# Patient Record
Sex: MALE | Race: WHITE | Employment: UNEMPLOYED | URBAN - METROPOLITAN AREA
[De-identification: names, ages, dates, MRNs, and addresses within clinical notes are randomized per-mention and may not be internally consistent; named-entity substitution may affect disease eponyms.]

---

## 2017-03-19 ENCOUNTER — HOSPITAL ENCOUNTER (EMERGENCY)
Age: 37
Discharge: HOME | End: 2017-03-19
Attending: EMERGENCY MEDICINE
Payer: MEDICAID

## 2017-03-19 VITALS
SYSTOLIC BLOOD PRESSURE: 141 MMHG | OXYGEN SATURATION: 98 % | HEART RATE: 92 BPM | RESPIRATION RATE: 16 BRPM | TEMPERATURE: 98.2 F | DIASTOLIC BLOOD PRESSURE: 92 MMHG

## 2017-03-19 DIAGNOSIS — I10 ESSENTIAL HYPERTENSION: ICD-10-CM

## 2017-03-19 DIAGNOSIS — M25.562 ACUTE PAIN OF LEFT KNEE: Primary | ICD-10-CM

## 2017-03-19 PROCEDURE — 99283 EMERGENCY DEPT VISIT LOW MDM: CPT

## 2017-03-19 ASSESSMENT — PAIN DESCRIPTION - ORIENTATION: ORIENTATION: RIGHT

## 2017-03-19 ASSESSMENT — PAIN DESCRIPTION - DESCRIPTORS: DESCRIPTORS: SHARP

## 2017-03-19 ASSESSMENT — PAIN SCALES - GENERAL: PAINLEVEL_OUTOF10: 6

## 2017-03-19 ASSESSMENT — PAIN DESCRIPTION - LOCATION: LOCATION: KNEE;LEG

## 2017-03-20 ASSESSMENT — ENCOUNTER SYMPTOMS
SORE THROAT: 0
SINUS PRESSURE: 0
RHINORRHEA: 0
ABDOMINAL PAIN: 0
SHORTNESS OF BREATH: 0
BLOOD IN STOOL: 0
VOMITING: 0
DIARRHEA: 0
NAUSEA: 0
EYE PAIN: 0
PHOTOPHOBIA: 0
COUGH: 0

## 2017-06-12 DIAGNOSIS — M25.561 RIGHT KNEE PAIN, UNSPECIFIED CHRONICITY: Primary | ICD-10-CM

## 2017-06-14 ENCOUNTER — OFFICE VISIT (OUTPATIENT)
Dept: ORTHOPEDIC SURGERY | Age: 37
End: 2017-06-14
Payer: MEDICAID

## 2017-06-14 VITALS — WEIGHT: 155 LBS | HEIGHT: 69 IN | BODY MASS INDEX: 22.96 KG/M2

## 2017-06-14 DIAGNOSIS — M25.562 CHRONIC PAIN OF LEFT KNEE: Primary | ICD-10-CM

## 2017-06-14 DIAGNOSIS — G89.29 CHRONIC PAIN OF LEFT KNEE: Primary | ICD-10-CM

## 2017-06-14 PROCEDURE — 99203 OFFICE O/P NEW LOW 30 MIN: CPT | Performed by: ORTHOPAEDIC SURGERY

## 2017-06-26 ENCOUNTER — HOSPITAL ENCOUNTER (OUTPATIENT)
Dept: MRI IMAGING | Age: 37
Discharge: HOME | End: 2017-06-26
Payer: MEDICAID

## 2017-06-26 DIAGNOSIS — M25.562 CHRONIC PAIN OF LEFT KNEE: ICD-10-CM

## 2017-06-26 DIAGNOSIS — G89.29 CHRONIC PAIN OF LEFT KNEE: ICD-10-CM

## 2017-06-26 PROCEDURE — 73721 MRI JNT OF LWR EXTRE W/O DYE: CPT

## 2017-07-12 ENCOUNTER — OFFICE VISIT (OUTPATIENT)
Dept: ORTHOPEDIC SURGERY | Age: 37
End: 2017-07-12
Payer: MEDICAID

## 2017-07-12 VITALS — WEIGHT: 155 LBS | BODY MASS INDEX: 22.96 KG/M2 | HEIGHT: 69 IN

## 2017-07-12 DIAGNOSIS — S83.512A SPRAIN OF ANTERIOR CRUCIATE LIGAMENT OF LEFT KNEE, INITIAL ENCOUNTER: ICD-10-CM

## 2017-07-12 DIAGNOSIS — S83.282A ACUTE TEAR LATERAL MENISCUS, LEFT, INITIAL ENCOUNTER: Primary | ICD-10-CM

## 2017-07-12 PROBLEM — S83.289A ACUTE TEAR LATERAL MENISCUS: Status: ACTIVE | Noted: 2017-07-12

## 2017-07-12 PROCEDURE — 99213 OFFICE O/P EST LOW 20 MIN: CPT | Performed by: ORTHOPAEDIC SURGERY

## 2017-07-12 ASSESSMENT — ENCOUNTER SYMPTOMS
COLOR CHANGE: 0
VOMITING: 0
ABDOMINAL DISTENTION: 0
EYE PAIN: 0
SHORTNESS OF BREATH: 0
COUGH: 0
ABDOMINAL PAIN: 0
NAUSEA: 0
WHEEZING: 0

## 2017-07-17 ENCOUNTER — TELEPHONE (OUTPATIENT)
Dept: ORTHOPEDIC SURGERY | Age: 37
End: 2017-07-17

## 2017-07-17 ENCOUNTER — HOSPITAL ENCOUNTER (OUTPATIENT)
Dept: PHYSICAL THERAPY | Facility: CLINIC | Age: 37
Setting detail: THERAPIES SERIES
Discharge: HOME | End: 2017-07-17
Payer: MEDICAID

## 2017-07-17 PROCEDURE — 97162 PT EVAL MOD COMPLEX 30 MIN: CPT

## 2017-07-17 PROCEDURE — 97016 VASOPNEUMATIC DEVICE THERAPY: CPT

## 2017-07-18 ENCOUNTER — APPOINTMENT (OUTPATIENT)
Dept: PHYSICAL THERAPY | Facility: CLINIC | Age: 37
End: 2017-07-18
Payer: MEDICAID

## 2017-07-21 ENCOUNTER — HOSPITAL ENCOUNTER (OUTPATIENT)
Dept: PHYSICAL THERAPY | Facility: CLINIC | Age: 37
Setting detail: THERAPIES SERIES
Discharge: HOME | End: 2017-07-21
Payer: MEDICAID

## 2017-07-21 PROCEDURE — 97016 VASOPNEUMATIC DEVICE THERAPY: CPT

## 2017-07-21 PROCEDURE — 97110 THERAPEUTIC EXERCISES: CPT

## 2017-07-25 ENCOUNTER — HOSPITAL ENCOUNTER (OUTPATIENT)
Dept: PHYSICAL THERAPY | Facility: CLINIC | Age: 37
Setting detail: THERAPIES SERIES
Discharge: HOME | End: 2017-07-25
Payer: MEDICAID

## 2017-07-25 NOTE — FLOWSHEET NOTE
[] Claretha Dubin        Outpatient Physical                Therapy       [address]       Phone: [phone]       Fax: [phone] [x] [facility] at [address] [facility]       Phone: [phone]       Fax: [phone] [] [place]. [address] [place] [address]     [facility]      Phone: [phone]      Fax:  [phone]     Physical Therapy Cancel/No Show note    Date: 2017  Patient: Cole Lugo  : 1980  MRN: [id number]    Cancels/No Shows to date:     For today's appointment patient:  [x]  Cancelled  []  Rescheduled appointment  []  No-show     Reason given by patient:  []  Patient ill  []  Conflicting appointment  []  No transportation    [x]  Conflict with work  []  No reason given  []  Weather related  []  Other:     Comments:  Pt called to cancel d/t having to work.  Pt has next appointment 17 at 11:45am.     Electronically signed by: Rafael Newton PTA

## 2017-07-28 ENCOUNTER — HOSPITAL ENCOUNTER (OUTPATIENT)
Dept: PHYSICAL THERAPY | Facility: CLINIC | Age: 37
Setting detail: THERAPIES SERIES
Discharge: HOME | End: 2017-07-28
Payer: MEDICAID

## 2017-08-01 ENCOUNTER — HOSPITAL ENCOUNTER (OUTPATIENT)
Dept: PHYSICAL THERAPY | Facility: CLINIC | Age: 37
Setting detail: THERAPIES SERIES
Discharge: HOME | End: 2017-08-01
Payer: MEDICAID

## 2017-08-04 ENCOUNTER — HOSPITAL ENCOUNTER (OUTPATIENT)
Dept: PHYSICAL THERAPY | Facility: CLINIC | Age: 37
Setting detail: THERAPIES SERIES
Discharge: HOME | End: 2017-08-04
Payer: MEDICAID

## 2017-08-18 ENCOUNTER — HOSPITAL ENCOUNTER (OUTPATIENT)
Dept: PHYSICAL THERAPY | Facility: CLINIC | Age: 37
Setting detail: THERAPIES SERIES
Discharge: HOME | End: 2017-08-18
Payer: MEDICAID

## 2017-08-29 ENCOUNTER — HOSPITAL ENCOUNTER (OUTPATIENT)
Dept: PHYSICAL THERAPY | Facility: CLINIC | Age: 37
Setting detail: THERAPIES SERIES
Discharge: HOME | End: 2017-08-29
Payer: MEDICAID

## 2017-12-10 ENCOUNTER — HOSPITAL ENCOUNTER (EMERGENCY)
Age: 37
Discharge: HOME | End: 2017-12-10
Attending: EMERGENCY MEDICINE

## 2017-12-10 VITALS
SYSTOLIC BLOOD PRESSURE: 145 MMHG | RESPIRATION RATE: 18 BRPM | OXYGEN SATURATION: 98 % | HEART RATE: 88 BPM | DIASTOLIC BLOOD PRESSURE: 77 MMHG | TEMPERATURE: 97.2 F

## 2017-12-10 DIAGNOSIS — S76.319A PARTIAL HAMSTRING TEAR, INITIAL ENCOUNTER: Primary | ICD-10-CM

## 2017-12-10 DIAGNOSIS — M25.562 CHRONIC PAIN OF LEFT KNEE: ICD-10-CM

## 2017-12-10 DIAGNOSIS — G89.29 CHRONIC PAIN OF LEFT KNEE: ICD-10-CM

## 2017-12-10 PROCEDURE — 6370000000 HC RX 637 (ALT 250 FOR IP): Performed by: STUDENT IN AN ORGANIZED HEALTH CARE EDUCATION/TRAINING PROGRAM

## 2017-12-10 PROCEDURE — G0382 LEV 3 HOSP TYPE B ED VISIT: HCPCS

## 2017-12-10 RX ORDER — IBUPROFEN 800 MG/1
800 TABLET ORAL ONCE
Status: COMPLETED | OUTPATIENT
Start: 2017-12-10 | End: 2017-12-10

## 2017-12-10 RX ADMIN — IBUPROFEN 800 MG: 800 TABLET, FILM COATED ORAL at 21:29

## 2017-12-10 ASSESSMENT — PAIN SCALES - GENERAL
PAINLEVEL_OUTOF10: 7
PAINLEVEL_OUTOF10: 7

## 2017-12-10 ASSESSMENT — PAIN DESCRIPTION - PAIN TYPE: TYPE: ACUTE PAIN

## 2017-12-10 ASSESSMENT — ENCOUNTER SYMPTOMS
NAUSEA: 0
SHORTNESS OF BREATH: 0
VOMITING: 0
ABDOMINAL PAIN: 0

## 2017-12-10 ASSESSMENT — PAIN DESCRIPTION - LOCATION: LOCATION: KNEE
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
## 2017-12-11 NOTE — ED NOTES
Pt arrives to the ED by self  Pt was performing a stropping routine whne he landed in the splits  Pt having left knee pain  Pt rates pain a 7/10 at this time     Lexi Delgado RN  12/10/17 2567

## 2018-07-05 ENCOUNTER — HOSPITAL ENCOUNTER (EMERGENCY)
Age: 38
Discharge: HOME | End: 2018-07-05
Attending: EMERGENCY MEDICINE
Payer: OTHER MISCELLANEOUS

## 2018-07-05 ENCOUNTER — APPOINTMENT (OUTPATIENT)
Dept: GENERAL RADIOLOGY | Age: 38
End: 2018-07-05
Payer: OTHER MISCELLANEOUS

## 2018-07-05 VITALS
OXYGEN SATURATION: 97 % | BODY MASS INDEX: 21.48 KG/M2 | HEIGHT: 69 IN | DIASTOLIC BLOOD PRESSURE: 89 MMHG | RESPIRATION RATE: 16 BRPM | WEIGHT: 145 LBS | HEART RATE: 92 BPM | TEMPERATURE: 97.3 F | SYSTOLIC BLOOD PRESSURE: 136 MMHG

## 2018-07-05 DIAGNOSIS — S16.1XXA STRAIN OF NECK MUSCLE, INITIAL ENCOUNTER: Primary | ICD-10-CM

## 2018-07-05 PROCEDURE — 99283 EMERGENCY DEPT VISIT LOW MDM: CPT

## 2018-07-05 PROCEDURE — 6360000002 HC RX W HCPCS: Performed by: EMERGENCY MEDICINE

## 2018-07-05 PROCEDURE — 72040 X-RAY EXAM NECK SPINE 2-3 VW: CPT

## 2018-07-05 PROCEDURE — 96372 THER/PROPH/DIAG INJ SC/IM: CPT

## 2018-07-05 RX ORDER — IBUPROFEN 400 MG/1
400 TABLET ORAL EVERY 6 HOURS PRN
Qty: 60 TABLET | Refills: 0 | Status: SHIPPED | OUTPATIENT
Start: 2018-07-05 | End: 2020-10-21

## 2018-07-05 RX ORDER — CYCLOBENZAPRINE HCL 10 MG
10 TABLET ORAL 3 TIMES DAILY PRN
Qty: 30 TABLET | Refills: 0 | Status: SHIPPED | OUTPATIENT
Start: 2018-07-05 | End: 2018-07-15

## 2018-07-05 RX ORDER — ORPHENADRINE CITRATE 30 MG/ML
60 INJECTION INTRAMUSCULAR; INTRAVENOUS ONCE
Status: COMPLETED | OUTPATIENT
Start: 2018-07-05 | End: 2018-07-05

## 2018-07-05 RX ADMIN — ORPHENADRINE CITRATE 60 MG: 30 INJECTION INTRAMUSCULAR; INTRAVENOUS at 07:32

## 2018-07-05 ASSESSMENT — ENCOUNTER SYMPTOMS
NAUSEA: 0
SHORTNESS OF BREATH: 0
ABDOMINAL PAIN: 0
VOMITING: 0

## 2018-07-05 ASSESSMENT — PAIN SCALES - GENERAL: PAINLEVEL_OUTOF10: 7

## 2018-07-05 NOTE — ED PROVIDER NOTES
cyclobenzaprine (FLEXERIL) 10 MG tablet Take 1 tablet by mouth 3 times daily as needed for Muscle spasms, Disp-30 tablet, R-0Print      ibuprofen (IBU) 400 MG tablet Take 1 tablet by mouth every 6 hours as needed for Pain, Disp-60 tablet, R-0Print             Prabha Trent MD  Emergency Medicine Resident    (Please note that portions of this note were completed with a voice recognition program.  Efforts were made to edit the dictations but occasionally words are mis-transcribed.)       Prabha Trent MD  Resident  07/05/18 7328

## 2018-07-05 NOTE — ED NOTES
Received into room 2 via triage with c/o neck pain and other places.   PAin at and 3 at rest and 7 with movement     Ivan Coates RN  07/05/18 5274
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
## 2018-10-18 ENCOUNTER — HOSPITAL ENCOUNTER (EMERGENCY)
Age: 38
Discharge: HOME | End: 2018-10-18
Attending: EMERGENCY MEDICINE

## 2018-10-18 VITALS
HEART RATE: 88 BPM | WEIGHT: 148 LBS | OXYGEN SATURATION: 98 % | SYSTOLIC BLOOD PRESSURE: 140 MMHG | HEIGHT: 69 IN | TEMPERATURE: 97.5 F | RESPIRATION RATE: 16 BRPM | BODY MASS INDEX: 21.92 KG/M2 | DIASTOLIC BLOOD PRESSURE: 94 MMHG

## 2018-10-18 DIAGNOSIS — K08.89 PAIN, DENTAL: Primary | ICD-10-CM

## 2018-10-18 PROCEDURE — 99282 EMERGENCY DEPT VISIT SF MDM: CPT

## 2018-10-18 PROCEDURE — 6360000002 HC RX W HCPCS: Performed by: STUDENT IN AN ORGANIZED HEALTH CARE EDUCATION/TRAINING PROGRAM

## 2018-10-18 PROCEDURE — 96374 THER/PROPH/DIAG INJ IV PUSH: CPT

## 2018-10-18 PROCEDURE — 6370000000 HC RX 637 (ALT 250 FOR IP): Performed by: STUDENT IN AN ORGANIZED HEALTH CARE EDUCATION/TRAINING PROGRAM

## 2018-10-18 RX ORDER — CLINDAMYCIN HYDROCHLORIDE 300 MG/1
300 CAPSULE ORAL 4 TIMES DAILY
Status: ON HOLD | COMMUNITY
End: 2019-09-05 | Stop reason: ALTCHOICE

## 2018-10-18 RX ORDER — HYDROCODONE BITARTRATE AND ACETAMINOPHEN 5; 325 MG/1; MG/1
2 TABLET ORAL ONCE
Status: COMPLETED | OUTPATIENT
Start: 2018-10-18 | End: 2018-10-18

## 2018-10-18 RX ORDER — HYDROCODONE BITARTRATE AND ACETAMINOPHEN 5; 325 MG/1; MG/1
1 TABLET ORAL EVERY 8 HOURS PRN
Qty: 15 TABLET | Refills: 0 | Status: SHIPPED | OUTPATIENT
Start: 2018-10-18 | End: 2018-10-23

## 2018-10-18 RX ORDER — KETOROLAC TROMETHAMINE 30 MG/ML
30 INJECTION, SOLUTION INTRAMUSCULAR; INTRAVENOUS ONCE
Status: COMPLETED | OUTPATIENT
Start: 2018-10-18 | End: 2018-10-18

## 2018-10-18 RX ADMIN — KETOROLAC TROMETHAMINE 30 MG: 30 INJECTION, SOLUTION INTRAMUSCULAR at 18:15

## 2018-10-18 RX ADMIN — HYDROCODONE BITARTRATE AND ACETAMINOPHEN 2 TABLET: 5; 325 TABLET ORAL at 18:14

## 2018-10-18 ASSESSMENT — PAIN DESCRIPTION - PAIN TYPE: TYPE: ACUTE PAIN

## 2018-10-18 ASSESSMENT — PAIN DESCRIPTION - ORIENTATION: ORIENTATION: RIGHT;UPPER

## 2018-10-18 ASSESSMENT — ENCOUNTER SYMPTOMS
COUGH: 0
TROUBLE SWALLOWING: 0
SHORTNESS OF BREATH: 0
EYE PAIN: 0
SORE THROAT: 0
VOMITING: 0
EYE DISCHARGE: 0
DIARRHEA: 1
ABDOMINAL PAIN: 1
EYE ITCHING: 0
NAUSEA: 0
RHINORRHEA: 0
BACK PAIN: 0

## 2018-10-18 ASSESSMENT — PAIN SCALES - GENERAL
PAINLEVEL_OUTOF10: 4
PAINLEVEL_OUTOF10: 5

## 2018-10-18 ASSESSMENT — PAIN DESCRIPTION - FREQUENCY: FREQUENCY: CONTINUOUS

## 2018-10-18 ASSESSMENT — PAIN DESCRIPTION - LOCATION: LOCATION: TEETH

## 2018-10-18 ASSESSMENT — PAIN DESCRIPTION - DESCRIPTORS: DESCRIPTORS: ACHING;CONSTANT

## 2018-10-18 ASSESSMENT — PAIN DESCRIPTION - ONSET: ONSET: ON-GOING

## 2019-09-05 ENCOUNTER — HOSPITAL ENCOUNTER (INPATIENT)
Age: 39
LOS: 5 days | Discharge: HOME | DRG: 139 | End: 2019-09-10
Attending: EMERGENCY MEDICINE | Admitting: INTERNAL MEDICINE
Payer: MEDICAID

## 2019-09-05 ENCOUNTER — APPOINTMENT (OUTPATIENT)
Dept: GENERAL RADIOLOGY | Age: 39
DRG: 139 | End: 2019-09-05
Payer: MEDICAID

## 2019-09-05 ENCOUNTER — APPOINTMENT (OUTPATIENT)
Dept: CT IMAGING | Age: 39
DRG: 139 | End: 2019-09-05
Payer: MEDICAID

## 2019-09-05 DIAGNOSIS — N17.9 ACUTE KIDNEY INJURY (HCC): Primary | ICD-10-CM

## 2019-09-05 DIAGNOSIS — H91.93 HEARING DIFFICULTY OF BOTH EARS: ICD-10-CM

## 2019-09-05 DIAGNOSIS — E87.20 LACTIC ACIDOSIS: ICD-10-CM

## 2019-09-05 DIAGNOSIS — J18.9 PNEUMONIA DUE TO ORGANISM: ICD-10-CM

## 2019-09-05 LAB
-: ABNORMAL
ABSOLUTE EOS #: 0 K/UL (ref 0–0.4)
ABSOLUTE IMMATURE GRANULOCYTE: 0.13 K/UL (ref 0–0.3)
ABSOLUTE LYMPH #: 1.79 K/UL (ref 1–4.8)
ABSOLUTE MONO #: 0.64 K/UL (ref 0.1–0.8)
ACETAMINOPHEN LEVEL: <5 UG/ML (ref 10–30)
ALBUMIN SERPL-MCNC: 3.9 G/DL (ref 3.5–5.2)
ALBUMIN/GLOBULIN RATIO: 1.1 (ref 1–2.5)
ALP BLD-CCNC: 49 U/L (ref 40–129)
ALT SERPL-CCNC: 34 U/L (ref 5–41)
AMORPHOUS: ABNORMAL
AMPHETAMINE SCREEN URINE: POSITIVE
ANION GAP SERPL CALCULATED.3IONS-SCNC: 21 MMOL/L (ref 9–17)
AST SERPL-CCNC: 54 U/L
BACTERIA: ABNORMAL
BARBITURATE SCREEN URINE: NEGATIVE
BASOPHILS # BLD: 0 % (ref 0–2)
BASOPHILS ABSOLUTE: 0 K/UL (ref 0–0.2)
BENZODIAZEPINE SCREEN, URINE: NEGATIVE
BILIRUB SERPL-MCNC: 0.22 MG/DL (ref 0.3–1.2)
BILIRUBIN DIRECT: <0.08 MG/DL
BILIRUBIN URINE: NEGATIVE
BILIRUBIN, INDIRECT: ABNORMAL MG/DL (ref 0–1)
BNP INTERPRETATION: ABNORMAL
BUN BLDV-MCNC: 32 MG/DL (ref 6–20)
BUN/CREAT BLD: ABNORMAL (ref 9–20)
BUPRENORPHINE URINE: ABNORMAL
C-REACTIVE PROTEIN: 166.1 MG/L (ref 0–5)
CALCIUM SERPL-MCNC: 8.1 MG/DL (ref 8.6–10.4)
CANNABINOID SCREEN URINE: NEGATIVE
CASTS UA: ABNORMAL /LPF (ref 0–2)
CHLORIDE BLD-SCNC: 91 MMOL/L (ref 98–107)
CO2: 17 MMOL/L (ref 20–31)
COCAINE METABOLITE, URINE: NEGATIVE
COLOR: YELLOW
CORTISOL COLLECTION INFO: ABNORMAL
CORTISOL: 34.5 UG/DL (ref 2.7–18.4)
CREAT SERPL-MCNC: 4.07 MG/DL (ref 0.7–1.2)
CRYSTALS, UA: ABNORMAL /HPF
CRYSTALS, UA: ABNORMAL /HPF
DIFFERENTIAL TYPE: ABNORMAL
DIRECT EXAM: NORMAL
EOSINOPHILS RELATIVE PERCENT: 0 % (ref 1–4)
EPITHELIAL CELLS UA: ABNORMAL /HPF (ref 0–5)
ETHANOL PERCENT: <0.01 %
ETHANOL: <10 MG/DL
GFR AFRICAN AMERICAN: 20 ML/MIN
GFR NON-AFRICAN AMERICAN: 17 ML/MIN
GFR SERPL CREATININE-BSD FRML MDRD: ABNORMAL ML/MIN/{1.73_M2}
GFR SERPL CREATININE-BSD FRML MDRD: ABNORMAL ML/MIN/{1.73_M2}
GLOBULIN: ABNORMAL G/DL (ref 1.5–3.8)
GLUCOSE BLD-MCNC: 95 MG/DL (ref 70–99)
GLUCOSE URINE: NEGATIVE
HAV IGM SER IA-ACNC: NONREACTIVE
HCT VFR BLD CALC: 43.1 % (ref 40.7–50.3)
HEMOGLOBIN: 13.4 G/DL (ref 13–17)
HEPATITIS B CORE IGM ANTIBODY: NONREACTIVE
HEPATITIS B SURFACE ANTIGEN: NONREACTIVE
HEPATITIS C ANTIBODY: NONREACTIVE
HIV AG/AB: NONREACTIVE
IMMATURE GRANULOCYTES: 1 %
INR BLD: 0.9
KETONES, URINE: NEGATIVE
LACTIC ACID, WHOLE BLOOD: 3.8 MMOL/L (ref 0.7–2.1)
LACTIC ACID, WHOLE BLOOD: 6.7 MMOL/L (ref 0.7–2.1)
LEUKOCYTE ESTERASE, URINE: NEGATIVE
LYMPHOCYTES # BLD: 14 % (ref 24–44)
Lab: NORMAL
MCH RBC QN AUTO: 30.4 PG (ref 25.2–33.5)
MCHC RBC AUTO-ENTMCNC: 31.1 G/DL (ref 28.4–34.8)
MCV RBC AUTO: 97.7 FL (ref 82.6–102.9)
MDMA URINE: ABNORMAL
METHADONE SCREEN, URINE: NEGATIVE
METHAMPHETAMINE, URINE: ABNORMAL
MONOCYTES # BLD: 5 % (ref 1–7)
MORPHOLOGY: ABNORMAL
MUCUS: ABNORMAL
NITRITE, URINE: NEGATIVE
NRBC AUTOMATED: 0 PER 100 WBC
OPIATES, URINE: NEGATIVE
OTHER OBSERVATIONS UA: ABNORMAL
OXYCODONE SCREEN URINE: NEGATIVE
PARTIAL THROMBOPLASTIN TIME: 25.6 SEC (ref 20.5–30.5)
PDW BLD-RTO: 14.4 % (ref 11.8–14.4)
PH UA: 5 (ref 5–8)
PHENCYCLIDINE, URINE: NEGATIVE
PLATELET # BLD: 280 K/UL (ref 138–453)
PLATELET ESTIMATE: ABNORMAL
PMV BLD AUTO: 9.8 FL (ref 8.1–13.5)
POTASSIUM SERPL-SCNC: 5 MMOL/L (ref 3.7–5.3)
PRO-BNP: 3554 PG/ML
PROCALCITONIN: 89.86 NG/ML
PROPOXYPHENE, URINE: ABNORMAL
PROTEIN UA: ABNORMAL
PROTHROMBIN TIME: 9.8 SEC (ref 9–12)
RBC # BLD: 4.41 M/UL (ref 4.21–5.77)
RBC # BLD: ABNORMAL 10*6/UL
RBC UA: ABNORMAL /HPF (ref 0–2)
RENAL EPITHELIAL, UA: ABNORMAL /HPF
SALICYLATE LEVEL: <1 MG/DL (ref 3–10)
SEDIMENTATION RATE, ERYTHROCYTE: 5 MM (ref 0–10)
SEG NEUTROPHILS: 80 % (ref 36–66)
SEGMENTED NEUTROPHILS ABSOLUTE COUNT: 10.24 K/UL (ref 1.8–7.7)
SODIUM BLD-SCNC: 129 MMOL/L (ref 135–144)
SPECIFIC GRAVITY UA: 1.02 (ref 1–1.03)
SPECIMEN DESCRIPTION: NORMAL
TEST INFORMATION: ABNORMAL
TOTAL CK: 1023 U/L (ref 39–308)
TOTAL PROTEIN: 7.3 G/DL (ref 6.4–8.3)
TOXIC TRICYCLIC SC,BLOOD: NEGATIVE
TRICHOMONAS: ABNORMAL
TRICYCLIC ANTIDEPRESSANTS, UR: ABNORMAL
TROPONIN INTERP: ABNORMAL
TROPONIN INTERP: ABNORMAL
TROPONIN T: ABNORMAL NG/ML
TROPONIN T: ABNORMAL NG/ML
TROPONIN, HIGH SENSITIVITY: 26 NG/L (ref 0–22)
TROPONIN, HIGH SENSITIVITY: 52 NG/L (ref 0–22)
TURBIDITY: ABNORMAL
URINE HGB: ABNORMAL
UROBILINOGEN, URINE: NORMAL
WBC # BLD: 12.8 K/UL (ref 3.5–11.3)
WBC # BLD: ABNORMAL 10*3/UL
WBC UA: ABNORMAL /HPF (ref 0–5)
YEAST: ABNORMAL

## 2019-09-05 PROCEDURE — 2000000000 HC ICU R&B

## 2019-09-05 PROCEDURE — 80307 DRUG TEST PRSMV CHEM ANLYZR: CPT

## 2019-09-05 PROCEDURE — 2580000003 HC RX 258: Performed by: STUDENT IN AN ORGANIZED HEALTH CARE EDUCATION/TRAINING PROGRAM

## 2019-09-05 PROCEDURE — 85025 COMPLETE CBC W/AUTO DIFF WBC: CPT

## 2019-09-05 PROCEDURE — 85610 PROTHROMBIN TIME: CPT

## 2019-09-05 PROCEDURE — 85730 THROMBOPLASTIN TIME PARTIAL: CPT

## 2019-09-05 PROCEDURE — 80048 BASIC METABOLIC PNL TOTAL CA: CPT

## 2019-09-05 PROCEDURE — 87449 NOS EACH ORGANISM AG IA: CPT

## 2019-09-05 PROCEDURE — 87086 URINE CULTURE/COLONY COUNT: CPT

## 2019-09-05 PROCEDURE — 82533 TOTAL CORTISOL: CPT

## 2019-09-05 PROCEDURE — 93005 ELECTROCARDIOGRAM TRACING: CPT | Performed by: STUDENT IN AN ORGANIZED HEALTH CARE EDUCATION/TRAINING PROGRAM

## 2019-09-05 PROCEDURE — 96367 TX/PROPH/DG ADDL SEQ IV INF: CPT

## 2019-09-05 PROCEDURE — 71046 X-RAY EXAM CHEST 2 VIEWS: CPT

## 2019-09-05 PROCEDURE — 71250 CT THORAX DX C-: CPT

## 2019-09-05 PROCEDURE — 96375 TX/PRO/DX INJ NEW DRUG ADDON: CPT

## 2019-09-05 PROCEDURE — 87389 HIV-1 AG W/HIV-1&-2 AB AG IA: CPT

## 2019-09-05 PROCEDURE — 84484 ASSAY OF TROPONIN QUANT: CPT

## 2019-09-05 PROCEDURE — 87641 MR-STAPH DNA AMP PROBE: CPT

## 2019-09-05 PROCEDURE — 81001 URINALYSIS AUTO W/SCOPE: CPT

## 2019-09-05 PROCEDURE — 83605 ASSAY OF LACTIC ACID: CPT

## 2019-09-05 PROCEDURE — 99285 EMERGENCY DEPT VISIT HI MDM: CPT

## 2019-09-05 PROCEDURE — 2500000003 HC RX 250 WO HCPCS: Performed by: STUDENT IN AN ORGANIZED HEALTH CARE EDUCATION/TRAINING PROGRAM

## 2019-09-05 PROCEDURE — 86738 MYCOPLASMA ANTIBODY: CPT

## 2019-09-05 PROCEDURE — G0480 DRUG TEST DEF 1-7 CLASSES: HCPCS

## 2019-09-05 PROCEDURE — 96365 THER/PROPH/DIAG IV INF INIT: CPT

## 2019-09-05 PROCEDURE — 87040 BLOOD CULTURE FOR BACTERIA: CPT

## 2019-09-05 PROCEDURE — 82550 ASSAY OF CK (CPK): CPT

## 2019-09-05 PROCEDURE — 83880 ASSAY OF NATRIURETIC PEPTIDE: CPT

## 2019-09-05 PROCEDURE — 80074 ACUTE HEPATITIS PANEL: CPT

## 2019-09-05 PROCEDURE — 80076 HEPATIC FUNCTION PANEL: CPT

## 2019-09-05 PROCEDURE — 6360000002 HC RX W HCPCS: Performed by: STUDENT IN AN ORGANIZED HEALTH CARE EDUCATION/TRAINING PROGRAM

## 2019-09-05 PROCEDURE — 6370000000 HC RX 637 (ALT 250 FOR IP): Performed by: STUDENT IN AN ORGANIZED HEALTH CARE EDUCATION/TRAINING PROGRAM

## 2019-09-05 PROCEDURE — 86140 C-REACTIVE PROTEIN: CPT

## 2019-09-05 PROCEDURE — 85651 RBC SED RATE NONAUTOMATED: CPT

## 2019-09-05 PROCEDURE — 70450 CT HEAD/BRAIN W/O DYE: CPT

## 2019-09-05 PROCEDURE — 84145 PROCALCITONIN (PCT): CPT

## 2019-09-05 RX ORDER — ACETAMINOPHEN 325 MG/1
650 TABLET ORAL EVERY 4 HOURS PRN
Status: DISCONTINUED | OUTPATIENT
Start: 2019-09-05 | End: 2019-09-06 | Stop reason: SDUPTHER

## 2019-09-05 RX ORDER — SODIUM CHLORIDE 0.9 % (FLUSH) 0.9 %
10 SYRINGE (ML) INJECTION EVERY 12 HOURS SCHEDULED
Status: DISCONTINUED | OUTPATIENT
Start: 2019-09-05 | End: 2019-09-10 | Stop reason: HOSPADM

## 2019-09-05 RX ORDER — OXYCODONE HYDROCHLORIDE 5 MG/1
5 TABLET ORAL ONCE
Status: COMPLETED | OUTPATIENT
Start: 2019-09-05 | End: 2019-09-05

## 2019-09-05 RX ORDER — 0.9 % SODIUM CHLORIDE 0.9 %
1000 INTRAVENOUS SOLUTION INTRAVENOUS ONCE
Status: COMPLETED | OUTPATIENT
Start: 2019-09-05 | End: 2019-09-05

## 2019-09-05 RX ORDER — SODIUM CHLORIDE 0.9 % (FLUSH) 0.9 %
10 SYRINGE (ML) INJECTION PRN
Status: DISCONTINUED | OUTPATIENT
Start: 2019-09-05 | End: 2019-09-10 | Stop reason: HOSPADM

## 2019-09-05 RX ORDER — 0.9 % SODIUM CHLORIDE 0.9 %
109 INTRAVENOUS SOLUTION INTRAVENOUS ONCE
Status: COMPLETED | OUTPATIENT
Start: 2019-09-05 | End: 2019-09-05

## 2019-09-05 RX ORDER — SODIUM CHLORIDE 9 MG/ML
INJECTION, SOLUTION INTRAVENOUS CONTINUOUS
Status: DISCONTINUED | OUTPATIENT
Start: 2019-09-05 | End: 2019-09-08

## 2019-09-05 RX ORDER — FENTANYL CITRATE 50 UG/ML
50 INJECTION, SOLUTION INTRAMUSCULAR; INTRAVENOUS ONCE
Status: COMPLETED | OUTPATIENT
Start: 2019-09-05 | End: 2019-09-05

## 2019-09-05 RX ORDER — LORAZEPAM 2 MG/ML
1 INJECTION INTRAMUSCULAR ONCE
Status: COMPLETED | OUTPATIENT
Start: 2019-09-05 | End: 2019-09-05

## 2019-09-05 RX ADMIN — Medication: at 17:25

## 2019-09-05 RX ADMIN — Medication 10 ML: at 21:11

## 2019-09-05 RX ADMIN — SODIUM CHLORIDE 109 ML: 9 INJECTION, SOLUTION INTRAVENOUS at 17:34

## 2019-09-05 RX ADMIN — SODIUM CHLORIDE 1000 ML: 9 INJECTION, SOLUTION INTRAVENOUS at 15:22

## 2019-09-05 RX ADMIN — VANCOMYCIN HYDROCHLORIDE 1250 MG: 10 INJECTION, POWDER, LYOPHILIZED, FOR SOLUTION INTRAVENOUS at 22:25

## 2019-09-05 RX ADMIN — SODIUM CHLORIDE: 9 INJECTION, SOLUTION INTRAVENOUS at 21:21

## 2019-09-05 RX ADMIN — OXYCODONE HYDROCHLORIDE 5 MG: 5 TABLET ORAL at 21:19

## 2019-09-05 RX ADMIN — SODIUM CHLORIDE 1000 ML: 9 INJECTION, SOLUTION INTRAVENOUS at 13:02

## 2019-09-05 RX ADMIN — CEFTRIAXONE SODIUM 1 G: 1 INJECTION, POWDER, FOR SOLUTION INTRAMUSCULAR; INTRAVENOUS at 15:22

## 2019-09-05 RX ADMIN — LORAZEPAM 1 MG: 2 INJECTION INTRAMUSCULAR; INTRAVENOUS at 13:34

## 2019-09-05 RX ADMIN — PIPERACILLIN AND TAZOBACTAM 3.38 G: 3; .375 INJECTION, POWDER, FOR SOLUTION INTRAVENOUS at 22:26

## 2019-09-05 RX ADMIN — FENTANYL CITRATE 50 MCG: 50 INJECTION INTRAMUSCULAR; INTRAVENOUS at 21:19

## 2019-09-05 RX ADMIN — AZITHROMYCIN MONOHYDRATE 500 MG: 500 INJECTION, POWDER, LYOPHILIZED, FOR SOLUTION INTRAVENOUS at 15:54

## 2019-09-05 ASSESSMENT — PAIN DESCRIPTION - LOCATION
LOCATION: CHEST;HEAD
LOCATION: CHEST

## 2019-09-05 ASSESSMENT — ENCOUNTER SYMPTOMS
SHORTNESS OF BREATH: 0
SORE THROAT: 1
ABDOMINAL PAIN: 0
BACK PAIN: 0
NAUSEA: 0
VOMITING: 0
COUGH: 1
CONSTIPATION: 0
DIARRHEA: 0

## 2019-09-05 ASSESSMENT — PAIN SCALES - GENERAL
PAINLEVEL_OUTOF10: 7
PAINLEVEL_OUTOF10: 6

## 2019-09-05 ASSESSMENT — PAIN DESCRIPTION - ORIENTATION: ORIENTATION: RIGHT

## 2019-09-05 ASSESSMENT — PAIN DESCRIPTION - FREQUENCY
FREQUENCY: CONTINUOUS
FREQUENCY: CONTINUOUS

## 2019-09-05 ASSESSMENT — PAIN DESCRIPTION - PROGRESSION
CLINICAL_PROGRESSION: NOT CHANGED
CLINICAL_PROGRESSION: NOT CHANGED

## 2019-09-05 ASSESSMENT — PAIN DESCRIPTION - PAIN TYPE: TYPE: ACUTE PAIN

## 2019-09-05 ASSESSMENT — PAIN DESCRIPTION - ONSET
ONSET: ON-GOING
ONSET: ON-GOING

## 2019-09-06 ENCOUNTER — APPOINTMENT (OUTPATIENT)
Dept: INTERVENTIONAL RADIOLOGY/VASCULAR | Age: 39
DRG: 139 | End: 2019-09-06
Payer: MEDICAID

## 2019-09-06 ENCOUNTER — APPOINTMENT (OUTPATIENT)
Dept: GENERAL RADIOLOGY | Age: 39
DRG: 139 | End: 2019-09-06
Payer: MEDICAID

## 2019-09-06 ENCOUNTER — APPOINTMENT (OUTPATIENT)
Dept: ULTRASOUND IMAGING | Age: 39
DRG: 139 | End: 2019-09-06
Payer: MEDICAID

## 2019-09-06 PROBLEM — G93.41 METABOLIC ENCEPHALOPATHY: Status: ACTIVE | Noted: 2019-09-06

## 2019-09-06 LAB
ABSOLUTE EOS #: 0 K/UL (ref 0–0.4)
ABSOLUTE IMMATURE GRANULOCYTE: 1.05 K/UL (ref 0–0.3)
ABSOLUTE LYMPH #: 1.31 K/UL (ref 1–4.8)
ABSOLUTE MONO #: 0.26 K/UL (ref 0.1–0.8)
ALBUMIN SERPL-MCNC: 3.4 G/DL (ref 3.5–5.2)
ALBUMIN/GLOBULIN RATIO: 1.1 (ref 1–2.5)
ALP BLD-CCNC: 46 U/L (ref 40–129)
ALT SERPL-CCNC: 31 U/L (ref 5–41)
ANION GAP SERPL CALCULATED.3IONS-SCNC: 14 MMOL/L (ref 9–17)
APPEARANCE CSF: CLEAR
AST SERPL-CCNC: 42 U/L
BASOPHILS # BLD: 0 % (ref 0–2)
BASOPHILS ABSOLUTE: 0 K/UL (ref 0–0.2)
BILIRUB SERPL-MCNC: 0.29 MG/DL (ref 0.3–1.2)
BUN BLDV-MCNC: 25 MG/DL (ref 6–20)
BUN/CREAT BLD: ABNORMAL (ref 9–20)
CALCIUM IONIZED: 0.99 MMOL/L (ref 1.13–1.33)
CALCIUM SERPL-MCNC: 8 MG/DL (ref 8.6–10.4)
CHLORIDE BLD-SCNC: 94 MMOL/L (ref 98–107)
CO2: 26 MMOL/L (ref 20–31)
COMPLEMENT C3: 103 MG/DL (ref 90–180)
COMPLEMENT C4: 23 MG/DL (ref 10–40)
CREAT SERPL-MCNC: 1.46 MG/DL (ref 0.7–1.2)
CREATININE URINE: 53 MG/DL (ref 39–259)
CRYPTOCOCCUS NEOFORMANS/GATTI CSF FILM ARR.: NOT DETECTED
CULTURE: NORMAL
CYTOMEGALOVIRUS (CMV) CSF FILM ARRAY: NOT DETECTED
DIFFERENTIAL TYPE: ABNORMAL
DIRECT EXAM: NORMAL
EKG ATRIAL RATE: 83 BPM
EKG P AXIS: 62 DEGREES
EKG P-R INTERVAL: 138 MS
EKG Q-T INTERVAL: 382 MS
EKG QRS DURATION: 88 MS
EKG QTC CALCULATION (BAZETT): 448 MS
EKG R AXIS: 68 DEGREES
EKG T AXIS: 62 DEGREES
EKG VENTRICULAR RATE: 83 BPM
ENTEROVIRUS CSF FILM ARRAY: NOT DETECTED
EOSINOPHILS RELATIVE PERCENT: 0 % (ref 1–4)
ESCHERICHIA COLI K1 CSF FILM ARRAY: NOT DETECTED
GFR AFRICAN AMERICAN: >60 ML/MIN
GFR NON-AFRICAN AMERICAN: 54 ML/MIN
GFR SERPL CREATININE-BSD FRML MDRD: ABNORMAL ML/MIN/{1.73_M2}
GFR SERPL CREATININE-BSD FRML MDRD: ABNORMAL ML/MIN/{1.73_M2}
GLUCOSE BLD-MCNC: 104 MG/DL (ref 70–99)
GLUCOSE, CSF: 77 MG/DL (ref 40–70)
HAEMOPHILUS INFLUENZA CSF FILM ARRAY: NOT DETECTED
HCT VFR BLD CALC: 37.8 % (ref 40.7–50.3)
HEMOGLOBIN: 12.6 G/DL (ref 13–17)
HHV-6 (HERPESVIRUS 6) CSF FILM ARRAY: NOT DETECTED
HSV-1 CSF FILM ARRAY: NOT DETECTED
HSV-2 CSF FILM ARRAY: NOT DETECTED
IMMATURE GRANULOCYTES: 4 %
INR BLD: 1.1
LACTIC ACID, SEPSIS WHOLE BLOOD: 3.1 MMOL/L (ref 0.5–1.9)
LACTIC ACID, SEPSIS: ABNORMAL MMOL/L (ref 0.5–1.9)
LISTERIA MONOCYTOGENES CSF FILM ARRAY: NOT DETECTED
LV EF: 55 %
LVEF MODALITY: NORMAL
LYMPHOCYTES # BLD: 5 % (ref 24–44)
Lab: NORMAL
Lab: NORMAL
MAGNESIUM: 1.7 MG/DL (ref 1.6–2.6)
MCH RBC QN AUTO: 30.8 PG (ref 25.2–33.5)
MCHC RBC AUTO-ENTMCNC: 33.3 G/DL (ref 28.4–34.8)
MCV RBC AUTO: 92.4 FL (ref 82.6–102.9)
MONOCYTES # BLD: 1 % (ref 1–7)
MORPHOLOGY: ABNORMAL
MRSA, DNA, NASAL: NORMAL
MYCOPLASMA PNEUMONIAE IGM: 1.24
NEISSERIA MENIGITIDIS CSF FILM ARRAY: NOT DETECTED
NRBC AUTOMATED: 0 PER 100 WBC
PARECHOVIRUS CSF FILM ARRAY: NOT DETECTED
PARTIAL THROMBOPLASTIN TIME: 28.8 SEC (ref 20.5–30.5)
PDW BLD-RTO: 14 % (ref 11.8–14.4)
PHOSPHORUS: 2.8 MG/DL (ref 2.5–4.5)
PLATELET # BLD: 248 K/UL (ref 138–453)
PLATELET ESTIMATE: ABNORMAL
PMV BLD AUTO: 9.7 FL (ref 8.1–13.5)
POTASSIUM SERPL-SCNC: 4.8 MMOL/L (ref 3.7–5.3)
PROCALCITONIN: 68.6 NG/ML
PROTEIN CSF: 23.8 MG/DL (ref 15–45)
PROTHROMBIN TIME: 11.9 SEC (ref 9–12)
RBC # BLD: 4.09 M/UL (ref 4.21–5.77)
RBC # BLD: ABNORMAL 10*6/UL
RBC CSF: 0 /MM3
SEG NEUTROPHILS: 90 % (ref 36–66)
SEGMENTED NEUTROPHILS ABSOLUTE COUNT: 23.58 K/UL (ref 1.8–7.7)
SODIUM BLD-SCNC: 134 MMOL/L (ref 135–144)
SPECIMEN DESCRIPTION: NORMAL
STREPTOCOCCUS AGALACTIAE CSF FILM ARRAY: NOT DETECTED
STREPTOCOCCUS PNEUMONIAE CSF FILM ARRAY: NOT DETECTED
SUPERNAT COLOR CSF: ABNORMAL
TOTAL PROTEIN, URINE: 20 MG/DL
TOTAL PROTEIN: 6.4 G/DL (ref 6.4–8.3)
TROPONIN INTERP: ABNORMAL
TROPONIN INTERP: NORMAL
TROPONIN T: ABNORMAL NG/ML
TROPONIN T: NORMAL NG/ML
TROPONIN, HIGH SENSITIVITY: 18 NG/L (ref 0–22)
TROPONIN, HIGH SENSITIVITY: 20 NG/L (ref 0–22)
TROPONIN, HIGH SENSITIVITY: 20 NG/L (ref 0–22)
TROPONIN, HIGH SENSITIVITY: 25 NG/L (ref 0–22)
TUBE NUMBER CSF: 3
VANCOMYCIN RANDOM DATE LAST DOSE: NORMAL
VANCOMYCIN RANDOM DOSE AMOUNT: NORMAL
VANCOMYCIN RANDOM TIME LAST DOSE: NORMAL
VANCOMYCIN RANDOM: 8.2 UG/ML
VARICELLA-ZOSTER CSF FILM ARRAY: NOT DETECTED
VDRL CSF SCREEN: NONREACTIVE
VOLUME CSF: 8
WBC # BLD: 26.2 K/UL (ref 3.5–11.3)
WBC # BLD: ABNORMAL 10*3/UL
WBC CSF: 9 /MM3
XANTHOCHROMIA: ABNORMAL

## 2019-09-06 PROCEDURE — 84484 ASSAY OF TROPONIN QUANT: CPT

## 2019-09-06 PROCEDURE — 2580000003 HC RX 258: Performed by: STUDENT IN AN ORGANIZED HEALTH CARE EDUCATION/TRAINING PROGRAM

## 2019-09-06 PROCEDURE — 76937 US GUIDE VASCULAR ACCESS: CPT

## 2019-09-06 PROCEDURE — 82945 GLUCOSE OTHER FLUID: CPT

## 2019-09-06 PROCEDURE — 99291 CRITICAL CARE FIRST HOUR: CPT | Performed by: INTERNAL MEDICINE

## 2019-09-06 PROCEDURE — 6360000002 HC RX W HCPCS: Performed by: STUDENT IN AN ORGANIZED HEALTH CARE EDUCATION/TRAINING PROGRAM

## 2019-09-06 PROCEDURE — 86160 COMPLEMENT ANTIGEN: CPT

## 2019-09-06 PROCEDURE — 6360000002 HC RX W HCPCS: Performed by: NURSE PRACTITIONER

## 2019-09-06 PROCEDURE — 82330 ASSAY OF CALCIUM: CPT

## 2019-09-06 PROCEDURE — 86592 SYPHILIS TEST NON-TREP QUAL: CPT

## 2019-09-06 PROCEDURE — 93010 ELECTROCARDIOGRAM REPORT: CPT | Performed by: INTERNAL MEDICINE

## 2019-09-06 PROCEDURE — 83735 ASSAY OF MAGNESIUM: CPT

## 2019-09-06 PROCEDURE — 84100 ASSAY OF PHOSPHORUS: CPT

## 2019-09-06 PROCEDURE — 86738 MYCOPLASMA ANTIBODY: CPT

## 2019-09-06 PROCEDURE — 85025 COMPLETE CBC W/AUTO DIFF WBC: CPT

## 2019-09-06 PROCEDURE — 2580000003 HC RX 258: Performed by: INTERNAL MEDICINE

## 2019-09-06 PROCEDURE — 85610 PROTHROMBIN TIME: CPT

## 2019-09-06 PROCEDURE — 6370000000 HC RX 637 (ALT 250 FOR IP): Performed by: RADIOLOGY

## 2019-09-06 PROCEDURE — 84157 ASSAY OF PROTEIN OTHER: CPT

## 2019-09-06 PROCEDURE — 87070 CULTURE OTHR SPECIMN AEROBIC: CPT

## 2019-09-06 PROCEDURE — 80202 ASSAY OF VANCOMYCIN: CPT

## 2019-09-06 PROCEDURE — 36415 COLL VENOUS BLD VENIPUNCTURE: CPT

## 2019-09-06 PROCEDURE — 95819 EEG AWAKE AND ASLEEP: CPT | Performed by: PSYCHIATRY & NEUROLOGY

## 2019-09-06 PROCEDURE — 87102 FUNGUS ISOLATION CULTURE: CPT

## 2019-09-06 PROCEDURE — 95816 EEG AWAKE AND DROWSY: CPT

## 2019-09-06 PROCEDURE — 87205 SMEAR GRAM STAIN: CPT

## 2019-09-06 PROCEDURE — 99254 IP/OBS CNSLTJ NEW/EST MOD 60: CPT | Performed by: INTERNAL MEDICINE

## 2019-09-06 PROCEDURE — 86038 ANTINUCLEAR ANTIBODIES: CPT

## 2019-09-06 PROCEDURE — 62270 DX LMBR SPI PNXR: CPT | Performed by: RADIOLOGY

## 2019-09-06 PROCEDURE — 83516 IMMUNOASSAY NONANTIBODY: CPT

## 2019-09-06 PROCEDURE — 2709999900 HC NON-CHARGEABLE SUPPLY

## 2019-09-06 PROCEDURE — 2500000003 HC RX 250 WO HCPCS: Performed by: STUDENT IN AN ORGANIZED HEALTH CARE EDUCATION/TRAINING PROGRAM

## 2019-09-06 PROCEDURE — 76770 US EXAM ABDO BACK WALL COMP: CPT

## 2019-09-06 PROCEDURE — 84156 ASSAY OF PROTEIN URINE: CPT

## 2019-09-06 PROCEDURE — 80053 COMPREHEN METABOLIC PANEL: CPT

## 2019-09-06 PROCEDURE — 93306 TTE W/DOPPLER COMPLETE: CPT

## 2019-09-06 PROCEDURE — 87389 HIV-1 AG W/HIV-1&-2 AB AG IA: CPT

## 2019-09-06 PROCEDURE — 71045 X-RAY EXAM CHEST 1 VIEW: CPT

## 2019-09-06 PROCEDURE — 009U3ZX DRAINAGE OF SPINAL CANAL, PERCUTANEOUS APPROACH, DIAGNOSTIC: ICD-10-PCS | Performed by: RADIOLOGY

## 2019-09-06 PROCEDURE — 99254 IP/OBS CNSLTJ NEW/EST MOD 60: CPT | Performed by: NURSE PRACTITIONER

## 2019-09-06 PROCEDURE — 83605 ASSAY OF LACTIC ACID: CPT

## 2019-09-06 PROCEDURE — 87483 CNS DNA AMP PROBE TYPE 12-25: CPT

## 2019-09-06 PROCEDURE — 89051 BODY FLUID CELL COUNT: CPT

## 2019-09-06 PROCEDURE — 2000000000 HC ICU R&B

## 2019-09-06 PROCEDURE — 82570 ASSAY OF URINE CREATININE: CPT

## 2019-09-06 PROCEDURE — 87015 SPECIMEN INFECT AGNT CONCNTJ: CPT

## 2019-09-06 PROCEDURE — 77003 FLUOROGUIDE FOR SPINE INJECT: CPT | Performed by: RADIOLOGY

## 2019-09-06 PROCEDURE — 85730 THROMBOPLASTIN TIME PARTIAL: CPT

## 2019-09-06 PROCEDURE — 84145 PROCALCITONIN (PCT): CPT

## 2019-09-06 RX ORDER — LORAZEPAM 2 MG/ML
2 INJECTION INTRAMUSCULAR
Status: COMPLETED | OUTPATIENT
Start: 2019-09-06 | End: 2019-09-06

## 2019-09-06 RX ORDER — ACETAMINOPHEN 325 MG/1
650 TABLET ORAL EVERY 4 HOURS PRN
Status: DISCONTINUED | OUTPATIENT
Start: 2019-09-06 | End: 2019-09-10 | Stop reason: HOSPADM

## 2019-09-06 RX ORDER — SODIUM CHLORIDE, SODIUM LACTATE, POTASSIUM CHLORIDE, CALCIUM CHLORIDE 600; 310; 30; 20 MG/100ML; MG/100ML; MG/100ML; MG/100ML
INJECTION, SOLUTION INTRAVENOUS CONTINUOUS
Status: DISCONTINUED | OUTPATIENT
Start: 2019-09-06 | End: 2019-09-06

## 2019-09-06 RX ORDER — LORAZEPAM 2 MG/ML
2 INJECTION INTRAMUSCULAR
Status: DISPENSED | OUTPATIENT
Start: 2019-09-06 | End: 2019-09-06

## 2019-09-06 RX ORDER — VANCOMYCIN HYDROCHLORIDE 1 G/200ML
1000 INJECTION, SOLUTION INTRAVENOUS EVERY 12 HOURS
Status: DISCONTINUED | OUTPATIENT
Start: 2019-09-06 | End: 2019-09-06

## 2019-09-06 RX ADMIN — PIPERACILLIN AND TAZOBACTAM 3.38 G: 3; .375 INJECTION, POWDER, FOR SOLUTION INTRAVENOUS at 14:21

## 2019-09-06 RX ADMIN — Medication 10 ML: at 19:14

## 2019-09-06 RX ADMIN — Medication: at 01:13

## 2019-09-06 RX ADMIN — SODIUM CHLORIDE: 9 INJECTION, SOLUTION INTRAVENOUS at 13:41

## 2019-09-06 RX ADMIN — SODIUM CHLORIDE: 9 INJECTION, SOLUTION INTRAVENOUS at 21:08

## 2019-09-06 RX ADMIN — ACETAMINOPHEN 650 MG: 325 TABLET ORAL at 19:13

## 2019-09-06 RX ADMIN — LORAZEPAM 2 MG: 2 INJECTION INTRAMUSCULAR; INTRAVENOUS at 12:50

## 2019-09-06 RX ADMIN — SODIUM CHLORIDE, POTASSIUM CHLORIDE, SODIUM LACTATE AND CALCIUM CHLORIDE: 600; 310; 30; 20 INJECTION, SOLUTION INTRAVENOUS at 13:45

## 2019-09-06 ASSESSMENT — PAIN DESCRIPTION - FREQUENCY
FREQUENCY: INTERMITTENT
FREQUENCY: CONTINUOUS

## 2019-09-06 ASSESSMENT — PAIN DESCRIPTION - ONSET
ONSET: ON-GOING
ONSET: ON-GOING

## 2019-09-06 ASSESSMENT — PAIN DESCRIPTION - PAIN TYPE
TYPE: ACUTE PAIN
TYPE: ACUTE PAIN

## 2019-09-06 ASSESSMENT — PAIN DESCRIPTION - DESCRIPTORS: DESCRIPTORS: SHARP

## 2019-09-06 ASSESSMENT — PAIN - FUNCTIONAL ASSESSMENT: PAIN_FUNCTIONAL_ASSESSMENT: ACTIVITIES ARE NOT PREVENTED

## 2019-09-06 ASSESSMENT — PAIN SCALES - GENERAL
PAINLEVEL_OUTOF10: 7
PAINLEVEL_OUTOF10: 8
PAINLEVEL_OUTOF10: 6

## 2019-09-06 ASSESSMENT — PAIN DESCRIPTION - ORIENTATION
ORIENTATION: LEFT
ORIENTATION: MID

## 2019-09-06 ASSESSMENT — PAIN DESCRIPTION - LOCATION
LOCATION: CHEST
LOCATION: CHEST

## 2019-09-06 ASSESSMENT — PAIN DESCRIPTION - PROGRESSION: CLINICAL_PROGRESSION: NOT CHANGED

## 2019-09-07 ENCOUNTER — APPOINTMENT (OUTPATIENT)
Dept: GENERAL RADIOLOGY | Age: 39
DRG: 139 | End: 2019-09-07
Payer: MEDICAID

## 2019-09-07 PROBLEM — M62.82 RHABDOMYOLYSIS: Status: ACTIVE | Noted: 2019-09-07

## 2019-09-07 LAB
ABSOLUTE EOS #: 0 K/UL (ref 0–0.4)
ABSOLUTE IMMATURE GRANULOCYTE: 0 K/UL (ref 0–0.3)
ABSOLUTE LYMPH #: 4.46 K/UL (ref 1–4.8)
ABSOLUTE MONO #: 1.49 K/UL (ref 0.1–0.8)
ALBUMIN SERPL-MCNC: 3.2 G/DL (ref 3.5–5.2)
ALBUMIN/GLOBULIN RATIO: 0.8 (ref 1–2.5)
ALP BLD-CCNC: 72 U/L (ref 40–129)
ALT SERPL-CCNC: 26 U/L (ref 5–41)
ANION GAP SERPL CALCULATED.3IONS-SCNC: 10 MMOL/L (ref 9–17)
AST SERPL-CCNC: 28 U/L
BANDS, CSF: NORMAL %
BASO CSF: NORMAL %
BASOPHILS # BLD: 0 % (ref 0–2)
BASOPHILS ABSOLUTE: 0 K/UL (ref 0–0.2)
BILIRUB SERPL-MCNC: 0.43 MG/DL (ref 0.3–1.2)
BLAST CSF: NORMAL %
BUN BLDV-MCNC: 13 MG/DL (ref 6–20)
BUN/CREAT BLD: ABNORMAL (ref 9–20)
CALCIUM IONIZED: 1.11 MMOL/L (ref 1.13–1.33)
CALCIUM SERPL-MCNC: 8.7 MG/DL (ref 8.6–10.4)
CHLORIDE BLD-SCNC: 102 MMOL/L (ref 98–107)
CO2: 23 MMOL/L (ref 20–31)
CREAT SERPL-MCNC: 0.88 MG/DL (ref 0.7–1.2)
DIFFERENTIAL TYPE: ABNORMAL
EOS CSF: NORMAL %
EOSINOPHILS RELATIVE PERCENT: 0 % (ref 1–4)
FLUID DIFF COMMENT: NORMAL
GFR AFRICAN AMERICAN: >60 ML/MIN
GFR NON-AFRICAN AMERICAN: >60 ML/MIN
GFR SERPL CREATININE-BSD FRML MDRD: ABNORMAL ML/MIN/{1.73_M2}
GFR SERPL CREATININE-BSD FRML MDRD: ABNORMAL ML/MIN/{1.73_M2}
GLUCOSE BLD-MCNC: 104 MG/DL (ref 70–99)
HCT VFR BLD CALC: 38 % (ref 40.7–50.3)
HEMOGLOBIN: 12.9 G/DL (ref 13–17)
IMMATURE GRANULOCYTES: 0 %
LACTIC ACID, SEPSIS WHOLE BLOOD: 1.5 MMOL/L (ref 0.5–1.9)
LACTIC ACID, SEPSIS: NORMAL MMOL/L (ref 0.5–1.9)
LYMPHOCYTES # BLD: 12 % (ref 24–44)
LYMPHS CSF: 86 %
MAGNESIUM: 1.7 MG/DL (ref 1.6–2.6)
MAGNESIUM: 2.1 MG/DL (ref 1.6–2.6)
MCH RBC QN AUTO: 31.2 PG (ref 25.2–33.5)
MCHC RBC AUTO-ENTMCNC: 33.9 G/DL (ref 28.4–34.8)
MCV RBC AUTO: 91.8 FL (ref 82.6–102.9)
METAYELO CSF: NORMAL %
MONO/MACROPHAGE CSF (MANUAL): NORMAL %
MONOCYTES # BLD: 4 % (ref 1–7)
MORPHOLOGY: NORMAL
MYELOCYTE CSF: NORMAL %
NEUTROPHILS, CSF: 0 %
NRBC AUTOMATED: 0.3 PER 100 WBC
NUCLEATED RED BLOOD CELLS: 3 PER 100 WBC
OTHER CELLS FLUID: NORMAL %
PDW BLD-RTO: 13.9 % (ref 11.8–14.4)
PHOSPHORUS: 1.5 MG/DL (ref 2.5–4.5)
PLATELET # BLD: 303 K/UL (ref 138–453)
PLATELET ESTIMATE: ABNORMAL
PMV BLD AUTO: 10 FL (ref 8.1–13.5)
POTASSIUM SERPL-SCNC: 3.9 MMOL/L (ref 3.7–5.3)
POTASSIUM SERPL-SCNC: 4.4 MMOL/L (ref 3.7–5.3)
RBC # BLD: 4.14 M/UL (ref 4.21–5.77)
RBC # BLD: ABNORMAL 10*6/UL
SEG NEUTROPHILS: 84 % (ref 36–66)
SEGMENTED NEUTROPHILS ABSOLUTE COUNT: 31.25 K/UL (ref 1.8–7.7)
SODIUM BLD-SCNC: 135 MMOL/L (ref 135–144)
TOTAL PROTEIN: 7 G/DL (ref 6.4–8.3)
TROPONIN INTERP: NORMAL
TROPONIN T: NORMAL NG/ML
TROPONIN, HIGH SENSITIVITY: 13 NG/L (ref 0–22)
TROPONIN, HIGH SENSITIVITY: 14 NG/L (ref 0–22)
TROPONIN, HIGH SENSITIVITY: 14 NG/L (ref 0–22)
TROPONIN, HIGH SENSITIVITY: 15 NG/L (ref 0–22)
TROPONIN, HIGH SENSITIVITY: 17 NG/L (ref 0–22)
WBC # BLD: 37.2 K/UL (ref 3.5–11.3)
WBC # BLD: ABNORMAL 10*3/UL

## 2019-09-07 PROCEDURE — 99291 CRITICAL CARE FIRST HOUR: CPT | Performed by: INTERNAL MEDICINE

## 2019-09-07 PROCEDURE — 80053 COMPREHEN METABOLIC PANEL: CPT

## 2019-09-07 PROCEDURE — 84132 ASSAY OF SERUM POTASSIUM: CPT

## 2019-09-07 PROCEDURE — 6370000000 HC RX 637 (ALT 250 FOR IP): Performed by: INTERNAL MEDICINE

## 2019-09-07 PROCEDURE — 6370000000 HC RX 637 (ALT 250 FOR IP): Performed by: NURSE PRACTITIONER

## 2019-09-07 PROCEDURE — 6370000000 HC RX 637 (ALT 250 FOR IP): Performed by: STUDENT IN AN ORGANIZED HEALTH CARE EDUCATION/TRAINING PROGRAM

## 2019-09-07 PROCEDURE — G0328 FECAL BLOOD SCRN IMMUNOASSAY: HCPCS

## 2019-09-07 PROCEDURE — 99233 SBSQ HOSP IP/OBS HIGH 50: CPT | Performed by: INTERNAL MEDICINE

## 2019-09-07 PROCEDURE — 74018 RADEX ABDOMEN 1 VIEW: CPT

## 2019-09-07 PROCEDURE — 83735 ASSAY OF MAGNESIUM: CPT

## 2019-09-07 PROCEDURE — 84100 ASSAY OF PHOSPHORUS: CPT

## 2019-09-07 PROCEDURE — 99232 SBSQ HOSP IP/OBS MODERATE 35: CPT | Performed by: NURSE PRACTITIONER

## 2019-09-07 PROCEDURE — 2580000003 HC RX 258: Performed by: STUDENT IN AN ORGANIZED HEALTH CARE EDUCATION/TRAINING PROGRAM

## 2019-09-07 PROCEDURE — 83605 ASSAY OF LACTIC ACID: CPT

## 2019-09-07 PROCEDURE — 84484 ASSAY OF TROPONIN QUANT: CPT

## 2019-09-07 PROCEDURE — 6360000002 HC RX W HCPCS: Performed by: INTERNAL MEDICINE

## 2019-09-07 PROCEDURE — 6360000002 HC RX W HCPCS: Performed by: STUDENT IN AN ORGANIZED HEALTH CARE EDUCATION/TRAINING PROGRAM

## 2019-09-07 PROCEDURE — 82330 ASSAY OF CALCIUM: CPT

## 2019-09-07 PROCEDURE — 6370000000 HC RX 637 (ALT 250 FOR IP): Performed by: RADIOLOGY

## 2019-09-07 PROCEDURE — 85025 COMPLETE CBC W/AUTO DIFF WBC: CPT

## 2019-09-07 PROCEDURE — 6360000002 HC RX W HCPCS: Performed by: NURSE PRACTITIONER

## 2019-09-07 PROCEDURE — 1200000000 HC SEMI PRIVATE

## 2019-09-07 PROCEDURE — 99222 1ST HOSP IP/OBS MODERATE 55: CPT | Performed by: INTERNAL MEDICINE

## 2019-09-07 PROCEDURE — 36415 COLL VENOUS BLD VENIPUNCTURE: CPT

## 2019-09-07 RX ORDER — LORAZEPAM 0.5 MG/1
0.5 TABLET ORAL ONCE
Status: COMPLETED | OUTPATIENT
Start: 2019-09-07 | End: 2019-09-07

## 2019-09-07 RX ORDER — LEVOFLOXACIN 5 MG/ML
750 INJECTION, SOLUTION INTRAVENOUS EVERY 24 HOURS
Status: DISCONTINUED | OUTPATIENT
Start: 2019-09-07 | End: 2019-09-09 | Stop reason: ALTCHOICE

## 2019-09-07 RX ORDER — BENZONATATE 100 MG/1
100 CAPSULE ORAL 3 TIMES DAILY PRN
Status: DISCONTINUED | OUTPATIENT
Start: 2019-09-07 | End: 2019-09-10 | Stop reason: HOSPADM

## 2019-09-07 RX ORDER — KETOROLAC TROMETHAMINE 30 MG/ML
30 INJECTION, SOLUTION INTRAMUSCULAR; INTRAVENOUS ONCE
Status: COMPLETED | OUTPATIENT
Start: 2019-09-07 | End: 2019-09-07

## 2019-09-07 RX ORDER — GUAIFENESIN 600 MG/1
600 TABLET, EXTENDED RELEASE ORAL 2 TIMES DAILY
Status: DISCONTINUED | OUTPATIENT
Start: 2019-09-07 | End: 2019-09-10 | Stop reason: HOSPADM

## 2019-09-07 RX ORDER — TRAMADOL HYDROCHLORIDE 50 MG/1
25 TABLET ORAL ONCE
Status: COMPLETED | OUTPATIENT
Start: 2019-09-07 | End: 2019-09-07

## 2019-09-07 RX ADMIN — BENZONATATE 100 MG: 100 CAPSULE ORAL at 19:10

## 2019-09-07 RX ADMIN — LORAZEPAM 0.5 MG: 0.5 TABLET ORAL at 16:29

## 2019-09-07 RX ADMIN — ACETAMINOPHEN 650 MG: 325 TABLET ORAL at 12:22

## 2019-09-07 RX ADMIN — ACETAMINOPHEN 650 MG: 325 TABLET ORAL at 08:20

## 2019-09-07 RX ADMIN — DIBASIC SODIUM PHOSPHATE, MONOBASIC POTASSIUM PHOSPHATE AND MONOBASIC SODIUM PHOSPHATE: 852; 155; 130 TABLET ORAL at 12:18

## 2019-09-07 RX ADMIN — DIBASIC SODIUM PHOSPHATE, MONOBASIC POTASSIUM PHOSPHATE AND MONOBASIC SODIUM PHOSPHATE 1 TABLET: 852; 155; 130 TABLET ORAL at 22:23

## 2019-09-07 RX ADMIN — TRAMADOL HYDROCHLORIDE 25 MG: 50 TABLET, FILM COATED ORAL at 19:10

## 2019-09-07 RX ADMIN — KETOROLAC TROMETHAMINE 30 MG: 30 INJECTION, SOLUTION INTRAMUSCULAR; INTRAVENOUS at 22:24

## 2019-09-07 RX ADMIN — Medication 10 ML: at 08:07

## 2019-09-07 RX ADMIN — ACETAMINOPHEN 650 MG: 325 TABLET ORAL at 00:10

## 2019-09-07 RX ADMIN — ACETAMINOPHEN 650 MG: 325 TABLET ORAL at 04:10

## 2019-09-07 RX ADMIN — SODIUM CHLORIDE 100 ML/HR: 9 INJECTION, SOLUTION INTRAVENOUS at 10:15

## 2019-09-07 RX ADMIN — GUAIFENESIN 600 MG: 600 TABLET, EXTENDED RELEASE ORAL at 22:23

## 2019-09-07 RX ADMIN — LORAZEPAM 0.5 MG: 0.5 TABLET ORAL at 22:22

## 2019-09-07 RX ADMIN — ACETAMINOPHEN 650 MG: 325 TABLET ORAL at 22:22

## 2019-09-07 RX ADMIN — LEVOFLOXACIN 750 MG: 5 INJECTION, SOLUTION INTRAVENOUS at 12:14

## 2019-09-07 RX ADMIN — PIPERACILLIN AND TAZOBACTAM 3.38 G: 3; .375 INJECTION, POWDER, FOR SOLUTION INTRAVENOUS at 08:09

## 2019-09-07 RX ADMIN — PIPERACILLIN AND TAZOBACTAM 3.38 G: 3; .375 INJECTION, POWDER, FOR SOLUTION INTRAVENOUS at 00:08

## 2019-09-07 ASSESSMENT — PAIN SCALES - GENERAL
PAINLEVEL_OUTOF10: 7
PAINLEVEL_OUTOF10: 7
PAINLEVEL_OUTOF10: 0
PAINLEVEL_OUTOF10: 8
PAINLEVEL_OUTOF10: 0
PAINLEVEL_OUTOF10: 7
PAINLEVEL_OUTOF10: 7
PAINLEVEL_OUTOF10: 3
PAINLEVEL_OUTOF10: 3

## 2019-09-07 ASSESSMENT — PAIN DESCRIPTION - DIRECTION: RADIATING_TOWARDS: RIGHT SHOULDER

## 2019-09-07 ASSESSMENT — PAIN DESCRIPTION - LOCATION: LOCATION: CHEST

## 2019-09-07 ASSESSMENT — PAIN DESCRIPTION - ORIENTATION: ORIENTATION: LEFT

## 2019-09-08 ENCOUNTER — APPOINTMENT (OUTPATIENT)
Dept: MRI IMAGING | Age: 39
DRG: 139 | End: 2019-09-08
Payer: MEDICAID

## 2019-09-08 LAB
ABSOLUTE EOS #: 0 K/UL (ref 0–0.4)
ABSOLUTE IMMATURE GRANULOCYTE: 0 K/UL (ref 0–0.3)
ABSOLUTE LYMPH #: 3.08 K/UL (ref 1–4.8)
ABSOLUTE MONO #: 0.62 K/UL (ref 0.1–0.8)
ALBUMIN SERPL-MCNC: 3.2 G/DL (ref 3.5–5.2)
ALBUMIN/GLOBULIN RATIO: 0.8 (ref 1–2.5)
ALP BLD-CCNC: 72 U/L (ref 40–129)
ALT SERPL-CCNC: 20 U/L (ref 5–41)
ANION GAP SERPL CALCULATED.3IONS-SCNC: 13 MMOL/L (ref 9–17)
AST SERPL-CCNC: 22 U/L
BASOPHILS # BLD: 0 % (ref 0–2)
BASOPHILS ABSOLUTE: 0 K/UL (ref 0–0.2)
BILIRUB SERPL-MCNC: 0.28 MG/DL (ref 0.3–1.2)
BUN BLDV-MCNC: 11 MG/DL (ref 6–20)
BUN/CREAT BLD: ABNORMAL (ref 9–20)
CALCIUM IONIZED: 1.16 MMOL/L (ref 1.13–1.33)
CALCIUM SERPL-MCNC: 8.8 MG/DL (ref 8.6–10.4)
CHLORIDE BLD-SCNC: 101 MMOL/L (ref 98–107)
CO2: 20 MMOL/L (ref 20–31)
CREAT SERPL-MCNC: 0.75 MG/DL (ref 0.7–1.2)
DATE, STOOL #1: NORMAL
DATE, STOOL #2: NORMAL
DATE, STOOL #3: NORMAL
DIFFERENTIAL TYPE: ABNORMAL
EOSINOPHILS RELATIVE PERCENT: 0 % (ref 1–4)
GFR AFRICAN AMERICAN: >60 ML/MIN
GFR NON-AFRICAN AMERICAN: >60 ML/MIN
GFR SERPL CREATININE-BSD FRML MDRD: ABNORMAL ML/MIN/{1.73_M2}
GFR SERPL CREATININE-BSD FRML MDRD: ABNORMAL ML/MIN/{1.73_M2}
GLUCOSE BLD-MCNC: 89 MG/DL (ref 70–99)
HCT VFR BLD CALC: 39.4 % (ref 40.7–50.3)
HEMOCCULT SP1 STL QL: NEGATIVE
HEMOCCULT SP2 STL QL: NORMAL
HEMOCCULT SP3 STL QL: NORMAL
HEMOGLOBIN: 12.9 G/DL (ref 13–17)
IMMATURE GRANULOCYTES: 0 %
LYMPHOCYTES # BLD: 10 % (ref 24–44)
MAGNESIUM: 1.9 MG/DL (ref 1.6–2.6)
MCH RBC QN AUTO: 30.4 PG (ref 25.2–33.5)
MCHC RBC AUTO-ENTMCNC: 32.7 G/DL (ref 28.4–34.8)
MCV RBC AUTO: 92.9 FL (ref 82.6–102.9)
MONOCYTES # BLD: 2 % (ref 1–7)
MORPHOLOGY: NORMAL
NRBC AUTOMATED: 0.3 PER 100 WBC
PDW BLD-RTO: 14.4 % (ref 11.8–14.4)
PHOSPHORUS: 1.9 MG/DL (ref 2.5–4.5)
PLATELET # BLD: 309 K/UL (ref 138–453)
PLATELET ESTIMATE: ABNORMAL
PMV BLD AUTO: 9.9 FL (ref 8.1–13.5)
POTASSIUM SERPL-SCNC: 4 MMOL/L (ref 3.7–5.3)
RBC # BLD: 4.24 M/UL (ref 4.21–5.77)
RBC # BLD: ABNORMAL 10*6/UL
SEG NEUTROPHILS: 88 % (ref 36–66)
SEGMENTED NEUTROPHILS ABSOLUTE COUNT: 27.1 K/UL (ref 1.8–7.7)
SODIUM BLD-SCNC: 134 MMOL/L (ref 135–144)
TIME, STOOL #1: NORMAL
TIME, STOOL #2: NORMAL
TIME, STOOL #3: NORMAL
TOTAL PROTEIN: 7.2 G/DL (ref 6.4–8.3)
TROPONIN INTERP: NORMAL
TROPONIN INTERP: NORMAL
TROPONIN T: NORMAL NG/ML
TROPONIN T: NORMAL NG/ML
TROPONIN, HIGH SENSITIVITY: 16 NG/L (ref 0–22)
TROPONIN, HIGH SENSITIVITY: 16 NG/L (ref 0–22)
WBC # BLD: 30.8 K/UL (ref 3.5–11.3)
WBC # BLD: ABNORMAL 10*3/UL

## 2019-09-08 PROCEDURE — 2580000003 HC RX 258: Performed by: STUDENT IN AN ORGANIZED HEALTH CARE EDUCATION/TRAINING PROGRAM

## 2019-09-08 PROCEDURE — 6360000002 HC RX W HCPCS: Performed by: INTERNAL MEDICINE

## 2019-09-08 PROCEDURE — 70553 MRI BRAIN STEM W/O & W/DYE: CPT

## 2019-09-08 PROCEDURE — 84100 ASSAY OF PHOSPHORUS: CPT

## 2019-09-08 PROCEDURE — 36415 COLL VENOUS BLD VENIPUNCTURE: CPT

## 2019-09-08 PROCEDURE — 99232 SBSQ HOSP IP/OBS MODERATE 35: CPT | Performed by: INTERNAL MEDICINE

## 2019-09-08 PROCEDURE — 6370000000 HC RX 637 (ALT 250 FOR IP): Performed by: RADIOLOGY

## 2019-09-08 PROCEDURE — 80053 COMPREHEN METABOLIC PANEL: CPT

## 2019-09-08 PROCEDURE — 6360000004 HC RX CONTRAST MEDICATION: Performed by: NURSE PRACTITIONER

## 2019-09-08 PROCEDURE — 6370000000 HC RX 637 (ALT 250 FOR IP): Performed by: STUDENT IN AN ORGANIZED HEALTH CARE EDUCATION/TRAINING PROGRAM

## 2019-09-08 PROCEDURE — 1200000000 HC SEMI PRIVATE

## 2019-09-08 PROCEDURE — 99232 SBSQ HOSP IP/OBS MODERATE 35: CPT | Performed by: NURSE PRACTITIONER

## 2019-09-08 PROCEDURE — 82330 ASSAY OF CALCIUM: CPT

## 2019-09-08 PROCEDURE — 6370000000 HC RX 637 (ALT 250 FOR IP): Performed by: INTERNAL MEDICINE

## 2019-09-08 PROCEDURE — A9576 INJ PROHANCE MULTIPACK: HCPCS | Performed by: NURSE PRACTITIONER

## 2019-09-08 PROCEDURE — 6360000002 HC RX W HCPCS: Performed by: NURSE PRACTITIONER

## 2019-09-08 PROCEDURE — 6370000000 HC RX 637 (ALT 250 FOR IP): Performed by: NURSE PRACTITIONER

## 2019-09-08 PROCEDURE — 84484 ASSAY OF TROPONIN QUANT: CPT

## 2019-09-08 PROCEDURE — 85025 COMPLETE CBC W/AUTO DIFF WBC: CPT

## 2019-09-08 PROCEDURE — 83735 ASSAY OF MAGNESIUM: CPT

## 2019-09-08 RX ORDER — LORAZEPAM 2 MG/ML
0.5 INJECTION INTRAMUSCULAR
Status: COMPLETED | OUTPATIENT
Start: 2019-09-08 | End: 2019-09-08

## 2019-09-08 RX ORDER — KETOROLAC TROMETHAMINE 30 MG/ML
30 INJECTION, SOLUTION INTRAMUSCULAR; INTRAVENOUS ONCE
Status: COMPLETED | OUTPATIENT
Start: 2019-09-08 | End: 2019-09-08

## 2019-09-08 RX ORDER — ALPRAZOLAM 0.25 MG/1
0.25 TABLET ORAL 3 TIMES DAILY PRN
Status: DISCONTINUED | OUTPATIENT
Start: 2019-09-08 | End: 2019-09-10 | Stop reason: HOSPADM

## 2019-09-08 RX ORDER — LORAZEPAM 0.5 MG/1
0.5 TABLET ORAL ONCE
Status: COMPLETED | OUTPATIENT
Start: 2019-09-08 | End: 2019-09-08

## 2019-09-08 RX ORDER — SODIUM CHLORIDE 0.9 % (FLUSH) 0.9 %
10 SYRINGE (ML) INJECTION 2 TIMES DAILY
Status: DISCONTINUED | OUTPATIENT
Start: 2019-09-08 | End: 2019-09-10 | Stop reason: HOSPADM

## 2019-09-08 RX ORDER — TRAMADOL HYDROCHLORIDE 50 MG/1
50 TABLET ORAL EVERY 6 HOURS PRN
Status: DISCONTINUED | OUTPATIENT
Start: 2019-09-08 | End: 2019-09-10 | Stop reason: HOSPADM

## 2019-09-08 RX ADMIN — GUAIFENESIN 600 MG: 600 TABLET, EXTENDED RELEASE ORAL at 08:27

## 2019-09-08 RX ADMIN — GADOTERIDOL 14 ML: 279.3 INJECTION, SOLUTION INTRAVENOUS at 12:16

## 2019-09-08 RX ADMIN — SODIUM CHLORIDE: 9 INJECTION, SOLUTION INTRAVENOUS at 04:12

## 2019-09-08 RX ADMIN — ACETAMINOPHEN 650 MG: 325 TABLET ORAL at 17:34

## 2019-09-08 RX ADMIN — LEVOFLOXACIN 750 MG: 5 INJECTION, SOLUTION INTRAVENOUS at 13:20

## 2019-09-08 RX ADMIN — TRAMADOL HYDROCHLORIDE 50 MG: 50 TABLET, FILM COATED ORAL at 23:05

## 2019-09-08 RX ADMIN — ALPRAZOLAM 0.25 MG: 0.25 TABLET ORAL at 23:05

## 2019-09-08 RX ADMIN — ALPRAZOLAM 0.25 MG: 0.25 TABLET ORAL at 17:34

## 2019-09-08 RX ADMIN — KETOROLAC TROMETHAMINE 30 MG: 30 INJECTION, SOLUTION INTRAMUSCULAR; INTRAVENOUS at 04:12

## 2019-09-08 RX ADMIN — ACETAMINOPHEN 650 MG: 325 TABLET ORAL at 23:05

## 2019-09-08 RX ADMIN — ACETAMINOPHEN 650 MG: 325 TABLET ORAL at 10:55

## 2019-09-08 RX ADMIN — BENZONATATE 100 MG: 100 CAPSULE ORAL at 10:55

## 2019-09-08 RX ADMIN — LORAZEPAM 0.5 MG: 0.5 TABLET ORAL at 04:12

## 2019-09-08 RX ADMIN — Medication 10 ML: at 08:28

## 2019-09-08 RX ADMIN — BENZONATATE 100 MG: 100 CAPSULE ORAL at 17:33

## 2019-09-08 RX ADMIN — TRAMADOL HYDROCHLORIDE 50 MG: 50 TABLET, FILM COATED ORAL at 10:54

## 2019-09-08 RX ADMIN — LORAZEPAM 0.5 MG: 2 INJECTION INTRAMUSCULAR; INTRAVENOUS at 11:21

## 2019-09-08 RX ADMIN — DIBASIC SODIUM PHOSPHATE, MONOBASIC POTASSIUM PHOSPHATE AND MONOBASIC SODIUM PHOSPHATE 1 TABLET: 852; 155; 130 TABLET ORAL at 08:27

## 2019-09-08 RX ADMIN — TRAMADOL HYDROCHLORIDE 50 MG: 50 TABLET, FILM COATED ORAL at 17:34

## 2019-09-08 RX ADMIN — BENZONATATE 100 MG: 100 CAPSULE ORAL at 02:54

## 2019-09-08 RX ADMIN — GUAIFENESIN 600 MG: 600 TABLET, EXTENDED RELEASE ORAL at 23:05

## 2019-09-08 ASSESSMENT — PAIN SCALES - GENERAL
PAINLEVEL_OUTOF10: 7
PAINLEVEL_OUTOF10: 8
PAINLEVEL_OUTOF10: 10
PAINLEVEL_OUTOF10: 8

## 2019-09-08 ASSESSMENT — PAIN DESCRIPTION - PROGRESSION

## 2019-09-09 LAB
ABSOLUTE EOS #: 0 K/UL (ref 0–0.4)
ABSOLUTE IMMATURE GRANULOCYTE: 0.4 K/UL (ref 0–0.3)
ABSOLUTE LYMPH #: 3.22 K/UL (ref 1–4.8)
ABSOLUTE MONO #: 2.01 K/UL (ref 0.1–0.8)
ALBUMIN SERPL-MCNC: 2.8 G/DL (ref 3.5–5.2)
ALBUMIN/GLOBULIN RATIO: 0.7 (ref 1–2.5)
ALP BLD-CCNC: 63 U/L (ref 40–129)
ALT SERPL-CCNC: 14 U/L (ref 5–41)
ANION GAP SERPL CALCULATED.3IONS-SCNC: 12 MMOL/L (ref 9–17)
ANTI-NUCLEAR ANTIBODY (ANA): NEGATIVE
AST SERPL-CCNC: 14 U/L
BASOPHILS # BLD: 0 % (ref 0–2)
BASOPHILS ABSOLUTE: 0 K/UL (ref 0–0.2)
BILIRUB SERPL-MCNC: 0.18 MG/DL (ref 0.3–1.2)
BUN BLDV-MCNC: 6 MG/DL (ref 6–20)
BUN/CREAT BLD: ABNORMAL (ref 9–20)
CALCIUM IONIZED: 1.17 MMOL/L (ref 1.13–1.33)
CALCIUM SERPL-MCNC: 8.4 MG/DL (ref 8.6–10.4)
CHLORIDE BLD-SCNC: 102 MMOL/L (ref 98–107)
CO2: 21 MMOL/L (ref 20–31)
CREAT SERPL-MCNC: 0.75 MG/DL (ref 0.7–1.2)
CULTURE: ABNORMAL
DIFFERENTIAL TYPE: ABNORMAL
DIRECT EXAM: ABNORMAL
EOSINOPHILS RELATIVE PERCENT: 0 % (ref 1–4)
GFR AFRICAN AMERICAN: >60 ML/MIN
GFR NON-AFRICAN AMERICAN: >60 ML/MIN
GFR SERPL CREATININE-BSD FRML MDRD: ABNORMAL ML/MIN/{1.73_M2}
GFR SERPL CREATININE-BSD FRML MDRD: ABNORMAL ML/MIN/{1.73_M2}
GLUCOSE BLD-MCNC: 123 MG/DL (ref 70–99)
HCT VFR BLD CALC: 38.1 % (ref 40.7–50.3)
HEMOGLOBIN: 12.5 G/DL (ref 13–17)
IMMATURE GRANULOCYTES: 2 %
INTERVENTION: NORMAL
LYMPHOCYTES # BLD: 16 % (ref 24–44)
Lab: ABNORMAL
MAGNESIUM: 1.6 MG/DL (ref 1.6–2.6)
MCH RBC QN AUTO: 30.1 PG (ref 25.2–33.5)
MCHC RBC AUTO-ENTMCNC: 32.8 G/DL (ref 28.4–34.8)
MCV RBC AUTO: 91.8 FL (ref 82.6–102.9)
MONOCYTES # BLD: 10 % (ref 1–7)
MORPHOLOGY: NORMAL
MYCOPLASMA PNEUMONIAE IGG: 1.41
MYCOPLASMA PNEUMONIAE IGM: 1.04
NRBC AUTOMATED: 0.3 PER 100 WBC
PDW BLD-RTO: 14 % (ref 11.8–14.4)
PLATELET # BLD: 297 K/UL (ref 138–453)
PLATELET ESTIMATE: ABNORMAL
PMV BLD AUTO: 9.7 FL (ref 8.1–13.5)
POTASSIUM SERPL-SCNC: 3.6 MMOL/L (ref 3.7–5.3)
RBC # BLD: 4.15 M/UL (ref 4.21–5.77)
RBC # BLD: ABNORMAL 10*6/UL
SEG NEUTROPHILS: 72 % (ref 36–66)
SEGMENTED NEUTROPHILS ABSOLUTE COUNT: 14.47 K/UL (ref 1.8–7.7)
SODIUM BLD-SCNC: 135 MMOL/L (ref 135–144)
SPECIMEN DESCRIPTION: ABNORMAL
TOTAL PROTEIN: 6.8 G/DL (ref 6.4–8.3)
WBC # BLD: 20.1 K/UL (ref 3.5–11.3)
WBC # BLD: ABNORMAL 10*3/UL

## 2019-09-09 PROCEDURE — 99232 SBSQ HOSP IP/OBS MODERATE 35: CPT | Performed by: INTERNAL MEDICINE

## 2019-09-09 PROCEDURE — 2580000003 HC RX 258: Performed by: STUDENT IN AN ORGANIZED HEALTH CARE EDUCATION/TRAINING PROGRAM

## 2019-09-09 PROCEDURE — 99232 SBSQ HOSP IP/OBS MODERATE 35: CPT | Performed by: FAMILY MEDICINE

## 2019-09-09 PROCEDURE — 36415 COLL VENOUS BLD VENIPUNCTURE: CPT

## 2019-09-09 PROCEDURE — 85025 COMPLETE CBC W/AUTO DIFF WBC: CPT

## 2019-09-09 PROCEDURE — 6370000000 HC RX 637 (ALT 250 FOR IP): Performed by: INTERNAL MEDICINE

## 2019-09-09 PROCEDURE — 6360000002 HC RX W HCPCS: Performed by: INTERNAL MEDICINE

## 2019-09-09 PROCEDURE — 80053 COMPREHEN METABOLIC PANEL: CPT

## 2019-09-09 PROCEDURE — 2580000003 HC RX 258: Performed by: NURSE PRACTITIONER

## 2019-09-09 PROCEDURE — 82330 ASSAY OF CALCIUM: CPT

## 2019-09-09 PROCEDURE — 1200000000 HC SEMI PRIVATE

## 2019-09-09 PROCEDURE — 83735 ASSAY OF MAGNESIUM: CPT

## 2019-09-09 RX ORDER — LEVOFLOXACIN 750 MG/1
750 TABLET ORAL DAILY
Status: DISCONTINUED | OUTPATIENT
Start: 2019-09-09 | End: 2019-09-10 | Stop reason: HOSPADM

## 2019-09-09 RX ADMIN — Medication 10 ML: at 08:09

## 2019-09-09 RX ADMIN — BENZONATATE 100 MG: 100 CAPSULE ORAL at 06:20

## 2019-09-09 RX ADMIN — TRAMADOL HYDROCHLORIDE 50 MG: 50 TABLET, FILM COATED ORAL at 13:57

## 2019-09-09 RX ADMIN — ALPRAZOLAM 0.25 MG: 0.25 TABLET ORAL at 06:20

## 2019-09-09 RX ADMIN — LEVOFLOXACIN 750 MG: 5 INJECTION, SOLUTION INTRAVENOUS at 12:01

## 2019-09-09 RX ADMIN — GUAIFENESIN 600 MG: 600 TABLET, EXTENDED RELEASE ORAL at 08:09

## 2019-09-09 RX ADMIN — GUAIFENESIN 600 MG: 600 TABLET, EXTENDED RELEASE ORAL at 20:23

## 2019-09-09 RX ADMIN — TRAMADOL HYDROCHLORIDE 50 MG: 50 TABLET, FILM COATED ORAL at 06:20

## 2019-09-09 RX ADMIN — Medication 10 ML: at 20:25

## 2019-09-09 RX ADMIN — ENOXAPARIN SODIUM 40 MG: 40 INJECTION SUBCUTANEOUS at 08:09

## 2019-09-09 RX ADMIN — Medication 10 ML: at 09:00

## 2019-09-09 RX ADMIN — ALPRAZOLAM 0.25 MG: 0.25 TABLET ORAL at 13:57

## 2019-09-09 RX ADMIN — ALPRAZOLAM 0.25 MG: 0.25 TABLET ORAL at 20:23

## 2019-09-09 RX ADMIN — TRAMADOL HYDROCHLORIDE 50 MG: 50 TABLET, FILM COATED ORAL at 20:23

## 2019-09-09 ASSESSMENT — PAIN SCALES - GENERAL
PAINLEVEL_OUTOF10: 6
PAINLEVEL_OUTOF10: 6
PAINLEVEL_OUTOF10: 4
PAINLEVEL_OUTOF10: 5
PAINLEVEL_OUTOF10: 6
PAINLEVEL_OUTOF10: 5

## 2019-09-09 ASSESSMENT — PAIN DESCRIPTION - PROGRESSION

## 2019-09-09 ASSESSMENT — PAIN DESCRIPTION - LOCATION
LOCATION: BACK;CHEST
LOCATION: BACK;CHEST

## 2019-09-09 ASSESSMENT — PAIN DESCRIPTION - PAIN TYPE
TYPE: ACUTE PAIN

## 2019-09-09 ASSESSMENT — PAIN DESCRIPTION - FREQUENCY
FREQUENCY: INTERMITTENT
FREQUENCY: INTERMITTENT

## 2019-09-09 ASSESSMENT — PAIN DESCRIPTION - DESCRIPTORS
DESCRIPTORS: SHARP
DESCRIPTORS: SHARP

## 2019-09-09 ASSESSMENT — PAIN DESCRIPTION - ONSET
ONSET: ON-GOING
ONSET: ON-GOING

## 2019-09-09 ASSESSMENT — PAIN - FUNCTIONAL ASSESSMENT: PAIN_FUNCTIONAL_ASSESSMENT: ACTIVITIES ARE NOT PREVENTED

## 2019-09-09 ASSESSMENT — PAIN DESCRIPTION - ORIENTATION: ORIENTATION: LEFT

## 2019-09-10 VITALS
HEIGHT: 69 IN | TEMPERATURE: 98.4 F | DIASTOLIC BLOOD PRESSURE: 83 MMHG | BODY MASS INDEX: 23.87 KG/M2 | SYSTOLIC BLOOD PRESSURE: 132 MMHG | OXYGEN SATURATION: 94 % | RESPIRATION RATE: 18 BRPM | HEART RATE: 73 BPM | WEIGHT: 161.13 LBS

## 2019-09-10 LAB
ABSOLUTE EOS #: 0 K/UL (ref 0–0.4)
ABSOLUTE IMMATURE GRANULOCYTE: 0.57 K/UL (ref 0–0.3)
ABSOLUTE LYMPH #: 4 K/UL (ref 1–4.8)
ABSOLUTE MONO #: 1.72 K/UL (ref 0.1–0.8)
ALBUMIN SERPL-MCNC: 2.8 G/DL (ref 3.5–5.2)
ALBUMIN/GLOBULIN RATIO: 0.7 (ref 1–2.5)
ALP BLD-CCNC: 60 U/L (ref 40–129)
ALT SERPL-CCNC: 14 U/L (ref 5–41)
ANCA MYELOPEROXIDASE: 8 AU/ML
ANCA PROTEINASE 3: 11 AU/ML
ANION GAP SERPL CALCULATED.3IONS-SCNC: 13 MMOL/L (ref 9–17)
AST SERPL-CCNC: 14 U/L
BASOPHILS # BLD: 0 % (ref 0–2)
BASOPHILS ABSOLUTE: 0 K/UL (ref 0–0.2)
BILIRUB SERPL-MCNC: <0.1 MG/DL (ref 0.3–1.2)
BUN BLDV-MCNC: 5 MG/DL (ref 6–20)
BUN/CREAT BLD: ABNORMAL (ref 9–20)
CALCIUM IONIZED: 1.17 MMOL/L (ref 1.13–1.33)
CALCIUM SERPL-MCNC: 8.7 MG/DL (ref 8.6–10.4)
CHLORIDE BLD-SCNC: 101 MMOL/L (ref 98–107)
CO2: 22 MMOL/L (ref 20–31)
CREAT SERPL-MCNC: 0.63 MG/DL (ref 0.7–1.2)
DIFFERENTIAL TYPE: ABNORMAL
EOSINOPHILS RELATIVE PERCENT: 0 % (ref 1–4)
GFR AFRICAN AMERICAN: >60 ML/MIN
GFR NON-AFRICAN AMERICAN: >60 ML/MIN
GFR SERPL CREATININE-BSD FRML MDRD: ABNORMAL ML/MIN/{1.73_M2}
GFR SERPL CREATININE-BSD FRML MDRD: ABNORMAL ML/MIN/{1.73_M2}
GLUCOSE BLD-MCNC: 110 MG/DL (ref 70–99)
HCT VFR BLD CALC: 37.3 % (ref 40.7–50.3)
HEMOGLOBIN: 12.2 G/DL (ref 13–17)
HIV AG/AB: NONREACTIVE
IMMATURE GRANULOCYTES: 4 %
INTERVENTION: NORMAL
LYMPHOCYTES # BLD: 28 % (ref 24–44)
MAGNESIUM: 1.8 MG/DL (ref 1.6–2.6)
MCH RBC QN AUTO: 29.9 PG (ref 25.2–33.5)
MCHC RBC AUTO-ENTMCNC: 32.7 G/DL (ref 28.4–34.8)
MCV RBC AUTO: 91.4 FL (ref 82.6–102.9)
MONOCYTES # BLD: 12 % (ref 1–7)
MORPHOLOGY: NORMAL
NRBC AUTOMATED: 0.3 PER 100 WBC
PDW BLD-RTO: 14.3 % (ref 11.8–14.4)
PLATELET # BLD: 348 K/UL (ref 138–453)
PLATELET ESTIMATE: ABNORMAL
PMV BLD AUTO: 9.6 FL (ref 8.1–13.5)
POTASSIUM SERPL-SCNC: 3.5 MMOL/L (ref 3.7–5.3)
RBC # BLD: 4.08 M/UL (ref 4.21–5.77)
RBC # BLD: ABNORMAL 10*6/UL
SEG NEUTROPHILS: 56 % (ref 36–66)
SEGMENTED NEUTROPHILS ABSOLUTE COUNT: 8.01 K/UL (ref 1.8–7.7)
SODIUM BLD-SCNC: 136 MMOL/L (ref 135–144)
TOTAL PROTEIN: 6.9 G/DL (ref 6.4–8.3)
WBC # BLD: 14.3 K/UL (ref 3.5–11.3)
WBC # BLD: ABNORMAL 10*3/UL

## 2019-09-10 PROCEDURE — G0009 ADMIN PNEUMOCOCCAL VACCINE: HCPCS | Performed by: FAMILY MEDICINE

## 2019-09-10 PROCEDURE — 99232 SBSQ HOSP IP/OBS MODERATE 35: CPT | Performed by: INTERNAL MEDICINE

## 2019-09-10 PROCEDURE — 6360000002 HC RX W HCPCS: Performed by: STUDENT IN AN ORGANIZED HEALTH CARE EDUCATION/TRAINING PROGRAM

## 2019-09-10 PROCEDURE — 82330 ASSAY OF CALCIUM: CPT

## 2019-09-10 PROCEDURE — 6370000000 HC RX 637 (ALT 250 FOR IP): Performed by: INTERNAL MEDICINE

## 2019-09-10 PROCEDURE — 6370000000 HC RX 637 (ALT 250 FOR IP): Performed by: STUDENT IN AN ORGANIZED HEALTH CARE EDUCATION/TRAINING PROGRAM

## 2019-09-10 PROCEDURE — G0008 ADMIN INFLUENZA VIRUS VAC: HCPCS | Performed by: FAMILY MEDICINE

## 2019-09-10 PROCEDURE — 36415 COLL VENOUS BLD VENIPUNCTURE: CPT

## 2019-09-10 PROCEDURE — 83735 ASSAY OF MAGNESIUM: CPT

## 2019-09-10 PROCEDURE — 6360000002 HC RX W HCPCS: Performed by: FAMILY MEDICINE

## 2019-09-10 PROCEDURE — 80053 COMPREHEN METABOLIC PANEL: CPT

## 2019-09-10 PROCEDURE — 85025 COMPLETE CBC W/AUTO DIFF WBC: CPT

## 2019-09-10 PROCEDURE — 90670 PCV13 VACCINE IM: CPT | Performed by: FAMILY MEDICINE

## 2019-09-10 PROCEDURE — 6360000002 HC RX W HCPCS: Performed by: INTERNAL MEDICINE

## 2019-09-10 PROCEDURE — 90686 IIV4 VACC NO PRSV 0.5 ML IM: CPT | Performed by: FAMILY MEDICINE

## 2019-09-10 PROCEDURE — 99239 HOSP IP/OBS DSCHRG MGMT >30: CPT | Performed by: FAMILY MEDICINE

## 2019-09-10 RX ORDER — ONDANSETRON 2 MG/ML
4 INJECTION INTRAMUSCULAR; INTRAVENOUS ONCE
Status: COMPLETED | OUTPATIENT
Start: 2019-09-10 | End: 2019-09-10

## 2019-09-10 RX ORDER — GUAIFENESIN 600 MG/1
600 TABLET, EXTENDED RELEASE ORAL 2 TIMES DAILY
Qty: 30 TABLET | Refills: 0 | Status: SHIPPED | OUTPATIENT
Start: 2019-09-10 | End: 2020-10-21

## 2019-09-10 RX ORDER — LEVOFLOXACIN 750 MG/1
750 TABLET ORAL DAILY
Qty: 10 TABLET | Refills: 0 | Status: SHIPPED | OUTPATIENT
Start: 2019-09-11 | End: 2019-09-21

## 2019-09-10 RX ADMIN — ALPRAZOLAM 0.25 MG: 0.25 TABLET ORAL at 05:25

## 2019-09-10 RX ADMIN — TRAMADOL HYDROCHLORIDE 50 MG: 50 TABLET, FILM COATED ORAL at 09:35

## 2019-09-10 RX ADMIN — INFLUENZA A VIRUS A/MICHIGAN/45/2015 X-275 (H1N1) ANTIGEN (FORMALDEHYDE INACTIVATED), INFLUENZA A VIRUS A/SINGAPORE/INFIMH-16-0019/2016 IVR-186 (H3N2) ANTIGEN (FORMALDEHYDE INACTIVATED), INFLUENZA B VIRUS B/PHUKET/3073/2013 ANTIGEN (FORMALDEHYDE INACTIVATED), AND INFLUENZA B VIRUS B/MARYLAND/15/2016 BX-69A ANTIGEN (FORMALDEHYDE INACTIVATED) 0.5 ML: 15; 15; 15; 15 INJECTION, SUSPENSION INTRAMUSCULAR at 10:47

## 2019-09-10 RX ADMIN — PNEUMOCOCCAL 13-VALENT CONJUGATE VACCINE 0.5 ML: 2.2; 2.2; 2.2; 2.2; 2.2; 4.4; 2.2; 2.2; 2.2; 2.2; 2.2; 2.2; 2.2 INJECTION, SUSPENSION INTRAMUSCULAR at 10:47

## 2019-09-10 RX ADMIN — ENOXAPARIN SODIUM 40 MG: 40 INJECTION SUBCUTANEOUS at 09:27

## 2019-09-10 RX ADMIN — LEVOFLOXACIN 750 MG: 750 TABLET, FILM COATED ORAL at 09:27

## 2019-09-10 RX ADMIN — GUAIFENESIN 600 MG: 600 TABLET, EXTENDED RELEASE ORAL at 09:27

## 2019-09-10 RX ADMIN — ONDANSETRON 4 MG: 2 INJECTION INTRAMUSCULAR; INTRAVENOUS at 13:26

## 2019-09-10 RX ADMIN — TRAMADOL HYDROCHLORIDE 50 MG: 50 TABLET, FILM COATED ORAL at 02:32

## 2019-09-10 ASSESSMENT — PAIN DESCRIPTION - PAIN TYPE
TYPE: ACUTE PAIN
TYPE: ACUTE PAIN

## 2019-09-10 ASSESSMENT — PAIN SCALES - GENERAL
PAINLEVEL_OUTOF10: 7
PAINLEVEL_OUTOF10: 8

## 2019-09-10 ASSESSMENT — PAIN DESCRIPTION - LOCATION: LOCATION: BACK;CHEST

## 2019-09-11 LAB
CULTURE: NORMAL
CULTURE: NORMAL
Lab: NORMAL
Lab: NORMAL
SPECIMEN DESCRIPTION: NORMAL
SPECIMEN DESCRIPTION: NORMAL

## 2019-09-15 LAB
EKG ATRIAL RATE: 83 BPM
EKG P AXIS: 63 DEGREES
EKG P-R INTERVAL: 136 MS
EKG Q-T INTERVAL: 392 MS
EKG QRS DURATION: 94 MS
EKG QTC CALCULATION (BAZETT): 460 MS
EKG R AXIS: 77 DEGREES
EKG T AXIS: 61 DEGREES
EKG VENTRICULAR RATE: 83 BPM

## 2019-10-07 LAB
CULTURE: NORMAL
Lab: NORMAL
SPECIMEN DESCRIPTION: NORMAL

## 2020-03-31 ENCOUNTER — HOSPITAL ENCOUNTER (EMERGENCY)
Age: 40
Discharge: HOME | End: 2020-03-31
Attending: EMERGENCY MEDICINE
Payer: MEDICAID

## 2020-03-31 ENCOUNTER — APPOINTMENT (OUTPATIENT)
Dept: GENERAL RADIOLOGY | Age: 40
End: 2020-03-31
Payer: MEDICAID

## 2020-03-31 VITALS
BODY MASS INDEX: 25.18 KG/M2 | TEMPERATURE: 98.3 F | RESPIRATION RATE: 16 BRPM | WEIGHT: 170 LBS | OXYGEN SATURATION: 98 % | HEIGHT: 69 IN | DIASTOLIC BLOOD PRESSURE: 107 MMHG | HEART RATE: 76 BPM | SYSTOLIC BLOOD PRESSURE: 139 MMHG

## 2020-03-31 LAB
ANION GAP SERPL CALCULATED.3IONS-SCNC: 9 MMOL/L (ref 9–17)
BILIRUBIN URINE: NEGATIVE
BUN BLDV-MCNC: 15 MG/DL (ref 6–20)
BUN/CREAT BLD: ABNORMAL (ref 9–20)
CALCIUM SERPL-MCNC: 9.1 MG/DL (ref 8.6–10.4)
CHLORIDE BLD-SCNC: 104 MMOL/L (ref 98–107)
CO2: 22 MMOL/L (ref 20–31)
COLOR: YELLOW
COMMENT UA: NORMAL
CREAT SERPL-MCNC: 0.63 MG/DL (ref 0.7–1.2)
GFR AFRICAN AMERICAN: >60 ML/MIN
GFR NON-AFRICAN AMERICAN: >60 ML/MIN
GFR SERPL CREATININE-BSD FRML MDRD: ABNORMAL ML/MIN/{1.73_M2}
GFR SERPL CREATININE-BSD FRML MDRD: ABNORMAL ML/MIN/{1.73_M2}
GLUCOSE BLD-MCNC: 91 MG/DL (ref 70–99)
GLUCOSE URINE: NEGATIVE
HCT VFR BLD CALC: 41.6 % (ref 40.7–50.3)
HEMOGLOBIN: 14.1 G/DL (ref 13–17)
KETONES, URINE: NEGATIVE
LEUKOCYTE ESTERASE, URINE: NEGATIVE
MCH RBC QN AUTO: 32.2 PG (ref 25.2–33.5)
MCHC RBC AUTO-ENTMCNC: 33.9 G/DL (ref 28.4–34.8)
MCV RBC AUTO: 95 FL (ref 82.6–102.9)
NITRITE, URINE: NEGATIVE
NRBC AUTOMATED: 0 PER 100 WBC
PDW BLD-RTO: 14.4 % (ref 11.8–14.4)
PH UA: 5 (ref 5–8)
PLATELET # BLD: 340 K/UL (ref 138–453)
PMV BLD AUTO: 9.3 FL (ref 8.1–13.5)
POTASSIUM SERPL-SCNC: 4.7 MMOL/L (ref 3.7–5.3)
PROTEIN UA: NEGATIVE
RBC # BLD: 4.38 M/UL (ref 4.21–5.77)
SODIUM BLD-SCNC: 135 MMOL/L (ref 135–144)
SPECIFIC GRAVITY UA: 1.02 (ref 1–1.03)
TURBIDITY: CLEAR
URINE HGB: NEGATIVE
UROBILINOGEN, URINE: NORMAL
WBC # BLD: 8 K/UL (ref 3.5–11.3)

## 2020-03-31 PROCEDURE — 93005 ELECTROCARDIOGRAM TRACING: CPT | Performed by: EMERGENCY MEDICINE

## 2020-03-31 PROCEDURE — 6370000000 HC RX 637 (ALT 250 FOR IP): Performed by: STUDENT IN AN ORGANIZED HEALTH CARE EDUCATION/TRAINING PROGRAM

## 2020-03-31 PROCEDURE — 80048 BASIC METABOLIC PNL TOTAL CA: CPT

## 2020-03-31 PROCEDURE — 85027 COMPLETE CBC AUTOMATED: CPT

## 2020-03-31 PROCEDURE — 81003 URINALYSIS AUTO W/O SCOPE: CPT

## 2020-03-31 PROCEDURE — 71045 X-RAY EXAM CHEST 1 VIEW: CPT

## 2020-03-31 PROCEDURE — 99284 EMERGENCY DEPT VISIT MOD MDM: CPT

## 2020-03-31 RX ORDER — IBUPROFEN 800 MG/1
800 TABLET ORAL ONCE
Status: COMPLETED | OUTPATIENT
Start: 2020-03-31 | End: 2020-03-31

## 2020-03-31 RX ADMIN — IBUPROFEN 800 MG: 800 TABLET, FILM COATED ORAL at 13:36

## 2020-03-31 ASSESSMENT — PAIN SCALES - GENERAL
PAINLEVEL_OUTOF10: 1
PAINLEVEL_OUTOF10: 1

## 2020-03-31 ASSESSMENT — PAIN DESCRIPTION - PAIN TYPE: TYPE: ACUTE PAIN

## 2020-03-31 ASSESSMENT — PAIN DESCRIPTION - ORIENTATION: ORIENTATION: RIGHT;LEFT

## 2020-03-31 ASSESSMENT — PAIN DESCRIPTION - LOCATION: LOCATION: FLANK

## 2020-03-31 ASSESSMENT — PAIN DESCRIPTION - DESCRIPTORS: DESCRIPTORS: DISCOMFORT

## 2020-03-31 NOTE — ED PROVIDER NOTES
[address] [facility]     Emergency Department     Faculty Attestation    I performed a history and physical examination of the patient and discussed management with the resident. I reviewed the residents note and agree with the documented findings and plan of care. Any areas of disagreement are noted on the chart. I was personally present for the key portions of any procedures. I have documented in the chart those procedures where I was not present during the key portions. I have reviewed the emergency nurses triage note. I agree with the chief complaint, past medical history, past surgical history, allergies, medications, social and family history as documented unless otherwise noted below. For Physician Assistant/ Nurse Practitioner cases/documentation I have personally evaluated this patient and have completed at least one if not all key elements of the E/M (history, physical exam, and MDM). Additional findings are as noted. Primary Care Physician:  No primary care provider on file. CHIEF COMPLAINT       Chief Complaint   Patient presents with    Shortness of Breath     SOB with fatigue and dry cough x 4-5 days. Afebrile.  Denies chest pain, dizziness, headache, no vomiting    Flank Pain     bilat flank pain, no history of kidney stones, denies dysuria/hematuria       RECENT VITALS:   Temp: 98.3 °F (36.8 °C),  Pulse: 96, Resp: 19, BP: (!) 139/107    LABS:  Labs Reviewed   CBC   BASIC METABOLIC PANEL   URINE RT REFLEX TO CULTURE         PERTINENT ATTENDING PHYSICIAN COMMENTS:    Patient is here with 2 complaints one is of a dry cough and low-grade fever for the last 3 4days the other is for left flank pain he does have a history of kidney problems and had kidney failure last year there is been no gross hematuria no history of stones exam he is afebrile nontoxic  Chest x-ray will also check urinalysis and kidney function  Critical Care          Danielle Khalil MD, [facility]  Attending Emergency  Physician              Himanshu Felipe MD  03/31/20 8988

## 2020-03-31 NOTE — ED PROVIDER NOTES
file    Years of education: Not on file    Highest education level: Not on file   Occupational History    Not on file   Social Needs    Financial resource strain: Not on file    Food insecurity     Worry: Not on file     Inability: Not on file    Transportation needs     Medical: Not on file     Non-medical: Not on file   Tobacco Use    Smoking status: Heavy Tobacco Smoker     Packs/day: 1.00     Years: 15.00     Pack years: 15.00     Types: Cigarettes    Smokeless tobacco: Never Used   Substance and Sexual Activity    Alcohol use: Yes     Comment: pt states he has not drand since december 6/30/16    Drug use: Yes     Types: Opiates , Marijuana, IV     Comment: last used 4 days ago    Sexual activity: Yes     Partners: Male   Lifestyle    Physical activity     Days per week: Not on file     Minutes per session: Not on file    Stress: Not on file   Relationships    Social connections     Talks on phone: Not on file     Gets together: Not on file     Attends Orthodoxy service: Not on file     Active member of club or organization: Not on file     Attends meetings of clubs or organizations: Not on file     Relationship status: Not on file    Intimate partner violence     Fear of current or ex partner: Not on file     Emotionally abused: Not on file     Physically abused: Not on file     Forced sexual activity: Not on file   Other Topics Concern    Not on file   Social History Narrative    ** Merged History Encounter **            Family History   Problem Relation Age of Onset    Cancer Mother        Allergies:  Patient has no known allergies. Home Medications:  Prior to Admission medications    Medication Sig Start Date End Date Taking? Authorizing Provider   albuterol (PROVENTIL HFA) 108 (90 BASE) MCG/ACT inhaler Inhale 1-2 puffs into the lungs every 4 hours as needed for Wheezing or Shortness of Breath (Space out to every 6 hours as symptoms improve).  Space out to every 6 hours as symptoms improve. 4/11/15  Yes MICHAELA PardoaiFENesin [facility]) 600 MG extended release tablet Take 1 tablet by mouth 2 times daily 9/10/19   Tash Miranda MD   ibuprofen (IBU) 400 MG tablet Take 1 tablet by mouth every 6 hours as needed for Pain 7/5/18   Kimberly Schafer MD       REVIEW OF SYSTEMS    (2-9 systems for level 4, 10 or more for level 5)      General ROS - No fevers, positive chills, no gradual weight loss, no night sweats  Ophthalmic ROS - No discharge, No changes in vision  ENT ROS -  No sore throat, No rhinorrhea,   Respiratory ROS - no shortness of breath, positive cough, no  wheezing  Cardiovascular ROS - No chest pain, no dyspnea on exertion  Gastrointestinal ROS - No abdominal pain, no nausea, no vomiting, no change in bowel habits, positive black or bloody stools  Genito-Urinary ROS - No dysuria, trouble voiding, or hematuria  Musculoskeletal ROS - positive myalgias, No arthalgias  Neurological ROS - No headache, no dizziness/lightheadedness, No focal weakness, no loss of sensation  Dermatological ROS - No lesions, No rash         PHYSICAL EXAM   (up to 7 for level 4, 8 or more for level 5)      INITIAL VITALS:   BP (!) 139/107   Pulse 76   Temp 98.3 °F (36.8 °C) (Oral)   Resp 16   Ht 5' 9\" (1.753 m)   Wt 170 lb (77.1 kg)   SpO2 98%   BMI 25.10 kg/m²     General Appearance: Well-appearing, in no acute distress  HEENT: Head: normocephalic/atraumatic eyes: PERRLA, EOMT, conjunctiva not injected, sclerae nonicteric ears: External canals patent nose: Nares patent, no rhinorrhea, throat:mucous membranes moist, oropharynx clear     Neck: Trachea midline, no JVD. Lungs: No evidence of increased work of breathing. CTA B/L, no wheezes/rhonchi     Cardiovascular: RRR, no murmur, 2+ peripheral pulses bilaterally. Cap refill less than 2 seconds. No lower extremity edema noted    Abdomen: Soft, nontender. No guarding or rebound tenderness.      Neurologic: [place]  to person, place, time, and event. No sensation deficits. Moving all extremities    Extremities: Skin warm, dry and intact.     Rectal: Rectal exam shows no fissures no external hemorrhoids or internal hemorrhoids palpated, no gross blood Hemoccult negative    DIFFERENTIAL  DIAGNOSIS     PLAN (LABS / IMAGING / EKG):  Orders Placed This Encounter   Procedures    XR CHEST PORTABLE    CBC    Basic Metabolic Panel    Urinalysis Reflex to Culture    EKG 12 Lead       MEDICATIONS ORDERED:  Orders Placed This Encounter   Medications    ibuprofen (ADVIL;MOTRIN) tablet 800 mg           DIAGNOSTIC RESULTS / EMERGENCY DEPARTMENT COURSE / MDM     LABS:  Results for orders placed or performed during the hospital encounter of 03/31/20   CBC   Result Value Ref Range    WBC 8.0 3.5 - 11.3 k/uL    RBC 4.38 4.21 - 5.77 m/uL    Hemoglobin 14.1 13.0 - 17.0 g/dL    Hematocrit 41.6 40.7 - 50.3 %    MCV 95.0 82.6 - 102.9 fL    MCH 32.2 25.2 - 33.5 pg    MCHC 33.9 28.4 - 34.8 g/dL    RDW 14.4 11.8 - 14.4 %    Platelets 403 695 - 037 k/uL    MPV 9.3 8.1 - 13.5 fL    NRBC Automated 0.0 0.0 per 100 WBC   Basic Metabolic Panel   Result Value Ref Range    Glucose 91 70 - 99 mg/dL    BUN 15 6 - 20 mg/dL    CREATININE 0.63 (L) 0.70 - 1.20 mg/dL    Bun/Cre Ratio NOT REPORTED 9 - 20    Calcium 9.1 8.6 - 10.4 mg/dL    Sodium 135 135 - 144 mmol/L    Potassium 4.7 3.7 - 5.3 mmol/L    Chloride 104 98 - 107 mmol/L    CO2 22 20 - 31 mmol/L    Anion Gap 9 9 - 17 mmol/L    GFR Non-African American >60 >60 mL/min    GFR African American >60 >60 mL/min    GFR Comment          GFR Staging NOT REPORTED    Urinalysis Reflex to Culture   Result Value Ref Range    Color, UA YELLOW YELLOW    Turbidity UA CLEAR CLEAR    Glucose, Ur NEGATIVE NEGATIVE    Bilirubin Urine NEGATIVE NEGATIVE    Ketones, Urine NEGATIVE NEGATIVE    Specific Gravity, UA 1.017 1.005 - 1.030    Urine Hgb NEGATIVE NEGATIVE    pH, UA 5.0 5.0 - 8.0    Protein, UA NEGATIVE NEGATIVE

## 2020-04-01 ENCOUNTER — CARE COORDINATION (OUTPATIENT)
Dept: CARE COORDINATION | Age: 40
End: 2020-04-01

## 2020-04-01 LAB
EKG ATRIAL RATE: 88 BPM
EKG P AXIS: 60 DEGREES
EKG P-R INTERVAL: 138 MS
EKG Q-T INTERVAL: 366 MS
EKG QRS DURATION: 96 MS
EKG QTC CALCULATION (BAZETT): 442 MS
EKG R AXIS: 58 DEGREES
EKG T AXIS: 50 DEGREES
EKG VENTRICULAR RATE: 88 BPM

## 2020-04-01 PROCEDURE — 93010 ELECTROCARDIOGRAM REPORT: CPT | Performed by: INTERNAL MEDICINE

## 2020-04-02 ENCOUNTER — CARE COORDINATION (OUTPATIENT)
Dept: CARE COORDINATION | Age: 40
End: 2020-04-02

## 2020-04-15 ENCOUNTER — CARE COORDINATION (OUTPATIENT)
Dept: CARE COORDINATION | Age: 40
End: 2020-04-15

## 2020-10-21 ENCOUNTER — OFFICE VISIT (OUTPATIENT)
Dept: FAMILY MEDICINE CLINIC | Age: 40
End: 2020-10-21
Payer: MEDICAID

## 2020-10-21 ENCOUNTER — HOSPITAL ENCOUNTER (OUTPATIENT)
Dept: GENERAL RADIOLOGY | Age: 40
Discharge: HOME | End: 2020-10-23
Payer: MEDICAID

## 2020-10-21 ENCOUNTER — HOSPITAL ENCOUNTER (OUTPATIENT)
Age: 40
Discharge: HOME | End: 2020-10-23
Payer: MEDICAID

## 2020-10-21 VITALS
DIASTOLIC BLOOD PRESSURE: 80 MMHG | BODY MASS INDEX: 28.58 KG/M2 | HEIGHT: 69 IN | OXYGEN SATURATION: 98 % | SYSTOLIC BLOOD PRESSURE: 122 MMHG | TEMPERATURE: 98.3 F | WEIGHT: 193 LBS | HEART RATE: 89 BPM

## 2020-10-21 PROBLEM — G47.00 INSOMNIA: Status: ACTIVE | Noted: 2020-10-21

## 2020-10-21 PROBLEM — S83.512A SPRAIN OF ANTERIOR CRUCIATE LIGAMENT OF LEFT KNEE: Status: RESOLVED | Noted: 2017-07-12 | Resolved: 2020-10-21

## 2020-10-21 PROBLEM — S83.289A ACUTE TEAR LATERAL MENISCUS: Status: RESOLVED | Noted: 2017-07-12 | Resolved: 2020-10-21

## 2020-10-21 PROBLEM — Z98.890 H/O FASCIOTOMY: Status: ACTIVE | Noted: 2020-07-11

## 2020-10-21 PROBLEM — S82.202A LEFT TIBIAL FRACTURE: Status: RESOLVED | Noted: 2020-07-10 | Resolved: 2020-10-21

## 2020-10-21 PROBLEM — E55.9 VITAMIN D DEFICIENCY: Status: ACTIVE | Noted: 2020-07-12

## 2020-10-21 PROBLEM — S82.202A LEFT TIBIAL FRACTURE: Status: ACTIVE | Noted: 2020-07-10

## 2020-10-21 PROBLEM — I10 HYPERTENSION: Status: ACTIVE | Noted: 2020-10-21

## 2020-10-21 PROBLEM — S82.402A CLOSED FRACTURE OF LEFT FIBULA AND TIBIA: Status: ACTIVE | Noted: 2020-10-21

## 2020-10-21 PROBLEM — F14.10 COCAINE ABUSE (HCC): Status: ACTIVE | Noted: 2018-06-22

## 2020-10-21 PROBLEM — J18.9 PNEUMONIA OF BOTH LUNGS DUE TO INFECTIOUS ORGANISM: Status: RESOLVED | Noted: 2019-09-05 | Resolved: 2020-10-21

## 2020-10-21 PROBLEM — S82.202A CLOSED FRACTURE OF LEFT FIBULA AND TIBIA: Status: ACTIVE | Noted: 2020-10-21

## 2020-10-21 PROBLEM — G93.41 METABOLIC ENCEPHALOPATHY: Status: RESOLVED | Noted: 2019-09-06 | Resolved: 2020-10-21

## 2020-10-21 PROBLEM — R26.9 GAIT DIFFICULTY: Status: ACTIVE | Noted: 2020-07-11

## 2020-10-21 PROBLEM — S06.9XAA TRAUMATIC BRAIN INJURY: Status: ACTIVE | Noted: 2020-07-11

## 2020-10-21 PROBLEM — S82.202A CLOSED FRACTURE OF SHAFT OF LEFT TIBIA: Status: RESOLVED | Noted: 2020-06-25 | Resolved: 2020-10-21

## 2020-10-21 PROBLEM — S82.202A CLOSED FRACTURE OF SHAFT OF LEFT TIBIA: Status: ACTIVE | Noted: 2020-06-25

## 2020-10-21 PROBLEM — F33.2 MAJOR DEPRESSIVE DISORDER, RECURRENT EPISODE, SEVERE (HCC): Status: ACTIVE | Noted: 2020-10-21

## 2020-10-21 PROBLEM — M62.82 RHABDOMYOLYSIS: Status: RESOLVED | Noted: 2019-09-07 | Resolved: 2020-10-21

## 2020-10-21 PROBLEM — F11.90 CHRONIC, CONTINUOUS USE OF OPIOIDS: Status: ACTIVE | Noted: 2020-10-21

## 2020-10-21 PROCEDURE — 73590 X-RAY EXAM OF LOWER LEG: CPT

## 2020-10-21 PROCEDURE — 99406 BEHAV CHNG SMOKING 3-10 MIN: CPT | Performed by: FAMILY MEDICINE

## 2020-10-21 PROCEDURE — 90688 IIV4 VACCINE SPLT 0.5 ML IM: CPT | Performed by: FAMILY MEDICINE

## 2020-10-21 PROCEDURE — 90471 IMMUNIZATION ADMIN: CPT | Performed by: FAMILY MEDICINE

## 2020-10-21 PROCEDURE — 99204 OFFICE O/P NEW MOD 45 MIN: CPT | Performed by: FAMILY MEDICINE

## 2020-10-21 RX ORDER — OXYCODONE HYDROCHLORIDE 5 MG/1
TABLET ORAL
COMMUNITY
Start: 2020-09-17 | End: 2020-10-21

## 2020-10-21 RX ORDER — TRAZODONE HYDROCHLORIDE 50 MG/1
1 TABLET ORAL
COMMUNITY
End: 2020-10-21

## 2020-10-21 RX ORDER — OXYCODONE HYDROCHLORIDE 15 MG/1
TABLET ORAL
COMMUNITY
Start: 2020-09-25 | End: 2022-09-21

## 2020-10-21 RX ORDER — DOXYCYCLINE HYCLATE 50 MG/1
CAPSULE ORAL
COMMUNITY
Start: 2020-10-13 | End: 2020-10-21

## 2020-10-21 RX ORDER — DIAZEPAM 5 MG/1
TABLET ORAL
COMMUNITY
Start: 2020-07-23 | End: 2020-10-21

## 2020-10-21 RX ORDER — BUSPIRONE HYDROCHLORIDE 5 MG/1
TABLET ORAL
COMMUNITY
End: 2020-10-21

## 2020-10-21 RX ORDER — APIXABAN 5 MG/1
TABLET, FILM COATED ORAL
COMMUNITY
Start: 2020-07-30 | End: 2020-10-21

## 2020-10-21 RX ORDER — GABAPENTIN 800 MG/1
TABLET ORAL
COMMUNITY
Start: 2020-09-23

## 2020-10-21 RX ORDER — FAMOTIDINE 20 MG/1
TABLET, FILM COATED ORAL
COMMUNITY
Start: 2020-07-23 | End: 2020-10-21

## 2020-10-21 RX ORDER — PSEUDOEPHED/ACETAMINOPH/DIPHEN 30MG-500MG
TABLET ORAL
COMMUNITY
Start: 2020-07-23 | End: 2022-09-21

## 2020-10-21 RX ORDER — POLYETHYLENE GLYCOL 3350 17 G/17G
POWDER, FOR SOLUTION ORAL
COMMUNITY
Start: 2020-07-23 | End: 2020-10-21

## 2020-10-21 RX ORDER — NALOXONE HYDROCHLORIDE 4 MG/.1ML
SPRAY NASAL
COMMUNITY
Start: 2020-07-23

## 2020-10-21 RX ORDER — TRAZODONE HYDROCHLORIDE 50 MG/1
50 TABLET ORAL NIGHTLY
Qty: 30 TABLET | Refills: 2 | Status: SHIPPED | OUTPATIENT
Start: 2020-10-21 | End: 2020-11-20 | Stop reason: SDUPTHER

## 2020-10-21 SDOH — ECONOMIC STABILITY: TRANSPORTATION INSECURITY
IN THE PAST 12 MONTHS, HAS THE LACK OF TRANSPORTATION KEPT YOU FROM MEDICAL APPOINTMENTS OR FROM GETTING MEDICATIONS?: NO

## 2020-10-21 SDOH — ECONOMIC STABILITY: INCOME INSECURITY: HOW HARD IS IT FOR YOU TO PAY FOR THE VERY BASICS LIKE FOOD, HOUSING, MEDICAL CARE, AND HEATING?: VERY HARD

## 2020-10-21 SDOH — ECONOMIC STABILITY: TRANSPORTATION INSECURITY
IN THE PAST 12 MONTHS, HAS LACK OF TRANSPORTATION KEPT YOU FROM MEETINGS, WORK, OR FROM GETTING THINGS NEEDED FOR DAILY LIVING?: NO

## 2020-10-21 SDOH — ECONOMIC STABILITY: FOOD INSECURITY: WITHIN THE PAST 12 MONTHS, YOU WORRIED THAT YOUR FOOD WOULD RUN OUT BEFORE YOU GOT MONEY TO BUY MORE.: NEVER TRUE

## 2020-10-21 SDOH — ECONOMIC STABILITY: FOOD INSECURITY: WITHIN THE PAST 12 MONTHS, THE FOOD YOU BOUGHT JUST DIDN'T LAST AND YOU DIDN'T HAVE MONEY TO GET MORE.: NEVER TRUE

## 2020-10-21 ASSESSMENT — ENCOUNTER SYMPTOMS
ALLERGIC/IMMUNOLOGIC NEGATIVE: 1
SWOLLEN GLANDS: 0
NAUSEA: 0
TROUBLE SWALLOWING: 0
WHEEZING: 1
BLURRED VISION: 0
HEARTBURN: 0
ABDOMINAL PAIN: 0
SPUTUM PRODUCTION: 0
SORE THROAT: 0
RHINORRHEA: 0
DIFFICULTY BREATHING: 0
ORTHOPNEA: 0
CHEST TIGHTNESS: 0
EYES NEGATIVE: 1
HEMOPTYSIS: 0
GASTROINTESTINAL NEGATIVE: 1
VISUAL CHANGE: 0
COUGH: 0
CHANGE IN BOWEL HABIT: 0
VOMITING: 0
HOARSE VOICE: 0
SHORTNESS OF BREATH: 0
FREQUENT THROAT CLEARING: 0
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
## 2020-10-21 NOTE — PATIENT INSTRUCTIONS
Patient Education        Epley Maneuver for Vertigo: Exercises  Introduction  The Epley maneuver is a series of movements your doctor may use to treat your vertigo. Here are the steps for the exercises. Your doctor or physical therapist will guide you through the movements. A single 10- to 15-minute session often is all that's needed. Crystal debris (canaliths) cause the vertigo. When your head is moved into different positions, the debris moves freely. This may cause your symptoms to stop. How to do the exercises  Step 1   1. You will sit on the doctor's exam table. Your legs will be out in front of you. The doctor or physical therapist will turn your head so that it is shelter between looking straight ahead and looking to the side that causes the worst vertigo. 2. Without changing your head position, he or she will guide you back quickly. Your shoulders will be on the table. Your head will hang over the edge of the table. At this point, the side of your head that is causing the worst vertigo will face the floor. You'll stay in this position for 30 seconds or until your symptoms stop. Step 2   1. Then, the doctor or physical therapist will turn your head to the other side. You don't need to lift your head. The other side of your head will face the floor. You will stay in this position for 30 seconds or until your symptoms stop. Step 3   1. The doctor or physical therapist will help you roll your body in the same direction that your head is facing. You will lie on your side. (For example, if you are looking to your right, you will roll onto your right side.) The side that causes the worst symptoms should be facing up. You'll stay in this position for another 30 seconds or until your symptoms stop. Step 4   1. The doctor or physical therapist will then help you to sit back up. Your legs will hang off the table on the same side that you were facing.     Follow-up care is a key part of your treatment and

## 2020-10-21 NOTE — ASSESSMENT & PLAN NOTE
Controlled and monitored by his pain doctor  Discussed weaning and cessation - given option of going to [facility]

## 2020-11-20 ENCOUNTER — OFFICE VISIT (OUTPATIENT)
Dept: FAMILY MEDICINE CLINIC | Age: 40
End: 2020-11-20
Payer: MEDICAID

## 2020-11-20 VITALS
SYSTOLIC BLOOD PRESSURE: 140 MMHG | BODY MASS INDEX: 29.92 KG/M2 | DIASTOLIC BLOOD PRESSURE: 80 MMHG | TEMPERATURE: 97.2 F | HEIGHT: 69 IN | HEART RATE: 92 BPM | OXYGEN SATURATION: 97 % | WEIGHT: 202 LBS

## 2020-11-20 PROCEDURE — 99214 OFFICE O/P EST MOD 30 MIN: CPT | Performed by: FAMILY MEDICINE

## 2020-11-20 PROCEDURE — 99406 BEHAV CHNG SMOKING 3-10 MIN: CPT | Performed by: FAMILY MEDICINE

## 2020-11-20 RX ORDER — CEPHALEXIN 500 MG/1
500 CAPSULE ORAL 4 TIMES DAILY
COMMUNITY
End: 2022-09-21

## 2020-11-20 RX ORDER — SULFAMETHOXAZOLE AND TRIMETHOPRIM 400; 80 MG/1; MG/1
1 TABLET ORAL 2 TIMES DAILY
COMMUNITY
End: 2022-09-21

## 2020-11-20 RX ORDER — VARENICLINE TARTRATE
KIT
Qty: 1 BOX | Refills: 0 | Status: SHIPPED | OUTPATIENT
Start: 2020-11-20 | End: 2020-12-17 | Stop reason: SDUPTHER

## 2020-11-20 RX ORDER — TRAZODONE HYDROCHLORIDE 100 MG/1
100 TABLET ORAL NIGHTLY
Qty: 30 TABLET | Refills: 2 | Status: SHIPPED | OUTPATIENT
Start: 2020-11-20 | End: 2021-01-11

## 2020-11-20 ASSESSMENT — ENCOUNTER SYMPTOMS
SORE THROAT: 0
VOMITING: 0
RESPIRATORY NEGATIVE: 1
ABDOMINAL PAIN: 0
ALLERGIC/IMMUNOLOGIC NEGATIVE: 1
NAUSEA: 0
GASTROINTESTINAL NEGATIVE: 1
SWOLLEN GLANDS: 0
EYES NEGATIVE: 1
CHANGE IN BOWEL HABIT: 0
COUGH: 0
VISUAL CHANGE: 0

## 2020-11-20 ASSESSMENT — PATIENT HEALTH QUESTIONNAIRE - PHQ9
SUM OF ALL RESPONSES TO PHQ QUESTIONS 1-9: 0
1. LITTLE INTEREST OR PLEASURE IN DOING THINGS: 0
SUM OF ALL RESPONSES TO PHQ9 QUESTIONS 1 & 2: 0
SUM OF ALL RESPONSES TO PHQ QUESTIONS 1-9: 0
SUM OF ALL RESPONSES TO PHQ QUESTIONS 1-9: 0
2. FEELING DOWN, DEPRESSED OR HOPELESS: 0
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
## 2020-11-20 NOTE — PATIENT INSTRUCTIONS
Patient Education        Epley Maneuver at Home for Vertigo: Exercises  Introduction  Vertigo is a spinning or whirling sensation when you move your head. Your doctor may have moved you in different positions to help your vertigo get better faster. This is called the Epley maneuver. Your doctor also may have asked you to do these exercises at home. Do the exercises as often as your doctor recommends. If your vertigo is getting worse, your doctor may have you change the exercise or stop it. Step 1  Step 1   1. Sit on the edge of a bed or sofa. Step 2   1. Turn your head 45 degrees in the direction your doctor told you to. This should be toward the ear that causes the most vertigo for you. In this picture, the woman is turning toward her left ear. Step 3   1. Tilt yourself backward until you are lying on your back. Your head should still be at a 45-degree turn. Your head should be about midway between looking straight ahead and looking out to your side. Hold for 30 seconds. If you have vertigo, stay in this position until it stops. Step 4   1. Turn your head 90 degrees toward the ear that has the least vertigo. In this picture, the woman is turning to the right because she has vertigo on her left side. The point of your chin should be raised and over your shoulder. Hold for 30 seconds. Step 5   1. Roll onto the side with the least vertigo. You should now be looking at the floor. Hold for 30 seconds. Follow-up care is a key part of your treatment and safety. Be sure to make and go to all appointments, and call your doctor if you are having problems. It's also a good idea to know your test results and keep a list of the medicines you take. Where can you learn more? Go to https://chcteb.[email or web address]. org and sign in to your [employer] account. Enter I284 in the [employer] box to learn more about \"Epley Maneuver at Home for Vertigo: Exercises. \"     If you do not have an account, please click on the \"Sign Up Now\" link. Current as of: November 20, 2019               Content Version: 12.6  © 4782-9640 [employer]. Care instructions adapted under license by [facility] ([facility]). If you have questions about a medical condition or this instruction, always ask your healthcare professional. [address] any warranty or liability for your use of this information.

## 2020-11-20 NOTE — ASSESSMENT & PLAN NOTE
Uncontrolled  Increase Trazodone to 150mg total nightly  Encouraged to send mychart message on effectiveness in a couple weeks

## 2020-11-20 NOTE — ASSESSMENT & PLAN NOTE
· reasonably well controlled  · compliant with meds as per medication list.  · Continue current treatment regimen. · Continue current medications. · Dietary sodium restriction. · Weight loss  · Stop smoking  · Discussed the role of hypertension in development of other disease courses such as CHF and atherosclerosis. · Encouraged patient to avoid sodium in the diet and reminded that the majority of sodium comes from packaged foods in cans and boxes which should be avoided where possible. · Encouraged good hydration and avoidance of caffeine where possible. · Discussed goals for blood pressure monitoring which should be 130/80.

## 2020-11-20 NOTE — ASSESSMENT & PLAN NOTE
Patient is still in pain but will be decreased soon on his opioid medication  Discussed hyperalgesia

## 2020-12-02 ENCOUNTER — HOSPITAL ENCOUNTER (OUTPATIENT)
Age: 40
Discharge: HOME | End: 2020-12-04
Payer: MEDICAID

## 2020-12-02 ENCOUNTER — HOSPITAL ENCOUNTER (OUTPATIENT)
Dept: GENERAL RADIOLOGY | Age: 40
Discharge: HOME | End: 2020-12-04
Payer: MEDICAID

## 2020-12-02 PROCEDURE — 73590 X-RAY EXAM OF LOWER LEG: CPT

## 2020-12-17 RX ORDER — VARENICLINE TARTRATE
KIT
Qty: 1 BOX | Refills: 0 | Status: SHIPPED | OUTPATIENT
Start: 2020-12-17 | End: 2021-05-13

## 2020-12-17 NOTE — TELEPHONE ENCOUNTER
Patient is requesting a refill on the medication chosen from the flow chart. States he is almost out. Patient would also like to let Dr. Malu Pagan know that the sleeping medication is not helping him at all. He is not falling asleep until 7-8am when he takes the medication around 9:30-10pm. Would like to know what he should do regarding this please.      LOV: 11/20/2020   NOV: 02/23/21    [facility] on [address]

## 2021-01-07 ENCOUNTER — HOSPITAL ENCOUNTER (OUTPATIENT)
Dept: CT IMAGING | Age: 41
Discharge: HOME | End: 2021-01-09
Payer: MEDICAID

## 2021-01-07 DIAGNOSIS — S82.252K CLOSED DISP COMMINUTED FRACTURE OF SHAFT OF LEFT TIBIA WITH NONUNION: ICD-10-CM

## 2021-01-07 DIAGNOSIS — S82.202E FRACTURE OF BOTH TIBIA BONES, OPEN TYPE I OR II, WITH ROUTINE HEALING, SUBSEQUENT ENCOUNTER: ICD-10-CM

## 2021-01-07 DIAGNOSIS — S82.402E: ICD-10-CM

## 2021-01-07 DIAGNOSIS — S82.201E FRACTURE OF BOTH TIBIA BONES, OPEN TYPE I OR II, WITH ROUTINE HEALING, SUBSEQUENT ENCOUNTER: ICD-10-CM

## 2021-01-07 DIAGNOSIS — S82.401E: ICD-10-CM

## 2021-01-07 PROCEDURE — 73700 CT LOWER EXTREMITY W/O DYE: CPT

## 2021-01-11 DIAGNOSIS — G47.00 INSOMNIA, UNSPECIFIED TYPE: ICD-10-CM

## 2021-01-11 RX ORDER — TRAZODONE HYDROCHLORIDE 50 MG/1
50 TABLET ORAL NIGHTLY
Qty: 30 TABLET | Refills: 2 | Status: SHIPPED | OUTPATIENT
Start: 2021-01-11 | End: 2021-02-23

## 2021-01-11 NOTE — TELEPHONE ENCOUNTER
Juan Robles is calling to request a refill on the following medication(s):    Medication Request:  Requested Prescriptions     Pending Prescriptions Disp Refills    traZODone (DESYREL) 50 MG tablet [Pharmacy Med Name: TRAZODONE 50 MG TABLET] 30 tablet 2     Sig: take 1 tablet by mouth nightly       Last Visit Date (If Applicable):  76/17/4695    Next Visit Date:    2/23/2021        Last Refill:  10/21/2020

## 2021-02-02 ENCOUNTER — HOSPITAL ENCOUNTER (OUTPATIENT)
Age: 41
Discharge: HOME | End: 2021-02-04
Payer: MEDICAID

## 2021-02-02 ENCOUNTER — HOSPITAL ENCOUNTER (OUTPATIENT)
Dept: GENERAL RADIOLOGY | Age: 41
Discharge: HOME | End: 2021-02-04
Payer: MEDICAID

## 2021-02-02 DIAGNOSIS — S82.262D CLOSED DISPLACED SEGMENTAL FRACTURE OF SHAFT OF LEFT TIBIA WITH ROUTINE HEALING, SUBSEQUENT ENCOUNTER: ICD-10-CM

## 2021-02-02 PROCEDURE — 73590 X-RAY EXAM OF LOWER LEG: CPT

## 2021-02-23 ENCOUNTER — OFFICE VISIT (OUTPATIENT)
Dept: FAMILY MEDICINE CLINIC | Age: 41
End: 2021-02-23
Payer: MEDICAID

## 2021-02-23 VITALS
DIASTOLIC BLOOD PRESSURE: 70 MMHG | BODY MASS INDEX: 29.82 KG/M2 | HEART RATE: 74 BPM | SYSTOLIC BLOOD PRESSURE: 130 MMHG | OXYGEN SATURATION: 97 % | TEMPERATURE: 97.2 F | HEIGHT: 69 IN

## 2021-02-23 DIAGNOSIS — I10 ESSENTIAL HYPERTENSION: Primary | ICD-10-CM

## 2021-02-23 DIAGNOSIS — G47.00 INSOMNIA, UNSPECIFIED TYPE: ICD-10-CM

## 2021-02-23 DIAGNOSIS — F41.0 PANIC ANXIETY SYNDROME: ICD-10-CM

## 2021-02-23 DIAGNOSIS — F33.2 SEVERE EPISODE OF RECURRENT MAJOR DEPRESSIVE DISORDER, WITHOUT PSYCHOTIC FEATURES (HCC): ICD-10-CM

## 2021-02-23 DIAGNOSIS — K58.0 IRRITABLE BOWEL SYNDROME WITH DIARRHEA: ICD-10-CM

## 2021-02-23 DIAGNOSIS — H81.10 BENIGN PAROXYSMAL POSITIONAL VERTIGO, UNSPECIFIED LATERALITY: ICD-10-CM

## 2021-02-23 DIAGNOSIS — J45.20 MILD INTERMITTENT ASTHMA WITHOUT COMPLICATION: ICD-10-CM

## 2021-02-23 DIAGNOSIS — F17.200 CURRENT EVERY DAY SMOKER: ICD-10-CM

## 2021-02-23 PROBLEM — S82.262H: Status: ACTIVE | Noted: 2021-01-14

## 2021-02-23 PROCEDURE — 4004F PT TOBACCO SCREEN RCVD TLK: CPT | Performed by: FAMILY MEDICINE

## 2021-02-23 PROCEDURE — G8482 FLU IMMUNIZE ORDER/ADMIN: HCPCS | Performed by: FAMILY MEDICINE

## 2021-02-23 PROCEDURE — G8419 CALC BMI OUT NRM PARAM NOF/U: HCPCS | Performed by: FAMILY MEDICINE

## 2021-02-23 PROCEDURE — 99214 OFFICE O/P EST MOD 30 MIN: CPT | Performed by: FAMILY MEDICINE

## 2021-02-23 PROCEDURE — G8427 DOCREV CUR MEDS BY ELIG CLIN: HCPCS | Performed by: FAMILY MEDICINE

## 2021-02-23 RX ORDER — ALBUTEROL SULFATE 90 UG/1
1-2 AEROSOL, METERED RESPIRATORY (INHALATION) EVERY 4 HOURS PRN
Qty: 1 INHALER | Refills: 2 | Status: SHIPPED | OUTPATIENT
Start: 2021-02-23 | End: 2021-05-17

## 2021-02-23 ASSESSMENT — ENCOUNTER SYMPTOMS
HEMOPTYSIS: 0
SPUTUM PRODUCTION: 0
HEARTBURN: 0
WHEEZING: 1
CHEST TIGHTNESS: 0
CHANGE IN BOWEL HABIT: 0
SWOLLEN GLANDS: 0
GASTROINTESTINAL NEGATIVE: 1
HOARSE VOICE: 0
VISUAL CHANGE: 0
FREQUENT THROAT CLEARING: 0
NAUSEA: 0
EYES NEGATIVE: 1
COUGH: 0
DIFFICULTY BREATHING: 0
ALLERGIC/IMMUNOLOGIC NEGATIVE: 1
SHORTNESS OF BREATH: 0
RHINORRHEA: 0
TROUBLE SWALLOWING: 0
SORE THROAT: 0
BLURRED VISION: 0
VOMITING: 0
ORTHOPNEA: 0
ABDOMINAL PAIN: 0

## 2021-02-23 ASSESSMENT — PATIENT HEALTH QUESTIONNAIRE - PHQ9
SUM OF ALL RESPONSES TO PHQ QUESTIONS 1-9: 0
1. LITTLE INTEREST OR PLEASURE IN DOING THINGS: 0
SUM OF ALL RESPONSES TO PHQ QUESTIONS 1-9: 0
SUM OF ALL RESPONSES TO PHQ9 QUESTIONS 1 & 2: 0

## 2021-02-23 NOTE — PATIENT INSTRUCTIONS
Patient Education        Learning About Sleeping Well  What does sleeping well mean? Sleeping well means getting enough sleep. How much sleep is enough varies among people. The number of hours you sleep is not as important as how you feel when you wake up. If you do not feel refreshed, you probably need more sleep. Another sign of not getting enough sleep is feeling tired during the day. The average total nightly sleep time is 7½ to 8 hours. Healthy adults may need a little more or a little less than this. Why is getting enough sleep important? Getting enough quality sleep is a basic part of good health. When your sleep suffers, your mood and your thoughts can suffer too. You may find yourself feeling more grumpy or stressed. Not getting enough sleep also can lead to serious problems, including injury, accidents, anxiety, and depression. What might cause poor sleeping? Many things can cause sleep problems, including:  · Stress. Stress can be caused by fear about a single event, such as giving a speech. Or you may have ongoing stress, such as worry about work or school. · Depression, anxiety, and other mental or emotional conditions. · Changes in your sleep habits or surroundings. This includes changes that happen where you sleep, such as noise, light, or sleeping in a different bed. It also includes changes in your sleep pattern, such as having jet lag or working a late shift. · Health problems, such as pain, breathing problems, and restless legs syndrome. · Lack of regular exercise. How can you help yourself? Here are some tips that may help you sleep more soundly and wake up feeling more refreshed. Your sleeping area   · Use your bedroom only for sleeping and sex. A bit of light reading may help you fall asleep. But if it doesn't, do your reading elsewhere in the house. Don't watch TV in bed. · Be sure your bed is big enough to stretch out comfortably, especially if you have a sleep partner. · Keep your bedroom quiet, dark, and cool. Use curtains, blinds, or a sleep mask to block out light. To block out noise, use earplugs, soothing music, or a \"white noise\" machine. Your evening and bedtime routine   · Create a relaxing bedtime routine. You might want to take a warm shower or bath, listen to soothing music, or drink a cup of noncaffeinated tea. · Go to bed at the same time every night. And get up at the same time every morning, even if you feel tired. What to avoid   · Limit caffeine (coffee, tea, caffeinated sodas) during the day, and don't have any for at least 4 to 6 hours before bedtime. · Don't drink alcohol before bedtime. Alcohol can cause you to wake up more often during the night. · Don't smoke or use tobacco, especially in the evening. Nicotine can keep you awake. · Don't take naps during the day, especially close to bedtime. · Don't lie in bed awake for too long. If you can't fall asleep, or if you wake up in the middle of the night and can't get back to sleep within 15 minutes or so, get out of bed and go to another room until you feel sleepy. · Don't take medicine right before bed that may keep you awake or make you feel hyper or energized. Your doctor can tell you if your medicine may do this and if you can take it earlier in the day. If you can't sleep   · Imagine yourself in a peaceful, pleasant scene. Focus on the details and feelings of being in a place that is relaxing. · Get up and do a quiet or boring activity until you feel sleepy. · Don't drink any liquids after 6 p.m. if you wake up often because you have to go to the bathroom. Where can you learn more? Go to [email or web address] and sign in to your [employer] account. Enter R692 in the [employer] box to learn more about \"Learning About Sleeping Well. \"     If you do not have an account, please click on the \"Sign Up Now\" link.
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
## 2021-02-25 ENCOUNTER — TELEPHONE (OUTPATIENT)
Dept: FAMILY MEDICINE CLINIC | Age: 41
End: 2021-02-25

## 2021-02-26 ENCOUNTER — HOSPITAL ENCOUNTER (OUTPATIENT)
Age: 41
Discharge: HOME | End: 2021-02-28
Payer: MEDICAID

## 2021-02-26 ENCOUNTER — HOSPITAL ENCOUNTER (OUTPATIENT)
Dept: GENERAL RADIOLOGY | Age: 41
Discharge: HOME | End: 2021-02-28
Payer: MEDICAID

## 2021-02-26 DIAGNOSIS — S82.262G CLOSED DISPLACED SEGMENTAL FRACTURE OF SHAFT OF LEFT TIBIA WITH DELAYED HEALING, SUBSEQUENT ENCOUNTER: ICD-10-CM

## 2021-02-26 PROCEDURE — 73590 X-RAY EXAM OF LOWER LEG: CPT

## 2021-03-02 ENCOUNTER — TELEPHONE (OUTPATIENT)
Dept: FAMILY MEDICINE CLINIC | Age: 41
End: 2021-03-02

## 2021-03-02 DIAGNOSIS — G47.00 INSOMNIA, UNSPECIFIED TYPE: Primary | ICD-10-CM

## 2021-03-02 NOTE — TELEPHONE ENCOUNTER
Advised pt that it was denied twice by insurance ; pt wants to know if anything else can be sent in? He can not afford to pay for anything his insurance wont cover.

## 2021-03-02 NOTE — TELEPHONE ENCOUNTER
Patient is very very upset that he doesn't have his prescription yet for Suvorexant   He called his pharmacy and they told him they are refaxing the PA request again. Patient states \"this is bullsh*t\"  Can you let me know where we are on this? Thanks!

## 2021-03-03 RX ORDER — DOXEPIN HYDROCHLORIDE 6 MG/1
6 TABLET ORAL NIGHTLY PRN
Qty: 30 TABLET | Refills: 0 | Status: SHIPPED | OUTPATIENT
Start: 2021-03-03 | End: 2021-10-28

## 2021-04-06 ENCOUNTER — HOSPITAL ENCOUNTER (OUTPATIENT)
Dept: GENERAL RADIOLOGY | Age: 41
Discharge: HOME | End: 2021-04-08
Payer: MEDICAID

## 2021-04-06 ENCOUNTER — HOSPITAL ENCOUNTER (OUTPATIENT)
Age: 41
Discharge: HOME | End: 2021-04-08
Payer: MEDICAID

## 2021-04-06 DIAGNOSIS — S82.262H: ICD-10-CM

## 2021-04-06 PROCEDURE — 73590 X-RAY EXAM OF LOWER LEG: CPT

## 2021-04-20 ENCOUNTER — HOSPITAL ENCOUNTER (OUTPATIENT)
Dept: GENERAL RADIOLOGY | Age: 41
Discharge: HOME | End: 2021-04-22
Payer: MEDICAID

## 2021-04-20 ENCOUNTER — HOSPITAL ENCOUNTER (OUTPATIENT)
Age: 41
Discharge: HOME | End: 2021-04-22
Payer: MEDICAID

## 2021-04-20 DIAGNOSIS — S82.402S CLOSED FRACTURE OF LEFT FIBULA AND TIBIA, SEQUELA: ICD-10-CM

## 2021-04-20 DIAGNOSIS — S82.202S CLOSED FRACTURE OF LEFT FIBULA AND TIBIA, SEQUELA: ICD-10-CM

## 2021-04-20 PROCEDURE — 73590 X-RAY EXAM OF LOWER LEG: CPT

## 2021-05-04 ENCOUNTER — TELEPHONE (OUTPATIENT)
Dept: FAMILY MEDICINE CLINIC | Age: 41
End: 2021-05-04

## 2021-05-04 DIAGNOSIS — I10 ESSENTIAL HYPERTENSION: Primary | ICD-10-CM

## 2021-05-04 RX ORDER — BLOOD PRESSURE TEST KIT
KIT MISCELLANEOUS
Qty: 1 KIT | Refills: 0 | Status: SHIPPED | OUTPATIENT
Start: 2021-05-04

## 2021-05-04 NOTE — TELEPHONE ENCOUNTER
Patient saw [facility] \"something\" about ADHD yesterday. He was told they could not prescribe any medication because his blood pressure was too high. It was 178/108 and when retaken it was 168/106. He says he has not had any metoprolol for over a month.   He has to go back to [facility] in 2 weeks, but his blood pressure has to go down before they will prescribe his ADHD med

## 2021-05-04 NOTE — TELEPHONE ENCOUNTER
Unfortunately the records do not indicate what his previous sig was. Can you please Call [facility] and ask what the previous sig was? Normally Metoprolol 25mg is taken twice daily if it is the immediate release tablet. However they make an extended release tablet that is taken once per day.

## 2021-05-04 NOTE — TELEPHONE ENCOUNTER
He needs to restart his Metoprolol (and prep refill if he is out of medication) and get a blood pressure monitor (if he doesn't already have one) and start checking his BP daily to make sure it goes down.  Call in one week and give daily readings

## 2021-05-10 ENCOUNTER — TELEPHONE (OUTPATIENT)
Dept: FAMILY MEDICINE CLINIC | Age: 41
End: 2021-05-10

## 2021-05-11 ENCOUNTER — HOSPITAL ENCOUNTER (OUTPATIENT)
Age: 41
Discharge: HOME | End: 2021-05-13
Payer: MEDICAID

## 2021-05-11 ENCOUNTER — HOSPITAL ENCOUNTER (OUTPATIENT)
Dept: GENERAL RADIOLOGY | Age: 41
Discharge: HOME | End: 2021-05-13
Payer: MEDICAID

## 2021-05-11 DIAGNOSIS — R52 PAIN: ICD-10-CM

## 2021-05-11 PROCEDURE — 73590 X-RAY EXAM OF LOWER LEG: CPT

## 2021-05-13 ENCOUNTER — HOSPITAL ENCOUNTER (OUTPATIENT)
Age: 41
Setting detail: SPECIMEN
Discharge: HOME | End: 2021-05-13
Payer: MEDICAID

## 2021-05-13 ENCOUNTER — OFFICE VISIT (OUTPATIENT)
Dept: FAMILY MEDICINE CLINIC | Age: 41
End: 2021-05-13
Payer: MEDICAID

## 2021-05-13 VITALS — OXYGEN SATURATION: 97 % | DIASTOLIC BLOOD PRESSURE: 66 MMHG | HEART RATE: 69 BPM | SYSTOLIC BLOOD PRESSURE: 130 MMHG

## 2021-05-13 DIAGNOSIS — S82.92XD CLOSED FRACTURE OF LEFT LOWER LEG WITH ROUTINE HEALING, SUBSEQUENT ENCOUNTER: ICD-10-CM

## 2021-05-13 DIAGNOSIS — J45.20 MILD INTERMITTENT ASTHMA WITHOUT COMPLICATION: ICD-10-CM

## 2021-05-13 DIAGNOSIS — G47.00 INSOMNIA, UNSPECIFIED TYPE: ICD-10-CM

## 2021-05-13 DIAGNOSIS — R68.82 LOW LIBIDO: ICD-10-CM

## 2021-05-13 DIAGNOSIS — F17.200 CURRENT EVERY DAY SMOKER: ICD-10-CM

## 2021-05-13 DIAGNOSIS — F41.0 PANIC ANXIETY SYNDROME: ICD-10-CM

## 2021-05-13 DIAGNOSIS — I10 ESSENTIAL HYPERTENSION: Primary | ICD-10-CM

## 2021-05-13 LAB — TESTOSTERONE TOTAL: 490 NG/DL (ref 220–1000)

## 2021-05-13 PROCEDURE — 99214 OFFICE O/P EST MOD 30 MIN: CPT | Performed by: FAMILY MEDICINE

## 2021-05-13 PROCEDURE — G8419 CALC BMI OUT NRM PARAM NOF/U: HCPCS | Performed by: FAMILY MEDICINE

## 2021-05-13 PROCEDURE — G8427 DOCREV CUR MEDS BY ELIG CLIN: HCPCS | Performed by: FAMILY MEDICINE

## 2021-05-13 PROCEDURE — 4004F PT TOBACCO SCREEN RCVD TLK: CPT | Performed by: FAMILY MEDICINE

## 2021-05-13 RX ORDER — IBUPROFEN 800 MG/1
800 TABLET ORAL 3 TIMES DAILY
COMMUNITY
Start: 2021-04-12 | End: 2022-09-21

## 2021-05-13 RX ORDER — LISINOPRIL 10 MG/1
10 TABLET ORAL DAILY
Qty: 30 TABLET | Refills: 5 | Status: SHIPPED | OUTPATIENT
Start: 2021-05-13 | End: 2021-05-19 | Stop reason: SDUPTHER

## 2021-05-13 RX ORDER — HYDROCODONE BITARTRATE AND ACETAMINOPHEN 5; 325 MG/1; MG/1
1-2 TABLET ORAL EVERY 6 HOURS PRN
COMMUNITY
Start: 2021-05-15 | End: 2021-05-22

## 2021-05-13 RX ORDER — HYDROXYZINE PAMOATE 25 MG/1
25 CAPSULE ORAL DAILY
COMMUNITY
Start: 2021-05-03 | End: 2022-09-21

## 2021-05-13 RX ORDER — VARENICLINE TARTRATE
KIT
Qty: 1 BOX | Refills: 0 | Status: SHIPPED | OUTPATIENT
Start: 2021-05-13 | End: 2021-10-28

## 2021-05-13 ASSESSMENT — ENCOUNTER SYMPTOMS
BLURRED VISION: 0
HEMOPTYSIS: 0
ALLERGIC/IMMUNOLOGIC NEGATIVE: 1
DIFFICULTY BREATHING: 0
HOARSE VOICE: 0
CHEST TIGHTNESS: 0
SHORTNESS OF BREATH: 0
RHINORRHEA: 0
FREQUENT THROAT CLEARING: 0
ORTHOPNEA: 0
WHEEZING: 1
EYES NEGATIVE: 1
GASTROINTESTINAL NEGATIVE: 1
SPUTUM PRODUCTION: 0
TROUBLE SWALLOWING: 0
HEARTBURN: 0

## 2021-05-13 ASSESSMENT — PATIENT HEALTH QUESTIONNAIRE - PHQ9
2. FEELING DOWN, DEPRESSED OR HOPELESS: 0
SUM OF ALL RESPONSES TO PHQ QUESTIONS 1-9: 0
SUM OF ALL RESPONSES TO PHQ QUESTIONS 1-9: 0
SUM OF ALL RESPONSES TO PHQ9 QUESTIONS 1 & 2: 0

## 2021-05-13 NOTE — ASSESSMENT & PLAN NOTE
Monitored by specialist- no acute findings meriting change in the plan   Given Hydroxyzine for sleep but not working

## 2021-05-13 NOTE — PROGRESS NOTES
[facility] Progress Note    Date:5/13/2021         Patient Name:Timmy Drummond     YOB: 1980     Age:40 y.o. Assessment/Plan        Problem List Items Addressed This Visit        Circulatory    Hypertension - Primary      Uncontrolled, changes made today: add lisinopril, medication adherence emphasized and lifestyle modifications recommended   Monitor BP at home  Call next week with readings         Relevant Medications    lisinopril (PRINIVIL;ZESTRIL) 10 MG tablet       Respiratory    Mild intermittent asthma without complication      Well-controlled, continue current medications and medication adherence emphasized            Other    Current every day smoker      Restart Chantix  Ready to quit         Relevant Medications    varenicline (CHANTIX STARTING MONTH PAK) 0.5 MG X 11 & 1 MG X 42 tablet    Panic anxiety syndrome      Monitored by specialist- no acute findings meriting change in the plan   Given Hydroxyzine for sleep but not working         Relevant Medications    hydrOXYzine (VISTARIL) 25 MG capsule    Insomnia      Uncontrolled, multiple medications not covered by insurance or ineffective - advised to get options from insurance company and call me with options           Other Visit Diagnoses     Closed fracture of left lower leg with routine healing, subsequent encounter        Relevant Medications    HYDROcodone-acetaminophen (NORCO) 5-325 MG per tablet (Start on 5/15/2021)    ibuprofen (ADVIL;MOTRIN) 800 MG tablet    Low libido        Relevant Orders    Testosterone           Return in about 1 month (around 6/13/2021). Electronically signed by Efren Saenz DO on 5/13/21         Subjective     Marlee Saucedo is a 47 y.o. male presenting today for   Chief Complaint   Patient presents with    Blood Pressure Check   . Marlee Saucedo is a 47 y.o. male here for discussion regarding smoking cessation. He began smoking 21 years ago.  He currently smokes 1.5 packs per day.  He has attempted to quit smoking in the past, most recently several months ago. Best success quitting using willpower. Barriers to quitting include fear of failing again and feels under lots of stress just now. He denies constant cough. He is ready to quit b/c his partner is wanting him to. Has failed using patches - was doing really well but has started smoking again in last 2 weeks    Bertha Savage is a 47 y.o. male who complains of insomnia. Onset was several months ago. Patient describes symptoms as frequent night time awakening and difficulty falling asleep. Patient has found no relief with over the counter diphenhydramine and melatonin use. Associated symptoms include: anxiety, depression and stress. Patient denies daytime somnolence. Symptoms have progressed to a point and plateaued. Trazodone hasn't worked. He started taking 2 at once so he's been on 100mg. Has taken 200mg now and not working well    Was out of boot and off pain meds but tripped and has new hairline fracture in left lower leg - in boot again    Hypertension  This is a chronic problem. The current episode started more than 1 month ago. The problem has been gradually improving since onset. The problem is controlled. Associated symptoms include anxiety. Pertinent negatives include no blurred vision, malaise/fatigue, orthopnea, palpitations, peripheral edema, PND, shortness of breath or sweats. Past treatments include beta blockers. The current treatment provides significant improvement. Asthma  He complains of wheezing. There is no chest tightness, difficulty breathing, frequent throat clearing, hemoptysis, hoarse voice, shortness of breath or sputum production. This is a chronic problem. The current episode started more than 1 year ago. The problem occurs rarely. The problem has been gradually improving.  Pertinent negatives include no appetite change, dyspnea on exertion, ear congestion, ear pain, heartburn, malaise/fatigue, nasal congestion, orthopnea, PND, postnasal drip, rhinorrhea, sneezing, sweats, trouble swallowing or weight loss. His symptoms are alleviated by beta-agonist. He reports significant improvement on treatment. His past medical history is significant for asthma. Erectile Dysfunction  This is a chronic problem. The current episode started more than 1 month ago. The problem has been gradually worsening since onset. The nature of his difficulty is achieving erection, maintaining erection and penetration. Non-physiologic factors contributing to erectile dysfunction are a decreased libido. He reports no anxiety or performance anxiety. Irritative symptoms do not include frequency, nocturia or urgency. Obstructive symptoms do not include dribbling, incomplete emptying, an intermittent stream, a slower stream, straining or a weak stream. Pertinent negatives include no chills, dysuria, genital pain, hematuria, hesitancy or inability to urinate. Past treatments include nothing. The treatment provided no relief. Review of Systems   Review of Systems   Constitutional: Negative. Negative for appetite change, chills, malaise/fatigue and weight loss. HENT: Negative. Negative for ear pain, hoarse voice, postnasal drip, rhinorrhea, sneezing and trouble swallowing. Eyes: Negative. Negative for blurred vision. Respiratory: Positive for wheezing. Negative for hemoptysis, sputum production and shortness of breath. Cardiovascular: Negative. Negative for dyspnea on exertion, palpitations, orthopnea and PND. Gastrointestinal: Negative. Negative for heartburn. Endocrine: Negative. Genitourinary: Positive for decreased libido. Negative for dysuria, frequency, hematuria, hesitancy, incomplete emptying, nocturia and urgency. Musculoskeletal: Negative. Skin: Negative. Allergic/Immunologic: Negative. Neurological: Negative. Hematological: Negative. Psychiatric/Behavioral: Negative.   The patient is not exacerbation, Acute kidney injury ([address] ADD (attention deficit disorder with hyperactivity), Alcohol abuse, Anxiety, Anxiety and depression, Bipolar disorder ([address] Hypertension, and Hypertension. Social History:   reports that he has been smoking cigarettes. He has a 31.50 pack-year smoking history. He has never used smokeless tobacco. He reports current alcohol use. He reports previous drug use. Drug: Opiates . Family History:   Family History   Problem Relation Age of Onset    Cancer Mother        Surgical History:   Past Surgical History:   Procedure Laterality Date    ANTERIOR CRUCIATE LIGAMENT REPAIR      KNEE SURGERY      rt ACL repair 2011    KNEE SURGERY Right     MANDIBLE RECONSTRUCTION Bilateral 2004    MANDIBLE RECONSTRUCTION      SPLENECTOMY          Physical Examination      Vitals:  /66 (Site: Right Upper Arm, Position: Sitting, Cuff Size: Medium Adult)   Pulse 69   SpO2 97%     Physical Exam  Vitals signs and nursing note reviewed. Constitutional:       General: He is not in acute distress. Appearance: Normal appearance. He is obese. He is not ill-appearing, toxic-appearing or diaphoretic. HENT:      Head: Normocephalic and atraumatic. Right Ear: External ear normal.      Left Ear: External ear normal.      Nose: Nose normal.      Mouth/Throat:      Mouth: Mucous membranes are moist.      Pharynx: Oropharynx is clear. Eyes:      General: No scleral icterus. Right eye: No discharge. Left eye: No discharge. Extraocular Movements: Extraocular movements intact. Conjunctiva/sclera: Conjunctivae normal.      Pupils: Pupils are equal, round, and reactive to light. Neck:      Musculoskeletal: Normal range of motion and neck supple. No neck rigidity or muscular tenderness. Cardiovascular:      Rate and Rhythm: Normal rate and regular rhythm. Pulses: Normal pulses. Heart sounds: Normal heart sounds. No murmur. No friction rub.  No gallop. Pulmonary:      Effort: Pulmonary effort is normal. No respiratory distress. Breath sounds: Normal breath sounds. No stridor. No wheezing, rhonchi or rales. Chest:      Chest wall: No tenderness. Abdominal:      General: Bowel sounds are normal. There is no distension. Palpations: Abdomen is soft. There is no mass. Tenderness: There is no abdominal tenderness. There is no right CVA tenderness, left CVA tenderness, guarding or rebound. Hernia: No hernia is present. Musculoskeletal: Normal range of motion. General: No signs of injury. Left lower leg: He exhibits tenderness and deformity (in walking boot). He exhibits no bony tenderness, no swelling and no laceration. No edema. Skin:     General: Skin is warm. Capillary Refill: Capillary refill takes less than 2 seconds. Coloration: Skin is not jaundiced or pale. Findings: No bruising, erythema, lesion or rash. Neurological:      General: No focal deficit present. Mental Status: He is alert and oriented to person, place, and time. Mental status is at baseline. Motor: No weakness. Coordination: Coordination normal.      Gait: Gait abnormal (using crutches b/c can't put weight on left LE). Deep Tendon Reflexes: Reflexes normal.   Psychiatric:         Mood and Affect: Mood normal.         Behavior: Behavior normal.         Thought Content:  Thought content normal.         Judgment: Judgment normal.         Labs/Imaging/Diagnostics   Labs:  Hemoglobin A1C   Date Value Ref Range Status   09/13/2013 4.7 4.0 - 6.0 % Final       Imaging Last 24 Hours:  XR TIBIA FIBULA LEFT (2 VIEWS)  Narrative: EXAMINATION:  4 XRAY VIEWS OF THE LEFT TIBIA AND FIBULA    5/11/2021 2:26 pm    COMPARISON:  April 20, 2021    HISTORY:  ORDERING SYSTEM PROVIDED HISTORY: Pain  TECHNOLOGIST PROVIDED HISTORY:  Reason for Exam: fx x 1 year with a recent re-fx and 1 month  Acuity: Unknown  Type of Exam: Unknown    Follow-up. FINDINGS:  Again demonstrated are healing fractures involving the mid left tibial  diaphysis and mid left fibular diaphysis. Stable callus formation is noted  along the fracture margins, with fracture planes remaining visible. Overall, no progressive osseous bridging or progressive callus formation. Alignment of the healing fractures is stable from previous exam.    Joint alignment is maintained. No erosions are present. Impression: Stable left tibia/fibula radiographs compared to April 20, 2021.

## 2021-05-13 NOTE — PATIENT INSTRUCTIONS
Patient Education        DASH Diet: Care Instructions  Your Care Instructions     The DASH diet is an eating plan that can help lower your blood pressure. DASH stands for Dietary Approaches to Stop Hypertension. Hypertension is high blood pressure. The DASH diet focuses on eating foods that are high in calcium, potassium, and magnesium. These nutrients can lower blood pressure. The foods that are highest in these nutrients are fruits, vegetables, low-fat dairy products, nuts, seeds, and legumes. But taking calcium, potassium, and magnesium supplements instead of eating foods that are high in those nutrients does not have the same effect. The DASH diet also includes whole grains, fish, and poultry. The DASH diet is one of several lifestyle changes your doctor may recommend to lower your high blood pressure. Your doctor may also want you to decrease the amount of sodium in your diet. Lowering sodium while following the DASH diet can lower blood pressure even further than just the DASH diet alone. Follow-up care is a key part of your treatment and safety. Be sure to make and go to all appointments, and call your doctor if you are having problems. It's also a good idea to know your test results and keep a list of the medicines you take. How can you care for yourself at home? Following the DASH diet  · Eat 4 to 5 servings of fruit each day. A serving is 1 medium-sized piece of fruit, ½ cup chopped or canned fruit, 1/4 cup dried fruit, or 4 ounces (½ cup) of fruit juice. Choose fruit more often than fruit juice. · Eat 4 to 5 servings of vegetables each day. A serving is 1 cup of lettuce or raw leafy vegetables, ½ cup of chopped or cooked vegetables, or 4 ounces (½ cup) of vegetable juice. Choose vegetables more often than vegetable juice. · Get 2 to 3 servings of low-fat and fat-free dairy each day. A serving is 8 ounces of milk, 1 cup of yogurt, or 1 ½ ounces of cheese. · Eat 6 to 8 servings of grains each day.  A serving is 1 slice of bread, 1 ounce of dry cereal, or ½ cup of cooked rice, pasta, or cooked cereal. Try to choose whole-grain products as much as possible. · Limit lean meat, poultry, and fish to 2 servings each day. A serving is 3 ounces, about the size of a deck of cards. · Eat 4 to 5 servings of nuts, seeds, and legumes (cooked dried beans, lentils, and split peas) each week. A serving is 1/3 cup of nuts, 2 tablespoons of seeds, or ½ cup of cooked beans or peas. · Limit fats and oils to 2 to 3 servings each day. A serving is 1 teaspoon of vegetable oil or 2 tablespoons of salad dressing. · Limit sweets and added sugars to 5 servings or less a week. A serving is 1 tablespoon jelly or jam, ½ cup sorbet, or 1 cup of lemonade. · Eat less than 2,300 milligrams (mg) of sodium a day. If you limit your sodium to 1,500 mg a day, you can lower your blood pressure even more. · Be aware that all of these are the suggested number of servings for people who eat 1,800 to 2,000 calories a day. Your recommended number of servings may be different if you need more or fewer calories. Tips for success  · Start small. Do not try to make dramatic changes to your diet all at once. You might feel that you are missing out on your favorite foods and then be more likely to not follow the plan. Make small changes, and stick with them. Once those changes become habit, add a few more changes. · Try some of the following:  ? Make it a goal to eat a fruit or vegetable at every meal and at snacks. This will make it easy to get the recommended amount of fruits and vegetables each day. ? Try yogurt topped with fruit and nuts for a snack or healthy dessert. ? Add lettuce, tomato, cucumber, and onion to sandwiches. ? Combine a ready-made pizza crust with low-fat mozzarella cheese and lots of vegetable toppings. Try using tomatoes, squash, spinach, broccoli, carrots, cauliflower, and onions. ?  Have a variety of cut-up vegetables with a low-fat dip as an appetizer instead of chips and dip. ? Sprinkle sunflower seeds or chopped almonds over salads. Or try adding chopped walnuts or almonds to cooked vegetables. ? Try some vegetarian meals using beans and peas. Add garbanzo or kidney beans to salads. Make burritos and tacos with mashed ratliff beans or black beans. Where can you learn more? Go to https://[employer]. org and sign in to your [employer] account. Enter E236 in the Make YES! Happen box to learn more about \"DASH Diet: Care Instructions. \"     If you do not have an account, please click on the \"Sign Up Now\" link. Current as of: August 31, 2020               Content Version: 12.8  © 7100-6217 [employer]. Care instructions adapted under license by [facility] ([facility]). If you have questions about a medical condition or this instruction, always ask your healthcare professional. [address] any warranty or liability for your use of this information.

## 2021-05-17 DIAGNOSIS — J45.20 MILD INTERMITTENT ASTHMA WITHOUT COMPLICATION: ICD-10-CM

## 2021-05-17 NOTE — TELEPHONE ENCOUNTER
Abi Coronel is calling to request a refill on the following medication(s):    Medication Request:  Requested Prescriptions     Pending Prescriptions Disp Refills    albuterol sulfate  (90 Base) MCG/ACT inhaler [Pharmacy Med Name: ALBUTEROL HFA 90 MCG INHALER] 8.5 g 1     Sig: inhale 1 to 2 puffs INTO THE LUNGS every 4 hours if needed for wheezing shortness of breath every 6 hours       Last Visit Date (If Applicable):  4/69/6196    Next Visit Date:    6/10/2021

## 2021-05-18 RX ORDER — ALBUTEROL SULFATE 90 UG/1
AEROSOL, METERED RESPIRATORY (INHALATION)
Qty: 8.5 G | Refills: 1 | Status: SHIPPED | OUTPATIENT
Start: 2021-05-18 | End: 2021-10-28 | Stop reason: SDUPTHER

## 2021-05-19 ENCOUNTER — TELEPHONE (OUTPATIENT)
Dept: GASTROENTEROLOGY | Age: 41
End: 2021-05-19

## 2021-05-19 ENCOUNTER — TELEPHONE (OUTPATIENT)
Dept: FAMILY MEDICINE CLINIC | Age: 41
End: 2021-05-19

## 2021-05-19 DIAGNOSIS — I10 ESSENTIAL HYPERTENSION: ICD-10-CM

## 2021-05-19 RX ORDER — LISINOPRIL 10 MG/1
20 TABLET ORAL DAILY
Qty: 30 TABLET | Refills: 5
Start: 2021-05-19 | End: 2021-06-02

## 2021-05-19 NOTE — TELEPHONE ENCOUNTER
Patient called with BP readings since appt with you.      Friday       8:52am 153/93,  1:27pm 142/86  Saturday   11:26pm 128/76  Sunday     11:13am 120/77  Monday       9:16am 152/99, 8:53pm 147/86  Tues (today)  2:30pm  143/90

## 2021-05-19 NOTE — TELEPHONE ENCOUNTER
Pt was a No Show for NP appt on 05/19/21. Pt did not answer confirmation calls . Letter sent out to callback and reschedule and called no answer. Pt cancelled first appt back in March due to covid.

## 2021-05-19 NOTE — TELEPHONE ENCOUNTER
Bps are still too high.  I want Dom to increase his Lisinopril to 20mg daily (take two of the 10mg tablets together) and when he is close to being out of medication let us know and I will send in Lisinopril 20mg tablets and he can go back to one tablet daily

## 2021-06-01 DIAGNOSIS — I10 ESSENTIAL HYPERTENSION: ICD-10-CM

## 2021-06-02 ENCOUNTER — HOSPITAL ENCOUNTER (OUTPATIENT)
Age: 41
Setting detail: SPECIMEN
Discharge: HOME | End: 2021-06-02
Payer: MEDICAID

## 2021-06-02 LAB
ABSOLUTE EOS #: 0.35 K/UL (ref 0–0.44)
ABSOLUTE IMMATURE GRANULOCYTE: <0.03 K/UL (ref 0–0.3)
ABSOLUTE LYMPH #: 4.62 K/UL (ref 1.1–3.7)
ABSOLUTE MONO #: 0.76 K/UL (ref 0.1–1.2)
ALBUMIN SERPL-MCNC: 4.4 G/DL (ref 3.5–5.2)
ALBUMIN/GLOBULIN RATIO: 1.4 (ref 1–2.5)
ALP BLD-CCNC: 112 U/L (ref 40–129)
ALT SERPL-CCNC: 18 U/L (ref 5–41)
ANION GAP SERPL CALCULATED.3IONS-SCNC: 12 MMOL/L (ref 9–17)
AST SERPL-CCNC: 14 U/L
BASOPHILS # BLD: 1 % (ref 0–2)
BASOPHILS ABSOLUTE: 0.12 K/UL (ref 0–0.2)
BILIRUB SERPL-MCNC: 0.19 MG/DL (ref 0.3–1.2)
BUN BLDV-MCNC: 15 MG/DL (ref 6–20)
BUN/CREAT BLD: ABNORMAL (ref 9–20)
CALCIUM SERPL-MCNC: 9.3 MG/DL (ref 8.6–10.4)
CHLORIDE BLD-SCNC: 102 MMOL/L (ref 98–107)
CHOLESTEROL, FASTING: 138 MG/DL
CHOLESTEROL/HDL RATIO: 3.1
CO2: 21 MMOL/L (ref 20–31)
CREAT SERPL-MCNC: 0.57 MG/DL (ref 0.7–1.2)
DIFFERENTIAL TYPE: ABNORMAL
EOSINOPHILS RELATIVE PERCENT: 4 % (ref 1–4)
GFR AFRICAN AMERICAN: >60 ML/MIN
GFR NON-AFRICAN AMERICAN: >60 ML/MIN
GFR SERPL CREATININE-BSD FRML MDRD: ABNORMAL ML/MIN/{1.73_M2}
GFR SERPL CREATININE-BSD FRML MDRD: ABNORMAL ML/MIN/{1.73_M2}
GLUCOSE BLD-MCNC: 96 MG/DL (ref 70–99)
HCT VFR BLD CALC: 43 % (ref 40.7–50.3)
HDLC SERPL-MCNC: 44 MG/DL
HEMOGLOBIN: 14 G/DL (ref 13–17)
IMMATURE GRANULOCYTES: 0 %
LDL CHOLESTEROL: 62 MG/DL (ref 0–130)
LYMPHOCYTES # BLD: 55 % (ref 24–43)
MCH RBC QN AUTO: 30.4 PG (ref 25.2–33.5)
MCHC RBC AUTO-ENTMCNC: 32.6 G/DL (ref 28.4–34.8)
MCV RBC AUTO: 93.3 FL (ref 82.6–102.9)
MONOCYTES # BLD: 9 % (ref 3–12)
NRBC AUTOMATED: 0 PER 100 WBC
PDW BLD-RTO: 14.6 % (ref 11.8–14.4)
PLATELET # BLD: 462 K/UL (ref 138–453)
PLATELET ESTIMATE: ABNORMAL
PMV BLD AUTO: 9.6 FL (ref 8.1–13.5)
POTASSIUM SERPL-SCNC: 4.7 MMOL/L (ref 3.7–5.3)
RBC # BLD: 4.61 M/UL (ref 4.21–5.77)
RBC # BLD: ABNORMAL 10*6/UL
SEG NEUTROPHILS: 31 % (ref 36–65)
SEGMENTED NEUTROPHILS ABSOLUTE COUNT: 2.66 K/UL (ref 1.5–8.1)
SODIUM BLD-SCNC: 135 MMOL/L (ref 135–144)
THYROXINE, FREE: 1.34 NG/DL (ref 0.93–1.7)
TOTAL PROTEIN: 7.6 G/DL (ref 6.4–8.3)
TRIGLYCERIDE, FASTING: 161 MG/DL
TSH SERPL DL<=0.05 MIU/L-ACNC: 1.07 MIU/L (ref 0.3–5)
VITAMIN D 25-HYDROXY: 21 NG/ML (ref 30–100)
VLDLC SERPL CALC-MCNC: ABNORMAL MG/DL (ref 1–30)
WBC # BLD: 8.5 K/UL (ref 3.5–11.3)
WBC # BLD: ABNORMAL 10*3/UL

## 2021-06-02 RX ORDER — LISINOPRIL 20 MG/1
20 TABLET ORAL DAILY
Qty: 30 TABLET | Refills: 2 | Status: SHIPPED | OUTPATIENT
Start: 2021-06-02

## 2021-06-03 LAB
ESTIMATED AVERAGE GLUCOSE: 123 MG/DL
HBA1C MFR BLD: 5.9 % (ref 4–6)

## 2021-06-16 ENCOUNTER — TELEPHONE (OUTPATIENT)
Dept: GASTROENTEROLOGY | Age: 41
End: 2021-06-16

## 2021-06-22 ENCOUNTER — HOSPITAL ENCOUNTER (OUTPATIENT)
Age: 41
Discharge: HOME | End: 2021-06-24
Payer: MEDICAID

## 2021-06-22 ENCOUNTER — HOSPITAL ENCOUNTER (OUTPATIENT)
Dept: GENERAL RADIOLOGY | Age: 41
Discharge: HOME | End: 2021-06-24
Payer: MEDICAID

## 2021-06-22 DIAGNOSIS — S82.292D: ICD-10-CM

## 2021-06-22 PROCEDURE — 73590 X-RAY EXAM OF LOWER LEG: CPT

## 2021-06-29 DIAGNOSIS — I10 ESSENTIAL HYPERTENSION: ICD-10-CM

## 2021-06-29 NOTE — TELEPHONE ENCOUNTER
Britany Kaplan is calling to request a refill on the following medication(s):    Medication Request:  Requested Prescriptions     Pending Prescriptions Disp Refills    metoprolol tartrate (LOPRESSOR) 25 MG tablet [Pharmacy Med Name: METOPROLOL TARTRATE 25 MG TAB] 60 tablet 5     Sig: take 1 tablet by mouth twice a day       Last Visit Date (If Applicable):  8/80/5139    Next Visit Date:    Visit date not found

## 2021-07-28 ENCOUNTER — OFFICE VISIT (OUTPATIENT)
Dept: FAMILY MEDICINE CLINIC | Age: 41
End: 2021-07-28
Payer: MEDICAID

## 2021-07-28 VITALS
DIASTOLIC BLOOD PRESSURE: 68 MMHG | WEIGHT: 194.4 LBS | HEART RATE: 69 BPM | OXYGEN SATURATION: 98 % | SYSTOLIC BLOOD PRESSURE: 120 MMHG | BODY MASS INDEX: 28.69 KG/M2

## 2021-07-28 DIAGNOSIS — F17.200 CURRENT EVERY DAY SMOKER: ICD-10-CM

## 2021-07-28 DIAGNOSIS — Z87.81 HISTORY OF LOWER LEG FRACTURE: ICD-10-CM

## 2021-07-28 DIAGNOSIS — D23.5 DERMOID CYST OF SKIN OF BACK: ICD-10-CM

## 2021-07-28 DIAGNOSIS — I10 ESSENTIAL HYPERTENSION: Primary | ICD-10-CM

## 2021-07-28 DIAGNOSIS — J45.20 MILD INTERMITTENT ASTHMA WITHOUT COMPLICATION: ICD-10-CM

## 2021-07-28 PROBLEM — S82.262H: Status: RESOLVED | Noted: 2021-01-14 | Resolved: 2021-07-28

## 2021-07-28 PROCEDURE — G8427 DOCREV CUR MEDS BY ELIG CLIN: HCPCS | Performed by: FAMILY MEDICINE

## 2021-07-28 PROCEDURE — G8419 CALC BMI OUT NRM PARAM NOF/U: HCPCS | Performed by: FAMILY MEDICINE

## 2021-07-28 PROCEDURE — 99214 OFFICE O/P EST MOD 30 MIN: CPT | Performed by: FAMILY MEDICINE

## 2021-07-28 PROCEDURE — 4004F PT TOBACCO SCREEN RCVD TLK: CPT | Performed by: FAMILY MEDICINE

## 2021-07-28 RX ORDER — ONDANSETRON 4 MG/1
4 TABLET, FILM COATED ORAL EVERY 8 HOURS PRN
COMMUNITY
End: 2022-09-21

## 2021-07-28 ASSESSMENT — ENCOUNTER SYMPTOMS
SPUTUM PRODUCTION: 0
HEARTBURN: 0
WHEEZING: 1
DIFFICULTY BREATHING: 0
HEMOPTYSIS: 0
SHORTNESS OF BREATH: 0
EYES NEGATIVE: 1
TROUBLE SWALLOWING: 0
RHINORRHEA: 0
ORTHOPNEA: 0
FREQUENT THROAT CLEARING: 0
GASTROINTESTINAL NEGATIVE: 1
HOARSE VOICE: 0
ALLERGIC/IMMUNOLOGIC NEGATIVE: 1
BLURRED VISION: 0
CHEST TIGHTNESS: 0

## 2021-07-28 ASSESSMENT — PATIENT HEALTH QUESTIONNAIRE - PHQ9
SUM OF ALL RESPONSES TO PHQ QUESTIONS 1-9: 0
SUM OF ALL RESPONSES TO PHQ9 QUESTIONS 1 & 2: 0
2. FEELING DOWN, DEPRESSED OR HOPELESS: 0
SUM OF ALL RESPONSES TO PHQ QUESTIONS 1-9: 0
SUM OF ALL RESPONSES TO PHQ QUESTIONS 1-9: 0
1. LITTLE INTEREST OR PLEASURE IN DOING THINGS: 0
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
## 2021-07-28 NOTE — PATIENT INSTRUCTIONS
Patient Education        DASH Diet: Care Instructions  Your Care Instructions     The DASH diet is an eating plan that can help lower your blood pressure. DASH stands for Dietary Approaches to Stop Hypertension. Hypertension is high blood pressure. The DASH diet focuses on eating foods that are high in calcium, potassium, and magnesium. These nutrients can lower blood pressure. The foods that are highest in these nutrients are fruits, vegetables, low-fat dairy products, nuts, seeds, and legumes. But taking calcium, potassium, and magnesium supplements instead of eating foods that are high in those nutrients does not have the same effect. The DASH diet also includes whole grains, fish, and poultry. The DASH diet is one of several lifestyle changes your doctor may recommend to lower your high blood pressure. Your doctor may also want you to decrease the amount of sodium in your diet. Lowering sodium while following the DASH diet can lower blood pressure even further than just the DASH diet alone. Follow-up care is a key part of your treatment and safety. Be sure to make and go to all appointments, and call your doctor if you are having problems. It's also a good idea to know your test results and keep a list of the medicines you take. How can you care for yourself at home? Following the DASH diet  · Eat 4 to 5 servings of fruit each day. A serving is 1 medium-sized piece of fruit, ½ cup chopped or canned fruit, 1/4 cup dried fruit, or 4 ounces (½ cup) of fruit juice. Choose fruit more often than fruit juice. · Eat 4 to 5 servings of vegetables each day. A serving is 1 cup of lettuce or raw leafy vegetables, ½ cup of chopped or cooked vegetables, or 4 ounces (½ cup) of vegetable juice. Choose vegetables more often than vegetable juice. · Get 2 to 3 servings of low-fat and fat-free dairy each day. A serving is 8 ounces of milk, 1 cup of yogurt, or 1 ½ ounces of cheese. · Eat 6 to 8 servings of grains each day.  A serving is 1 slice of bread, 1 ounce of dry cereal, or ½ cup of cooked rice, pasta, or cooked cereal. Try to choose whole-grain products as much as possible. · Limit lean meat, poultry, and fish to 2 servings each day. A serving is 3 ounces, about the size of a deck of cards. · Eat 4 to 5 servings of nuts, seeds, and legumes (cooked dried beans, lentils, and split peas) each week. A serving is 1/3 cup of nuts, 2 tablespoons of seeds, or ½ cup of cooked beans or peas. · Limit fats and oils to 2 to 3 servings each day. A serving is 1 teaspoon of vegetable oil or 2 tablespoons of salad dressing. · Limit sweets and added sugars to 5 servings or less a week. A serving is 1 tablespoon jelly or jam, ½ cup sorbet, or 1 cup of lemonade. · Eat less than 2,300 milligrams (mg) of sodium a day. If you limit your sodium to 1,500 mg a day, you can lower your blood pressure even more. · Be aware that all of these are the suggested number of servings for people who eat 1,800 to 2,000 calories a day. Your recommended number of servings may be different if you need more or fewer calories. Tips for success  · Start small. Do not try to make dramatic changes to your diet all at once. You might feel that you are missing out on your favorite foods and then be more likely to not follow the plan. Make small changes, and stick with them. Once those changes become habit, add a few more changes. · Try some of the following:  ? Make it a goal to eat a fruit or vegetable at every meal and at snacks. This will make it easy to get the recommended amount of fruits and vegetables each day. ? Try yogurt topped with fruit and nuts for a snack or healthy dessert. ? Add lettuce, tomato, cucumber, and onion to sandwiches. ? Combine a ready-made pizza crust with low-fat mozzarella cheese and lots of vegetable toppings. Try using tomatoes, squash, spinach, broccoli, carrots, cauliflower, and onions. ?  Have a variety of cut-up vegetables with a low-fat dip as an appetizer instead of chips and dip. ? Sprinkle sunflower seeds or chopped almonds over salads. Or try adding chopped walnuts or almonds to cooked vegetables. ? Try some vegetarian meals using beans and peas. Add garbanzo or kidney beans to salads. Make burritos and tacos with mashed ratliff beans or black beans. Where can you learn more? Go to https://[place].[employer]. org and sign in to your [employer] account. Enter C516 in the [facility] box to learn more about \"DASH Diet: Care Instructions. \"     If you do not have an account, please click on the \"Sign Up Now\" link. Current as of: August 31, 2020               Content Version: 12.9  © 7947-5527 Healthwise, Incorporated. Care instructions adapted under license by [facility] ([facility]). If you have questions about a medical condition or this instruction, always ask your healthcare professional. [address] any warranty or liability for your use of this information.

## 2021-08-10 ENCOUNTER — HOSPITAL ENCOUNTER (OUTPATIENT)
Dept: GENERAL RADIOLOGY | Age: 41
Discharge: HOME | End: 2021-08-12
Payer: MEDICAID

## 2021-08-10 ENCOUNTER — HOSPITAL ENCOUNTER (OUTPATIENT)
Age: 41
Discharge: HOME | End: 2021-08-12
Payer: MEDICAID

## 2021-08-10 DIAGNOSIS — S82.292D: ICD-10-CM

## 2021-08-10 PROCEDURE — 73590 X-RAY EXAM OF LOWER LEG: CPT

## 2021-10-12 ENCOUNTER — TELEPHONE (OUTPATIENT)
Dept: SURGERY | Age: 41
End: 2021-10-12

## 2021-10-28 ENCOUNTER — OFFICE VISIT (OUTPATIENT)
Dept: FAMILY MEDICINE CLINIC | Age: 41
End: 2021-10-28
Payer: MEDICAID

## 2021-10-28 VITALS
OXYGEN SATURATION: 94 % | HEART RATE: 101 BPM | SYSTOLIC BLOOD PRESSURE: 130 MMHG | DIASTOLIC BLOOD PRESSURE: 70 MMHG | WEIGHT: 180.2 LBS | BODY MASS INDEX: 26.6 KG/M2

## 2021-10-28 DIAGNOSIS — J45.20 MILD INTERMITTENT ASTHMA WITHOUT COMPLICATION: Primary | ICD-10-CM

## 2021-10-28 DIAGNOSIS — S39.012A STRAIN OF LUMBAR PARASPINOUS MUSCLE, INITIAL ENCOUNTER: ICD-10-CM

## 2021-10-28 DIAGNOSIS — G47.00 INSOMNIA, UNSPECIFIED TYPE: ICD-10-CM

## 2021-10-28 DIAGNOSIS — I10 PRIMARY HYPERTENSION: ICD-10-CM

## 2021-10-28 DIAGNOSIS — F17.200 CURRENT EVERY DAY SMOKER: ICD-10-CM

## 2021-10-28 PROBLEM — F14.10 COCAINE ABUSE (HCC): Status: RESOLVED | Noted: 2018-06-22 | Resolved: 2021-10-28

## 2021-10-28 PROBLEM — F11.90 CHRONIC, CONTINUOUS USE OF OPIOIDS: Status: RESOLVED | Noted: 2020-10-21 | Resolved: 2021-10-28

## 2021-10-28 PROCEDURE — G8419 CALC BMI OUT NRM PARAM NOF/U: HCPCS | Performed by: FAMILY MEDICINE

## 2021-10-28 PROCEDURE — 99406 BEHAV CHNG SMOKING 3-10 MIN: CPT | Performed by: FAMILY MEDICINE

## 2021-10-28 PROCEDURE — G8484 FLU IMMUNIZE NO ADMIN: HCPCS | Performed by: FAMILY MEDICINE

## 2021-10-28 PROCEDURE — 4004F PT TOBACCO SCREEN RCVD TLK: CPT | Performed by: FAMILY MEDICINE

## 2021-10-28 PROCEDURE — G8427 DOCREV CUR MEDS BY ELIG CLIN: HCPCS | Performed by: FAMILY MEDICINE

## 2021-10-28 PROCEDURE — 99214 OFFICE O/P EST MOD 30 MIN: CPT | Performed by: FAMILY MEDICINE

## 2021-10-28 RX ORDER — IBUPROFEN 200 MG
400 CAPSULE ORAL 3 TIMES DAILY
COMMUNITY
Start: 2021-08-12 | End: 2022-09-21

## 2021-10-28 RX ORDER — KETOROLAC TROMETHAMINE 30 MG/ML
60 INJECTION, SOLUTION INTRAMUSCULAR; INTRAVENOUS ONCE
Status: COMPLETED | OUTPATIENT
Start: 2021-10-28 | End: 2021-10-28

## 2021-10-28 RX ORDER — ALBUTEROL SULFATE 90 UG/1
2 AEROSOL, METERED RESPIRATORY (INHALATION) EVERY 4 HOURS PRN
Qty: 8.5 G | Refills: 1 | Status: SHIPPED | OUTPATIENT
Start: 2021-10-28

## 2021-10-28 RX ORDER — HYDROCODONE BITARTRATE AND ACETAMINOPHEN 7.5; 325 MG/1; MG/1
TABLET ORAL
COMMUNITY
Start: 2021-09-29 | End: 2022-09-21

## 2021-10-28 RX ORDER — LISINOPRIL 10 MG/1
TABLET ORAL
COMMUNITY
Start: 2021-10-22 | End: 2021-11-18

## 2021-10-28 RX ORDER — ESZOPICLONE 3 MG/1
3 TABLET, FILM COATED ORAL NIGHTLY PRN
Qty: 30 TABLET | Refills: 0 | Status: SHIPPED | OUTPATIENT
Start: 2021-10-28 | End: 2021-11-27

## 2021-10-28 RX ORDER — TIZANIDINE 4 MG/1
4 TABLET ORAL EVERY 8 HOURS PRN
Qty: 30 TABLET | Refills: 0 | Status: SHIPPED | OUTPATIENT
Start: 2021-10-28 | End: 2022-09-21

## 2021-10-28 RX ADMIN — KETOROLAC TROMETHAMINE 60 MG: 30 INJECTION, SOLUTION INTRAMUSCULAR; INTRAVENOUS at 15:32

## 2021-10-28 SDOH — ECONOMIC STABILITY: FOOD INSECURITY: WITHIN THE PAST 12 MONTHS, THE FOOD YOU BOUGHT JUST DIDN'T LAST AND YOU DIDN'T HAVE MONEY TO GET MORE.: NEVER TRUE

## 2021-10-28 SDOH — ECONOMIC STABILITY: FOOD INSECURITY: WITHIN THE PAST 12 MONTHS, YOU WORRIED THAT YOUR FOOD WOULD RUN OUT BEFORE YOU GOT MONEY TO BUY MORE.: NEVER TRUE

## 2021-10-28 ASSESSMENT — ENCOUNTER SYMPTOMS
RHINORRHEA: 0
WHEEZING: 1
HOARSE VOICE: 0
SHORTNESS OF BREATH: 0
TROUBLE SWALLOWING: 0
HEMOPTYSIS: 0
GASTROINTESTINAL NEGATIVE: 1
DIFFICULTY BREATHING: 0
BLURRED VISION: 0
CHEST TIGHTNESS: 0
HEARTBURN: 0
FREQUENT THROAT CLEARING: 0
ORTHOPNEA: 0
ALLERGIC/IMMUNOLOGIC NEGATIVE: 1
SPUTUM PRODUCTION: 0
EYES NEGATIVE: 1

## 2021-10-28 ASSESSMENT — PATIENT HEALTH QUESTIONNAIRE - PHQ9
SUM OF ALL RESPONSES TO PHQ9 QUESTIONS 1 & 2: 0
1. LITTLE INTEREST OR PLEASURE IN DOING THINGS: 0
SUM OF ALL RESPONSES TO PHQ QUESTIONS 1-9: 0
2. FEELING DOWN, DEPRESSED OR HOPELESS: 0
SUM OF ALL RESPONSES TO PHQ QUESTIONS 1-9: 0
SUM OF ALL RESPONSES TO PHQ QUESTIONS 1-9: 0

## 2021-10-28 ASSESSMENT — SOCIAL DETERMINANTS OF HEALTH (SDOH): HOW HARD IS IT FOR YOU TO PAY FOR THE VERY BASICS LIKE FOOD, HOUSING, MEDICAL CARE, AND HEATING?: NOT HARD AT ALL

## 2021-10-28 NOTE — PATIENT INSTRUCTIONS
Patient Education        Preventing Falls: Care Instructions  Your Care Instructions     Getting around your home safely can be a challenge if you have injuries or health problems that make it easy for you to fall. Loose rugs and furniture in walkways are among the dangers for many older people who have problems walking or who have poor eyesight. People who have conditions such as arthritis, osteoporosis, or dementia also have to be careful not to fall. You can make your home safer with a few simple measures. Follow-up care is a key part of your treatment and safety. Be sure to make and go to all appointments, and call your doctor if you are having problems. It's also a good idea to know your test results and keep a list of the medicines you take. How can you care for yourself at home? Taking care of yourself  · You may get dizzy if you do not drink enough water. To prevent dehydration, drink plenty of fluids. Choose water and other clear liquids. If you have kidney, heart, or liver disease and have to limit fluids, talk with your doctor before you increase the amount of fluids you drink. · Exercise regularly to improve your strength, muscle tone, and balance. Walk if you can. Swimming may be a good choice if you cannot walk easily. · Have your vision and hearing checked each year or any time you notice a change. If you have trouble seeing and hearing, you might not be able to avoid objects and could lose your balance. · Know the side effects of the medicines you take. Ask your doctor or pharmacist whether the medicines you take can affect your balance. Sleeping pills or sedatives can affect your balance. · Limit the amount of alcohol you drink. Alcohol can impair your balance and other senses. · Ask your doctor whether calluses or corns on your feet need to be removed. If you wear loose-fitting shoes because of calluses or corns, you can lose your balance and fall.   · Talk to your doctor if you have numbness in your feet. Preventing falls at home  · Remove raised doorway thresholds, throw rugs, and clutter. Repair loose carpet or raised areas in the floor. · Move furniture and electrical cords to keep them out of walking paths. · Use nonskid floor wax, and wipe up spills right away, especially on ceramic tile floors. · If you use a walker or cane, put rubber tips on it. If you use crutches, clean the bottoms of them regularly with an abrasive pad, such as steel wool. · Keep your house well lit, especially [place], and outside walkways. Use night-lights in areas such as hallways and bathrooms. Add extra light switches or use remote switches (such as switches that go on or off when you clap your hands) to make it easier to turn lights on if you have to get up during the night. · Install sturdy handrails on stairways. · Move items in your cabinets so that the things you use a lot are on the lower shelves (about waist level). · Keep a cordless phone and a flashlight with new batteries by your bed. If possible, put a phone in each of the main rooms of your house, or carry a cell phone in case you fall and cannot reach a phone. Or, you can wear a device around your neck or wrist. You push a button that sends a signal for help. · Wear low-heeled shoes that fit well and give your feet good support. Use footwear with nonskid soles. Check the heels and soles of your shoes for wear. Repair or replace worn heels or soles. · Do not wear socks without shoes on wood floors. · Walk on the grass when the sidewalks are slippery. If you live in an area that gets snow and ice in the winter, sprinkle salt on slippery steps and sidewalks. Preventing falls in the bath  · Install grab bars and nonskid mats inside and outside your shower or tub and near the toilet and sinks. · Use shower chairs and bath benches. · Use a hand-held shower head that will allow you to sit while showering.   · Get into a tub or shower by putting the weaker leg in first. Get out of a tub or shower with your strong side first.  · Repair loose toilet seats and consider installing a raised toilet seat to make getting on and off the toilet easier. · Keep your bathroom door unlocked while you are in the shower. Where can you learn more? Go to https://Medicastpepiceweb.[employer]. org and sign in to your [employer] account. Enter [phone] [id number] [phone] in the [facility] box to learn more about \"Preventing Falls: Care Instructions. \"     If you do not have an account, please click on the \"Sign Up Now\" link. Current as of: December 7, 2020               Content Version: 13.0  © 2006-2021 Healthwise, Incorporated. Care instructions adapted under license by [facility] ([facility]). If you have questions about a medical condition or this instruction, always ask your healthcare professional. [place] [address] any warranty or liability for your use of this information.

## 2021-10-28 NOTE — PROGRESS NOTES
AP[facility] Progress Note    Date:10/28/2021         Patient Name:Timmy Howard     YOB: 1980     Age:41 y.o. Assessment/Plan        Problem List Items Addressed This Visit        Circulatory    Hypertension      Well-controlled, continue current medications, continue current treatment plan, medication adherence emphasized and lifestyle modifications recommended            Respiratory    Mild intermittent asthma without complication - Primary      Borderline controlled, continue current medications and continue current treatment plan         Relevant Medications    albuterol sulfate  (90 Base) MCG/ACT inhaler       Other    Current every day smoker      Given options and advised cessation         Insomnia      Uncontrolled, changes made today: trial lunesta         Relevant Medications    eszopiclone (ESZOPICLONE) 3 MG TABS      Other Visit Diagnoses     Strain of lumbar paraspinous muscle, initial encounter        Relevant Medications    tiZANidine (ZANAFLEX) 4 MG tablet    ketorolac (TORADOL) injection 60 mg (Start on 10/28/2021  3:45 PM)           Return in about 3 months (around 1/28/2022). Electronically signed by Wendy Avery DO on 10/28/21         Subjective     Pipo Ruffin is a 46 y.o. male presenting today for   Chief Complaint   Patient presents with    3 Month Follow-Up   . Pipo Ruffin is a 47 y.o. male here for discussion regarding smoking cessation. He began smoking 21 years ago. He currently smokes 1.5 packs per day. He has attempted to quit smoking in the past, most recently several months ago. Best success quitting using willpower. Barriers to quitting include fear of failing again and feels under lots of stress just now. He denies constant cough. He is ready to quit b/c his partner is wanting him to.  Has failed using patches - was doing really well but has started smoking again in last 2 weeks    Pipo Ruffin is a 47 y.o. male who complains of insomnia. Onset was several months ago. Patient describes symptoms as frequent night time awakening and difficulty falling asleep. Patient has found no relief with over the counter diphenhydramine and melatonin use. Associated symptoms include: anxiety, depression and stress. Patient denies daytime somnolence. Symptoms have progressed to a point and plateaued. Trazodone hasn't worked. He started taking 2 at once so he's been on 100mg. Has taken 200mg now and not working well    Hypertension  This is a chronic problem. The current episode started more than 1 month ago. The problem has been gradually improving since onset. The problem is controlled. Associated symptoms include anxiety. Pertinent negatives include no blurred vision, malaise/fatigue, orthopnea, palpitations, peripheral edema, PND, shortness of breath or sweats. Past treatments include beta blockers. The current treatment provides significant improvement. Asthma  He complains of wheezing. There is no chest tightness, difficulty breathing, frequent throat clearing, hemoptysis, hoarse voice, shortness of breath or sputum production. This is a chronic problem. The current episode started more than 1 year ago. The problem occurs rarely. The problem has been gradually improving. Pertinent negatives include no appetite change, dyspnea on exertion, ear congestion, ear pain, heartburn, malaise/fatigue, nasal congestion, orthopnea, PND, postnasal drip, rhinorrhea, sneezing, sweats, trouble swallowing or weight loss. His symptoms are alleviated by beta-agonist. He reports significant improvement on treatment. His past medical history is significant for asthma. Review of Systems   Review of Systems   Constitutional: Negative. Negative for appetite change, malaise/fatigue and weight loss. HENT: Negative. Negative for ear pain, hoarse voice, postnasal drip, rhinorrhea, sneezing and trouble swallowing. Eyes: Negative.   Negative for blurred vision. Respiratory: Positive for wheezing. Negative for hemoptysis, sputum production and shortness of breath. Cardiovascular: Negative. Negative for dyspnea on exertion, palpitations, orthopnea and PND. Gastrointestinal: Negative. Negative for heartburn. Endocrine: Negative. Genitourinary: Negative. Musculoskeletal: Negative. Skin: Negative. Allergic/Immunologic: Negative. Neurological: Negative. Hematological: Negative. Psychiatric/Behavioral: Negative. All other systems reviewed and are negative. Medications     Current Outpatient Medications   Medication Sig Dispense Refill    HYDROcodone-acetaminophen (NORCO) 7.5-325 MG per tablet take 1 tablet by mouth every 6 hours if needed for pain . .DO NOT FILL TILL 9/29/21      lisinopril (PRINIVIL;ZESTRIL) 10 MG tablet take 1 tablet by mouth once daily      calcium citrate (CALCITRATE) 950 (200 Ca) MG tablet Take 400 mg by mouth 3 times daily      albuterol sulfate  (90 Base) MCG/ACT inhaler Inhale 2 puffs into the lungs every 4 hours as needed for Wheezing 8.5 g 1    tiZANidine (ZANAFLEX) 4 MG tablet Take 1 tablet by mouth every 8 hours as needed (back pain) 30 tablet 0    eszopiclone (ESZOPICLONE) 3 MG TABS Take 1 tablet by mouth nightly as needed (insomnia) for up to 30 days.  30 tablet 0    ondansetron (ZOFRAN) 4 MG tablet Take 4 mg by mouth every 8 hours as needed for Nausea or Vomiting      lisinopril (PRINIVIL;ZESTRIL) 20 MG tablet Take 1 tablet by mouth daily 30 tablet 2    hydrOXYzine (VISTARIL) 25 MG capsule Take 25 mg by mouth daily      ibuprofen (ADVIL;MOTRIN) 800 MG tablet Take 800 mg by mouth 3 times daily      Blood Pressure KIT Check blood pressure daily as directed 1 kit 0    cephALEXin (KEFLEX) 500 MG capsule Take 500 mg by mouth 4 times daily      sulfamethoxazole-trimethoprim (BACTRIM;SEPTRA) 400-80 MG per tablet Take 1 tablet by mouth 2 times daily      oxyCODONE (OXY-IR) 15 MG immediate diaphoretic. HENT:      Head: Normocephalic and atraumatic. Right Ear: External ear normal.      Left Ear: External ear normal.   Eyes:      General: No scleral icterus. Right eye: No discharge. Left eye: No discharge. Conjunctiva/sclera: Conjunctivae normal.   Cardiovascular:      Rate and Rhythm: Normal rate and regular rhythm. Pulses: Normal pulses. Heart sounds: Normal heart sounds. No murmur heard. No friction rub. No gallop. Pulmonary:      Effort: Pulmonary effort is normal. No respiratory distress. Breath sounds: Normal breath sounds. No stridor. No wheezing, rhonchi or rales. Chest:      Chest wall: No tenderness. Skin:     General: Skin is warm. Coloration: Skin is not jaundiced or pale. Neurological:      Mental Status: He is alert and oriented to person, place, and time. Mental status is at baseline. Psychiatric:         Mood and Affect: Mood normal.         Behavior: Behavior normal.         Thought Content: Thought content normal.         Judgment: Judgment normal.         Labs/Imaging/Diagnostics   Labs:  Hemoglobin A1C   Date Value Ref Range Status   06/02/2021 5.9 4.0 - 6.0 % Final       Imaging Last 24 Hours:  XR TIBIA FIBULA LEFT (2 VIEWS)  Narrative: EXAMINATION:  4 XRAY VIEWS OF THE LEFT TIBIA AND FIBULA    8/10/2021 12:49 pm    COMPARISON:  Left tib fib radiographs performed 06/22/2021. HISTORY:  ORDERING SYSTEM PROVIDED HISTORY: Closed complex fracture of left tibia with  routine healing, subsequent encounter  TECHNOLOGIST PROVIDED HISTORY:  Reason for Exam: Closed complex fracture of left of tibia with routine healing  Acuity: Unknown  Type of Exam: Unknown    FINDINGS:  There is redemonstration of remote mid tibial and fibular fractures with  stable alignment. The knee and ankle joint spaces are maintained. The  surrounding soft tissues are unremarkable.   Impression: Remote mid tibial and fibular fractures with stable alignment.

## 2021-11-01 ENCOUNTER — TELEPHONE (OUTPATIENT)
Dept: FAMILY MEDICINE CLINIC | Age: 41
End: 2021-11-01

## 2021-11-01 DIAGNOSIS — G47.00 INSOMNIA, UNSPECIFIED TYPE: Primary | ICD-10-CM

## 2021-11-01 RX ORDER — TEMAZEPAM 15 MG/1
15 CAPSULE ORAL NIGHTLY PRN
Qty: 30 CAPSULE | Refills: 0 | Status: SHIPPED | OUTPATIENT
Start: 2021-11-01 | End: 2021-12-01

## 2021-11-01 NOTE — TELEPHONE ENCOUNTER
No unfortunately I do not prescribe Ambien and I don't think it is a good medication for Melchor to use.  Would he like to see a sleep specialist?

## 2021-11-01 NOTE — TELEPHONE ENCOUNTER
Patient called. He states sleep medications you sent in are not covered by his insurance.    Patient states Ambien is covered  Patient is asking if you will prescribe this so he can have something   Please advise

## 2021-11-01 NOTE — TELEPHONE ENCOUNTER
Called patient with message. Patient states others on the list he was given by insurance are:     Zolpidem IR  Zaleplon  Temazepam  Estazolam    He wants to know if you are comfortable prescribing any of these.   Please advise

## 2021-11-08 ENCOUNTER — HOSPITAL ENCOUNTER (OUTPATIENT)
Dept: GENERAL RADIOLOGY | Age: 41
Discharge: HOME | End: 2021-11-10
Payer: MEDICAID

## 2021-11-08 ENCOUNTER — HOSPITAL ENCOUNTER (OUTPATIENT)
Age: 41
Discharge: HOME | End: 2021-11-10
Payer: MEDICAID

## 2021-11-08 DIAGNOSIS — S82.292A: ICD-10-CM

## 2021-11-08 PROCEDURE — 73590 X-RAY EXAM OF LOWER LEG: CPT

## 2021-11-18 RX ORDER — LISINOPRIL 10 MG/1
TABLET ORAL
Qty: 30 TABLET | Refills: 5 | Status: SHIPPED | OUTPATIENT
Start: 2021-11-18

## 2021-11-18 NOTE — TELEPHONE ENCOUNTER
Sherley Hayden is calling to request a refill on the following medication(s):    Medication Request:  Requested Prescriptions     Pending Prescriptions Disp Refills    lisinopril (PRINIVIL;ZESTRIL) 10 MG tablet [Pharmacy Med Name: LISINOPRIL 10 MG TABLET] 30 tablet      Sig: take 1 tablet by mouth once daily       Last Visit Date (If Applicable):  64/44/5827    Next Visit Date:    1/31/2022

## 2022-09-21 ENCOUNTER — APPOINTMENT (OUTPATIENT)
Dept: GENERAL RADIOLOGY | Age: 42
DRG: 720 | End: 2022-09-21
Payer: COMMERCIAL

## 2022-09-21 ENCOUNTER — HOSPITAL ENCOUNTER (INPATIENT)
Age: 42
LOS: 1 days | Discharge: ANOTHER HOSPITAL | DRG: 720 | End: 2022-09-22
Attending: EMERGENCY MEDICINE | Admitting: INTERNAL MEDICINE
Payer: COMMERCIAL

## 2022-09-21 ENCOUNTER — APPOINTMENT (OUTPATIENT)
Dept: ULTRASOUND IMAGING | Age: 42
DRG: 720 | End: 2022-09-21
Payer: COMMERCIAL

## 2022-09-21 ENCOUNTER — APPOINTMENT (OUTPATIENT)
Dept: CT IMAGING | Age: 42
DRG: 720 | End: 2022-09-21
Payer: COMMERCIAL

## 2022-09-21 DIAGNOSIS — A41.9 SEPTICEMIA (HCC): Primary | ICD-10-CM

## 2022-09-21 DIAGNOSIS — E16.2 HYPOGLYCEMIA: ICD-10-CM

## 2022-09-21 DIAGNOSIS — E87.20 LACTIC ACIDOSIS: ICD-10-CM

## 2022-09-21 DIAGNOSIS — T40.601A OPIATE OVERDOSE, ACCIDENTAL OR UNINTENTIONAL, INITIAL ENCOUNTER (HCC): ICD-10-CM

## 2022-09-21 DIAGNOSIS — J18.9 PNEUMONIA OF BOTH LOWER LOBES DUE TO INFECTIOUS ORGANISM: ICD-10-CM

## 2022-09-21 PROBLEM — I95.9 HYPOTENSION: Status: ACTIVE | Noted: 2022-09-21

## 2022-09-21 PROBLEM — S82.252P: Status: ACTIVE | Noted: 2022-09-13

## 2022-09-21 LAB
ABSOLUTE EOS #: 0 K/UL (ref 0–0.44)
ABSOLUTE IMMATURE GRANULOCYTE: 0.23 K/UL (ref 0–0.3)
ABSOLUTE LYMPH #: 1.61 K/UL (ref 1.1–3.7)
ABSOLUTE MONO #: 2.3 K/UL (ref 0.1–1.2)
ALBUMIN SERPL-MCNC: 4.3 G/DL (ref 3.5–5.2)
ALP BLD-CCNC: 95 U/L (ref 40–129)
ALT SERPL-CCNC: 31 U/L (ref 5–41)
AMPHETAMINE SCREEN URINE: POSITIVE
ANION GAP SERPL CALCULATED.3IONS-SCNC: 16 MMOL/L (ref 9–17)
AST SERPL-CCNC: 37 U/L
BARBITURATE SCREEN URINE: NEGATIVE
BASOPHILS # BLD: 0 % (ref 0–2)
BASOPHILS ABSOLUTE: 0 K/UL (ref 0–0.2)
BENZODIAZEPINE SCREEN, URINE: POSITIVE
BILIRUB SERPL-MCNC: 0.2 MG/DL (ref 0.3–1.2)
BILIRUBIN DIRECT: <0.1 MG/DL
BILIRUBIN URINE: NEGATIVE
BILIRUBIN, INDIRECT: ABNORMAL MG/DL (ref 0–1)
BUN BLDV-MCNC: 18 MG/DL (ref 6–20)
BUN/CREAT BLD: 9 (ref 9–20)
CALCIUM SERPL-MCNC: 9.7 MG/DL (ref 8.6–10.4)
CANNABINOID SCREEN URINE: NEGATIVE
CHLORIDE BLD-SCNC: 100 MMOL/L (ref 98–107)
CO2: 26 MMOL/L (ref 20–31)
COCAINE METABOLITE, URINE: NEGATIVE
COLOR: YELLOW
CREAT SERPL-MCNC: 1.91 MG/DL (ref 0.7–1.2)
CREATININE URINE: 105 MG/DL (ref 39–259)
EOSINOPHILS RELATIVE PERCENT: 0 % (ref 1–4)
EPITHELIAL CELLS UA: NORMAL /HPF (ref 0–5)
FENTANYL URINE: POSITIVE
GFR AFRICAN AMERICAN: 48 ML/MIN
GFR NON-AFRICAN AMERICAN: 39 ML/MIN
GFR SERPL CREATININE-BSD FRML MDRD: ABNORMAL ML/MIN/{1.73_M2}
GLUCOSE BLD-MCNC: 105 MG/DL (ref 75–110)
GLUCOSE BLD-MCNC: 106 MG/DL (ref 75–110)
GLUCOSE BLD-MCNC: 121 MG/DL (ref 75–110)
GLUCOSE BLD-MCNC: 58 MG/DL (ref 70–99)
GLUCOSE BLD-MCNC: 97 MG/DL (ref 75–110)
GLUCOSE URINE: NEGATIVE
HAV IGM SER IA-ACNC: NONREACTIVE
HCT VFR BLD CALC: 36.1 % (ref 40.7–50.3)
HEMOGLOBIN: 11.7 G/DL (ref 13–17)
HEPATITIS B CORE IGM ANTIBODY: NONREACTIVE
HEPATITIS B SURFACE ANTIGEN: NONREACTIVE
HEPATITIS C ANTIBODY: NONREACTIVE
HIV AG/AB: NONREACTIVE
IMMATURE GRANULOCYTES: 1 %
KETONES, URINE: NEGATIVE
LACTIC ACID, SEPSIS: 1 MMOL/L (ref 0.5–1.9)
LACTIC ACID, SEPSIS: 4.1 MMOL/L (ref 0.5–1.9)
LEUKOCYTE ESTERASE, URINE: NEGATIVE
LIPASE: 39 U/L (ref 13–60)
LYMPHOCYTES # BLD: 7 % (ref 24–43)
MCH RBC QN AUTO: 30.2 PG (ref 25.2–33.5)
MCHC RBC AUTO-ENTMCNC: 32.4 G/DL (ref 28.4–34.8)
MCV RBC AUTO: 93 FL (ref 82.6–102.9)
METHADONE SCREEN, URINE: NEGATIVE
MONOCYTES # BLD: 10 % (ref 3–12)
NITRITE, URINE: NEGATIVE
NRBC AUTOMATED: 0 PER 100 WBC
OPIATES, URINE: NEGATIVE
OSMOLALITY URINE: 546 MOSM/KG (ref 80–1300)
OXYCODONE SCREEN URINE: POSITIVE
PDW BLD-RTO: 13.7 % (ref 11.8–14.4)
PH UA: 5.5 (ref 5–8)
PHENCYCLIDINE, URINE: NEGATIVE
PLATELET # BLD: 411 K/UL (ref 138–453)
PMV BLD AUTO: 8.9 FL (ref 8.1–13.5)
POTASSIUM SERPL-SCNC: 4.3 MMOL/L (ref 3.7–5.3)
PROCALCITONIN: 1.25 NG/ML
PROTEIN UA: NEGATIVE
RBC # BLD: 3.88 M/UL (ref 4.21–5.77)
RBC UA: NORMAL /HPF (ref 0–2)
SARS-COV-2, RAPID: NOT DETECTED
SEG NEUTROPHILS: 82 % (ref 36–65)
SEGMENTED NEUTROPHILS ABSOLUTE COUNT: 18.86 K/UL (ref 1.5–8.1)
SODIUM BLD-SCNC: 142 MMOL/L (ref 135–144)
SODIUM,UR: 40 MMOL/L
SPECIFIC GRAVITY UA: 1.02 (ref 1–1.03)
SPECIMEN DESCRIPTION: NORMAL
TEST INFORMATION: ABNORMAL
TOTAL PROTEIN, URINE: 17 MG/DL
TOTAL PROTEIN: 7.8 G/DL (ref 6.4–8.3)
TURBIDITY: CLEAR
URINE HGB: NEGATIVE
UROBILINOGEN, URINE: NORMAL
WBC # BLD: 23 K/UL (ref 3.5–11.3)
WBC UA: NORMAL /HPF (ref 0–5)

## 2022-09-21 PROCEDURE — 6370000000 HC RX 637 (ALT 250 FOR IP): Performed by: NURSE PRACTITIONER

## 2022-09-21 PROCEDURE — 83605 ASSAY OF LACTIC ACID: CPT

## 2022-09-21 PROCEDURE — 87389 HIV-1 AG W/HIV-1&-2 AB AG IA: CPT

## 2022-09-21 PROCEDURE — 76770 US EXAM ABDO BACK WALL COMP: CPT

## 2022-09-21 PROCEDURE — 99223 1ST HOSP IP/OBS HIGH 75: CPT | Performed by: INTERNAL MEDICINE

## 2022-09-21 PROCEDURE — 96361 HYDRATE IV INFUSION ADD-ON: CPT

## 2022-09-21 PROCEDURE — 83935 ASSAY OF URINE OSMOLALITY: CPT

## 2022-09-21 PROCEDURE — 99285 EMERGENCY DEPT VISIT HI MDM: CPT

## 2022-09-21 PROCEDURE — 2580000003 HC RX 258: Performed by: EMERGENCY MEDICINE

## 2022-09-21 PROCEDURE — 84145 PROCALCITONIN (PCT): CPT

## 2022-09-21 PROCEDURE — 80048 BASIC METABOLIC PNL TOTAL CA: CPT

## 2022-09-21 PROCEDURE — 85025 COMPLETE CBC W/AUTO DIFF WBC: CPT

## 2022-09-21 PROCEDURE — 84300 ASSAY OF URINE SODIUM: CPT

## 2022-09-21 PROCEDURE — 2580000003 HC RX 258: Performed by: INTERNAL MEDICINE

## 2022-09-21 PROCEDURE — 80307 DRUG TEST PRSMV CHEM ANLYZR: CPT

## 2022-09-21 PROCEDURE — 81001 URINALYSIS AUTO W/SCOPE: CPT

## 2022-09-21 PROCEDURE — 80074 ACUTE HEPATITIS PANEL: CPT

## 2022-09-21 PROCEDURE — 82947 ASSAY GLUCOSE BLOOD QUANT: CPT

## 2022-09-21 PROCEDURE — 6370000000 HC RX 637 (ALT 250 FOR IP): Performed by: INTERNAL MEDICINE

## 2022-09-21 PROCEDURE — 6360000004 HC RX CONTRAST MEDICATION: Performed by: EMERGENCY MEDICINE

## 2022-09-21 PROCEDURE — 6360000002 HC RX W HCPCS: Performed by: NURSE PRACTITIONER

## 2022-09-21 PROCEDURE — 84156 ASSAY OF PROTEIN URINE: CPT

## 2022-09-21 PROCEDURE — 6360000002 HC RX W HCPCS: Performed by: EMERGENCY MEDICINE

## 2022-09-21 PROCEDURE — 71260 CT THORAX DX C+: CPT | Performed by: EMERGENCY MEDICINE

## 2022-09-21 PROCEDURE — 87635 SARS-COV-2 COVID-19 AMP PRB: CPT

## 2022-09-21 PROCEDURE — 6360000002 HC RX W HCPCS: Performed by: INTERNAL MEDICINE

## 2022-09-21 PROCEDURE — 80076 HEPATIC FUNCTION PANEL: CPT

## 2022-09-21 PROCEDURE — 96374 THER/PROPH/DIAG INJ IV PUSH: CPT

## 2022-09-21 PROCEDURE — 2580000003 HC RX 258: Performed by: NURSE PRACTITIONER

## 2022-09-21 PROCEDURE — 87641 MR-STAPH DNA AMP PROBE: CPT

## 2022-09-21 PROCEDURE — 73590 X-RAY EXAM OF LOWER LEG: CPT

## 2022-09-21 PROCEDURE — 2000000000 HC ICU R&B

## 2022-09-21 PROCEDURE — 83690 ASSAY OF LIPASE: CPT

## 2022-09-21 PROCEDURE — 82570 ASSAY OF URINE CREATININE: CPT

## 2022-09-21 PROCEDURE — 36415 COLL VENOUS BLD VENIPUNCTURE: CPT

## 2022-09-21 PROCEDURE — 87040 BLOOD CULTURE FOR BACTERIA: CPT

## 2022-09-21 PROCEDURE — 93005 ELECTROCARDIOGRAM TRACING: CPT | Performed by: STUDENT IN AN ORGANIZED HEALTH CARE EDUCATION/TRAINING PROGRAM

## 2022-09-21 PROCEDURE — APPSS45 APP SPLIT SHARED TIME 31-45 MINUTES: Performed by: NURSE PRACTITIONER

## 2022-09-21 RX ORDER — SODIUM CHLORIDE 0.9 % (FLUSH) 0.9 %
5-40 SYRINGE (ML) INJECTION EVERY 12 HOURS SCHEDULED
Status: DISCONTINUED | OUTPATIENT
Start: 2022-09-21 | End: 2022-09-22 | Stop reason: HOSPADM

## 2022-09-21 RX ORDER — POLYETHYLENE GLYCOL 3350 17 G/17G
17 POWDER, FOR SOLUTION ORAL DAILY PRN
Status: DISCONTINUED | OUTPATIENT
Start: 2022-09-21 | End: 2022-09-22 | Stop reason: HOSPADM

## 2022-09-21 RX ORDER — HYDROMORPHONE HYDROCHLORIDE 1 MG/ML
1 INJECTION, SOLUTION INTRAMUSCULAR; INTRAVENOUS; SUBCUTANEOUS EVERY 4 HOURS PRN
Status: DISCONTINUED | OUTPATIENT
Start: 2022-09-21 | End: 2022-09-22 | Stop reason: HOSPADM

## 2022-09-21 RX ORDER — DIAZEPAM 5 MG/1
5 TABLET ORAL EVERY 6 HOURS PRN
Status: DISCONTINUED | OUTPATIENT
Start: 2022-09-21 | End: 2022-09-22 | Stop reason: HOSPADM

## 2022-09-21 RX ORDER — ONDANSETRON 4 MG/1
4 TABLET, ORALLY DISINTEGRATING ORAL EVERY 8 HOURS PRN
Status: DISCONTINUED | OUTPATIENT
Start: 2022-09-21 | End: 2022-09-22 | Stop reason: HOSPADM

## 2022-09-21 RX ORDER — SODIUM CHLORIDE 9 MG/ML
INJECTION, SOLUTION INTRAVENOUS CONTINUOUS
Status: DISCONTINUED | OUTPATIENT
Start: 2022-09-21 | End: 2022-09-22 | Stop reason: HOSPADM

## 2022-09-21 RX ORDER — HYDROCODONE BITARTRATE AND ACETAMINOPHEN 5; 325 MG/1; MG/1
2 TABLET ORAL EVERY 4 HOURS PRN
Status: DISCONTINUED | OUTPATIENT
Start: 2022-09-21 | End: 2022-09-21

## 2022-09-21 RX ORDER — 0.9 % SODIUM CHLORIDE 0.9 %
1000 INTRAVENOUS SOLUTION INTRAVENOUS ONCE
Status: COMPLETED | OUTPATIENT
Start: 2022-09-21 | End: 2022-09-21

## 2022-09-21 RX ORDER — DEXTROSE MONOHYDRATE 100 MG/ML
INJECTION, SOLUTION INTRAVENOUS CONTINUOUS PRN
Status: DISCONTINUED | OUTPATIENT
Start: 2022-09-21 | End: 2022-09-22 | Stop reason: HOSPADM

## 2022-09-21 RX ORDER — SODIUM CHLORIDE 0.9 % (FLUSH) 0.9 %
5-40 SYRINGE (ML) INJECTION PRN
Status: DISCONTINUED | OUTPATIENT
Start: 2022-09-21 | End: 2022-09-22 | Stop reason: HOSPADM

## 2022-09-21 RX ORDER — OXYCODONE HYDROCHLORIDE AND ACETAMINOPHEN 5; 325 MG/1; MG/1
1 TABLET ORAL EVERY 4 HOURS PRN
Status: DISCONTINUED | OUTPATIENT
Start: 2022-09-21 | End: 2022-09-21

## 2022-09-21 RX ORDER — OXYCODONE HYDROCHLORIDE AND ACETAMINOPHEN 5; 325 MG/1; MG/1
2 TABLET ORAL EVERY 4 HOURS PRN
Status: DISCONTINUED | OUTPATIENT
Start: 2022-09-21 | End: 2022-09-21

## 2022-09-21 RX ORDER — ONDANSETRON 2 MG/ML
4 INJECTION INTRAMUSCULAR; INTRAVENOUS EVERY 6 HOURS PRN
Status: DISCONTINUED | OUTPATIENT
Start: 2022-09-21 | End: 2022-09-22 | Stop reason: HOSPADM

## 2022-09-21 RX ORDER — SODIUM CHLORIDE 0.9 % (FLUSH) 0.9 %
5-40 SYRINGE (ML) INJECTION EVERY 12 HOURS SCHEDULED
Status: DISCONTINUED | OUTPATIENT
Start: 2022-09-21 | End: 2022-09-21 | Stop reason: SDUPTHER

## 2022-09-21 RX ORDER — OXYCODONE HYDROCHLORIDE 5 MG/1
10 TABLET ORAL EVERY 4 HOURS PRN
Status: DISCONTINUED | OUTPATIENT
Start: 2022-09-21 | End: 2022-09-22 | Stop reason: HOSPADM

## 2022-09-21 RX ORDER — HYDROXYZINE PAMOATE 25 MG/1
25 CAPSULE ORAL DAILY
Status: CANCELLED | OUTPATIENT
Start: 2022-09-21

## 2022-09-21 RX ORDER — GABAPENTIN 400 MG/1
800 CAPSULE ORAL 3 TIMES DAILY
Status: DISCONTINUED | OUTPATIENT
Start: 2022-09-21 | End: 2022-09-22 | Stop reason: HOSPADM

## 2022-09-21 RX ORDER — SODIUM CHLORIDE 9 MG/ML
INJECTION, SOLUTION INTRAVENOUS PRN
Status: DISCONTINUED | OUTPATIENT
Start: 2022-09-21 | End: 2022-09-22 | Stop reason: HOSPADM

## 2022-09-21 RX ORDER — SODIUM CHLORIDE 0.9 % (FLUSH) 0.9 %
5-40 SYRINGE (ML) INJECTION PRN
Status: DISCONTINUED | OUTPATIENT
Start: 2022-09-21 | End: 2022-09-21 | Stop reason: SDUPTHER

## 2022-09-21 RX ORDER — DEXTROSE MONOHYDRATE 25 G/50ML
25 INJECTION, SOLUTION INTRAVENOUS ONCE
Status: DISCONTINUED | OUTPATIENT
Start: 2022-09-21 | End: 2022-09-21 | Stop reason: CLARIF

## 2022-09-21 RX ORDER — SODIUM CHLORIDE 0.9 % (FLUSH) 0.9 %
10 SYRINGE (ML) INJECTION PRN
Status: DISCONTINUED | OUTPATIENT
Start: 2022-09-21 | End: 2022-09-21 | Stop reason: SDUPTHER

## 2022-09-21 RX ORDER — SODIUM CHLORIDE 9 MG/ML
INJECTION, SOLUTION INTRAVENOUS PRN
Status: DISCONTINUED | OUTPATIENT
Start: 2022-09-21 | End: 2022-09-21 | Stop reason: SDUPTHER

## 2022-09-21 RX ORDER — 0.9 % SODIUM CHLORIDE 0.9 %
80 INTRAVENOUS SOLUTION INTRAVENOUS ONCE
Status: COMPLETED | OUTPATIENT
Start: 2022-09-21 | End: 2022-09-21

## 2022-09-21 RX ORDER — ONDANSETRON 2 MG/ML
4 INJECTION INTRAMUSCULAR; INTRAVENOUS ONCE
Status: COMPLETED | OUTPATIENT
Start: 2022-09-21 | End: 2022-09-21

## 2022-09-21 RX ORDER — ASPIRIN 81 MG/1
81 TABLET, CHEWABLE ORAL DAILY
Status: DISCONTINUED | OUTPATIENT
Start: 2022-09-21 | End: 2022-09-22 | Stop reason: HOSPADM

## 2022-09-21 RX ORDER — HEPARIN SODIUM 5000 [USP'U]/ML
5000 INJECTION, SOLUTION INTRAVENOUS; SUBCUTANEOUS EVERY 8 HOURS SCHEDULED
Status: DISCONTINUED | OUTPATIENT
Start: 2022-09-21 | End: 2022-09-22 | Stop reason: HOSPADM

## 2022-09-21 RX ORDER — CALCIUM CARBONATE 200(500)MG
500 TABLET,CHEWABLE ORAL
Status: DISCONTINUED | OUTPATIENT
Start: 2022-09-21 | End: 2022-09-22 | Stop reason: HOSPADM

## 2022-09-21 RX ORDER — HYDROCODONE BITARTRATE AND ACETAMINOPHEN 5; 325 MG/1; MG/1
1 TABLET ORAL EVERY 4 HOURS PRN
Status: DISCONTINUED | OUTPATIENT
Start: 2022-09-21 | End: 2022-09-21

## 2022-09-21 RX ORDER — ALBUTEROL SULFATE 90 UG/1
2 AEROSOL, METERED RESPIRATORY (INHALATION) EVERY 4 HOURS PRN
Status: DISCONTINUED | OUTPATIENT
Start: 2022-09-21 | End: 2022-09-22 | Stop reason: HOSPADM

## 2022-09-21 RX ORDER — SENNA AND DOCUSATE SODIUM 50; 8.6 MG/1; MG/1
1 TABLET, FILM COATED ORAL 2 TIMES DAILY
Status: DISCONTINUED | OUTPATIENT
Start: 2022-09-21 | End: 2022-09-22 | Stop reason: HOSPADM

## 2022-09-21 RX ADMIN — HEPARIN SODIUM 5000 UNITS: 5000 INJECTION INTRAVENOUS; SUBCUTANEOUS at 14:27

## 2022-09-21 RX ADMIN — CEFEPIME 2000 MG: 2 INJECTION, POWDER, FOR SOLUTION INTRAVENOUS at 14:24

## 2022-09-21 RX ADMIN — OXYCODONE AND ACETAMINOPHEN 2 TABLET: 5; 325 TABLET ORAL at 13:05

## 2022-09-21 RX ADMIN — SODIUM CHLORIDE, PRESERVATIVE FREE 10 ML: 5 INJECTION INTRAVENOUS at 08:31

## 2022-09-21 RX ADMIN — VANCOMYCIN HYDROCHLORIDE 1500 MG: 5 INJECTION, POWDER, LYOPHILIZED, FOR SOLUTION INTRAVENOUS at 16:30

## 2022-09-21 RX ADMIN — SODIUM CHLORIDE 1000 ML: 9 INJECTION, SOLUTION INTRAVENOUS at 08:46

## 2022-09-21 RX ADMIN — ONDANSETRON 4 MG: 2 INJECTION INTRAMUSCULAR; INTRAVENOUS at 08:45

## 2022-09-21 RX ADMIN — SODIUM CHLORIDE: 9 INJECTION, SOLUTION INTRAVENOUS at 19:56

## 2022-09-21 RX ADMIN — SODIUM CHLORIDE 1000 ML: 9 INJECTION, SOLUTION INTRAVENOUS at 07:49

## 2022-09-21 RX ADMIN — AZITHROMYCIN MONOHYDRATE 500 MG: 500 INJECTION, POWDER, LYOPHILIZED, FOR SOLUTION INTRAVENOUS at 10:39

## 2022-09-21 RX ADMIN — OXYCODONE 10 MG: 5 TABLET ORAL at 21:38

## 2022-09-21 RX ADMIN — IOPAMIDOL 75 ML: 755 INJECTION, SOLUTION INTRAVENOUS at 08:31

## 2022-09-21 RX ADMIN — SODIUM CHLORIDE 80 ML: 9 INJECTION, SOLUTION INTRAVENOUS at 08:32

## 2022-09-21 RX ADMIN — SODIUM CHLORIDE 125 ML/HR: 9 INJECTION, SOLUTION INTRAVENOUS at 10:01

## 2022-09-21 RX ADMIN — CEFTRIAXONE SODIUM 1000 MG: 1 INJECTION, POWDER, FOR SOLUTION INTRAMUSCULAR; INTRAVENOUS at 10:03

## 2022-09-21 RX ADMIN — DEXTROSE MONOHYDRATE 250 ML: 100 INJECTION, SOLUTION INTRAVENOUS at 08:45

## 2022-09-21 RX ADMIN — GABAPENTIN 800 MG: 400 CAPSULE ORAL at 13:05

## 2022-09-21 RX ADMIN — ASPIRIN 81 MG 81 MG: 81 TABLET ORAL at 14:26

## 2022-09-21 RX ADMIN — HEPARIN SODIUM 5000 UNITS: 5000 INJECTION INTRAVENOUS; SUBCUTANEOUS at 21:37

## 2022-09-21 RX ADMIN — DOCUSATE SODIUM 50MG AND SENNOSIDES 8.6MG 1 TABLET: 8.6; 5 TABLET, FILM COATED ORAL at 21:37

## 2022-09-21 RX ADMIN — GABAPENTIN 800 MG: 400 CAPSULE ORAL at 21:43

## 2022-09-21 RX ADMIN — OXYCODONE 10 MG: 5 TABLET ORAL at 17:20

## 2022-09-21 ASSESSMENT — PAIN DESCRIPTION - PAIN TYPE
TYPE: ACUTE PAIN;SURGICAL PAIN
TYPE: ACUTE PAIN;SURGICAL PAIN

## 2022-09-21 ASSESSMENT — ENCOUNTER SYMPTOMS
CHEST TIGHTNESS: 0
DIARRHEA: 0
COUGH: 0
SHORTNESS OF BREATH: 0
EYE REDNESS: 0
RHINORRHEA: 0
CONSTIPATION: 0
COLOR CHANGE: 0
ABDOMINAL PAIN: 0
EYE DISCHARGE: 0
VOMITING: 0
SORE THROAT: 0
NAUSEA: 0

## 2022-09-21 ASSESSMENT — PAIN DESCRIPTION - LOCATION
LOCATION: LEG

## 2022-09-21 ASSESSMENT — PAIN DESCRIPTION - ORIENTATION
ORIENTATION: LOWER;LEFT
ORIENTATION: LOWER;LEFT
ORIENTATION: LEFT;LOWER

## 2022-09-21 ASSESSMENT — PAIN DESCRIPTION - DESCRIPTORS
DESCRIPTORS: PATIENT UNABLE TO DESCRIBE
DESCRIPTORS: PATIENT UNABLE TO DESCRIBE

## 2022-09-21 ASSESSMENT — PAIN DESCRIPTION - FREQUENCY
FREQUENCY: CONTINUOUS
FREQUENCY: CONTINUOUS

## 2022-09-21 ASSESSMENT — PAIN SCALES - GENERAL
PAINLEVEL_OUTOF10: 4
PAINLEVEL_OUTOF10: 7
PAINLEVEL_OUTOF10: 5
PAINLEVEL_OUTOF10: 7
PAINLEVEL_OUTOF10: 7

## 2022-09-21 ASSESSMENT — PAIN - FUNCTIONAL ASSESSMENT
PAIN_FUNCTIONAL_ASSESSMENT: PREVENTS OR INTERFERES SOME ACTIVE ACTIVITIES AND ADLS

## 2022-09-21 ASSESSMENT — PAIN DESCRIPTION - ONSET
ONSET: ON-GOING
ONSET: ON-GOING

## 2022-09-21 NOTE — CARE COORDINATION
Case Management Initial Discharge Plan  Mali Reagan,         Readmission Risk              Risk of Unplanned Readmission:  0             Met with:family member patient and partner to discuss discharge plans. Information verified: address, contacts, phone number, , insurance Yes  PCP: Vitaliy Alex DO  Date of last visit: 1 year     Insurance Provider: Ramona     Discharge Planning  Current Residence:  home   Living Arrangements:  lives with partner   Home has 1 stories/1 stairs to climb  Support Systems:  partner and brother   Current Services PTA:    Supplier: n/a  Patient able to perform ADL's:Independent  DME used to aid ambulation prior to admission: crutches /during admission none    Potential Assistance Needed:  TBD     Pharmacy: [facility] Medications:     Does patient want to participate in local refill/ meds to beds program?       Patient agreeable to home care: Unsure   [place] of choice provided:  n/a      Type of Home Care Services:  n/a (if needing HC, Renella Boeck can assist with behavioral health)  Patient expects to be discharged to:  home     Prior SNF/Rehab Placement and Facility: [facility]   Agreeable to SNF/Rehab: No  [place] of choice provided: n/a   Evaluation: yes    Expected Discharge date:  TBD  Follow Up Appointment: Best Day/ Time:      Transportation provider: bel   Transportation arrangements needed for discharge: No     Discharge Plan:   Patient sleeping, spoke with partner at bedside. Partner reports that patient has a hx of IV drug use and suspects that he has been using again on top of pain meds given due to leg surgery. Patient only family in the area is his brother. Does not have a relationship with anyone else. Has been to [address] for physical rehab in the past. Will think about [address]. Patient on IV antibiotics at this time.    Partner states that patient is not welcome at home unless agreeable to getting into substance abuse rehab and counseling. Follow for plan.          Electronically signed by JESSICA Pride on 9/21/22 at 11:07 AM EDT

## 2022-09-21 NOTE — PROGRESS NOTES
Pt admitted to 1105 from ER due to hypotension. Pt drowsy upon admission but does awaken easily to verbal stimuli. Assessment done, call light in reach and pt informed he is at [facility] in the ICU which pt got very angry he is here at [facility] and states he would rather be at [facility].  Caty Warner NP messaged of request.

## 2022-09-21 NOTE — ED PROVIDER NOTES
EMERGENCY DEPARTMENT ENCOUNTER    Pt Name: Berna Lewis  MRN: [id number]  [place] 1980  Date of evaluation: 9/21/22  CHIEF COMPLAINT       Chief Complaint   Patient presents with    Drug Overdose     HISTORY OF PRESENT ILLNESS   Is a 68-year-old male that presents with complaints of an opiate overdose. Patient was found unresponsive by his partner this morning, he was given Narcan by EMS and brought in for further evaluation. On my evaluation the patient denies any complaints except for feeling cold. He had a recent surgery for a nonunion of a left tibial fracture. The patient describes his symptoms as severe, he denies any suicidal or homicidal ideation, he has no chest pain, denies shortness of breath nausea or vomiting. REVIEW OF SYSTEMS     Review of Systems   Constitutional:  Positive for chills. Negative for fever. HENT:  Negative for rhinorrhea and sore throat. Eyes:  Negative for discharge, redness and visual disturbance. Respiratory:  Negative for cough and shortness of breath. Cardiovascular:  Negative for chest pain, palpitations and leg swelling. Gastrointestinal:  Negative for diarrhea, nausea and vomiting. Genitourinary:  Negative for dysuria. Musculoskeletal:  Negative for arthralgias, myalgias and neck pain. Skin:  Negative for color change and rash. Neurological:  Negative for seizures, weakness and headaches. Psychiatric/Behavioral:  Negative for hallucinations, self-injury and suicidal ideas.     PASTMEDICAL HISTORY     Past Medical History:   Diagnosis Date    Acute asthma exacerbation 10/10/2013    Acute kidney injury ([facility] [address] 11/18/2013    ADD (attention deficit disorder with hyperactivity)     Alcohol abuse 11/22/2013    Anxiety 11/22/2013    Anxiety and depression     Bipolar disorder ([address]     Hypertension     borderline    Hypertension 10/21/2020     Past Problem List  Patient Active Problem List   Diagnosis Code    Current every day smoker F17.200 mouth 3 times daily    LISINOPRIL (PRINIVIL;ZESTRIL) 10 MG TABLET    take 1 tablet by mouth once daily    LISINOPRIL (PRINIVIL;ZESTRIL) 20 MG TABLET    Take 1 tablet by mouth daily    NARCAN 4 MG/0.1ML LIQD NASAL SPRAY    ADMINISTER 1 spray into each nostril if needed for OPIOID REVERSAL OR RESPIRATORY DEPRESSION    ONDANSETRON (ZOFRAN) 4 MG TABLET    Take 4 mg by mouth every 8 hours as needed for Nausea or Vomiting     ALLERGIES     has No Known Allergies. FAMILY HISTORY     He indicated that his mother is . SOCIAL HISTORY       Social History     Tobacco Use    Smoking status: Heavy Smoker     Packs/day: 1.50     Years: 21.00     Pack years: 31.50     Types: Cigarettes    Smokeless tobacco: Never    Tobacco comments:     1.5 every 2 days   Vaping Use    Vaping Use: Never used   Substance Use Topics    Alcohol use: Yes     Comment: social     Drug use: Yes     Types: Opiates      PHYSICAL EXAM     INITIAL VITALS: BP (!) 87/59   Pulse (!) 103   Temp 97.7 °F (36.5 °C) (Oral)   Resp 14   Ht 5' 10\" (1.778 m)   Wt 143 lb (64.9 kg)   SpO2 95%   BMI 20.52 kg/m²    Physical Exam  Constitutional:       Appearance: Normal appearance. He is well-developed. He is ill-appearing. He is not toxic-appearing. HENT:      Head: Normocephalic and atraumatic. Eyes:      Conjunctiva/sclera: Conjunctivae normal.      Pupils: Pupils are equal, round, and reactive to light. Neck:      Trachea: Trachea normal.   Cardiovascular:      Rate and Rhythm: Regular rhythm. Tachycardia present. Heart sounds: S1 normal and S2 normal. No murmur heard. Pulmonary:      Effort: Pulmonary effort is normal. No accessory muscle usage or respiratory distress. Breath sounds: Normal breath sounds. Chest:      Chest wall: No deformity or tenderness. Abdominal:      General: Bowel sounds are normal. There is no distension or abdominal bruit. Palpations: Abdomen is not rigid. Tenderness:  There is no abdominal tenderness. There is no guarding or rebound. Musculoskeletal:      Cervical back: Normal range of motion and neck supple. Legs:    Skin:     General: Skin is warm. Findings: No rash. Neurological:      Mental Status: He is oriented to person, place, and time. He is lethargic. GCS: GCS eye subscore is 4. GCS verbal subscore is 5. GCS motor subscore is 6. Psychiatric:         Speech: Speech normal.       MEDICAL DECISION MAKING:   This is a 61-year-old male with a history of opiate abuse that presents with complaints of generalized weakness, possible opiate overdose. He was given some Narcan and brought in for further evaluation. On exam the patient is tachycardic and hypotensive, continues to be somewhat altered, plan is basic labs IV fluids and reevaluation. 9:38 AM EDT  Patient's laboratory studies are consistent with sepsis, his CT scan shows no evidence of pulmonary embolism but does show bibasilar pneumonia, patient will be started on antibiotics, discussed with hospitalist service for admission. He is receiving his second fluid bolus to finish the 30/kg bolus, his blood pressure is improving. 1)  Sepsis Identified at Time:      9:39 AM EDT         2)  Sepsis Alert Protocol Guidelines:  Blood Cultures drawn before antibiotics  Broad Spectrum Antibiotics given stat   Lactic Acid Q2 hours times 2 occurrences (if first lactic acid > 2.0)    3)  Fluid Bolus Guidelines:    If lactate > 4.0   OR   MAP less than 65  OR  systolic BP < 90 (two separate readings),            then 30ml/kg crystalloid fluid bolus infused, and may give over 4 hour duration. Is the patient Obese (BMI > 30): No    Body mass index is 20.52 kg/m². Ideal body weight: 73 kg (160 lb 15 oz)    If Obese the Ideal Body  ([place] formula):   Ideal body weight (IBW) (men) = 50 kg + 2.3 kg x (height, inches - 60)   Ideal body weight (IBW) (women) = 45.5 kg + 2.3 kg x (height, inches - 60)    4)  Repeat Sepsis Exam completed during fluid bolus at time:     not yet      5)  Vasopressors: For persistent hypotension after completion of 30ml/kg fluid bolus (need to measure BP Q 15 min in the hour after completion of fluid bolus). Discuss risks and benefits of vasopressors and alternative therapies with patient and/or DPOA. CRITICAL CARE:       PROCEDURES:    Critical Care    Date/Time: 9/21/2022 9:54 AM  Performed by: Jayesh Walker MD  Authorized by: Zain Love DO     Critical care provider statement:     Critical care time (minutes):  36    Critical care time was exclusive of:  Separately billable procedures and treating other patients and teaching time    Critical care was necessary to treat or prevent imminent or life-threatening deterioration of the following conditions:  Shock and sepsis    Critical care was time spent personally by me on the following activities:  Evaluation of patient's response to treatment, examination of patient, ordering and review of laboratory studies, ordering and review of radiographic studies and re-evaluation of patient's condition    DIAGNOSTIC RESULTS   EKG:All EKG's are interpreted by the Emergency Department Physician who either signs or Co-signs this chart in the absence of a cardiologist.    Patient's EKG shows sinus tachycardia with a rate of 117, NC QRS QTC intervals unremarkable patient has normal axis no ST elevations or depressions, no significant T wave changes. Nonspecific EKG. RADIOLOGY:All plain film, CT, MRI, and formal ultrasound images (except ED bedside ultrasound) are read by the radiologist, see reports below, unless otherwisenoted in MDM or here. CT CHEST PULMONARY EMBOLISM W CONTRAST   Final Result   No evidence of pulmonary embolism. Bibasilar pneumonia, slightly greater on the right. Distended stomach with residual fluid/debris or food. Additional findings, as above.       RECOMMENDATIONS:   Unavailable         XR bolus    iopamidol (ISOVUE-370) 76 % injection 75 mL    sodium chloride flush 0.9 % injection 10 mL    DISCONTD: dextrose 50 % IV solution    0.9 % sodium chloride bolus    DISCONTD: dextrose bolus 10% 250 mL    dextrose bolus 10% 250 mL    cefTRIAXone (ROCEPHIN) 1,000 mg in dextrose 5 % 50 mL IVPB mini-bag     Order Specific Question:   Antimicrobial Indications     Answer:   Pneumonia (CAP)    azithromycin (ZITHROMAX) 500 mg in D5W 250ml addavial     Order Specific Question:   Antimicrobial Indications     Answer:   Pneumonia (CAP)     CONSULTS:  IP CONSULT TO HOSPITALIST  IP CONSULT TO ORTHOPEDIC SURGERY    FINAL IMPRESSION      1. Septicemia ([facility] [address]    2. Pneumonia of both lower lobes due to infectious organism    3. Lactic acidosis    4. Opiate overdose, accidental or unintentional, initial encounter ([facility] [address]    5. Hypoglycemia          DISPOSITION/PLAN   DISPOSITION Admitted 09/21/2022 09:41:40 AM      PATIENT REFERRED TO:  No follow-up provider specified. DISCHARGE MEDICATIONS:  New Prescriptions    No medications on file     The care is provided during an unprecedented national emergency due to the novel coronavirus, COVID 19.   MD Dustin Villegas MD  09/21/22 [id number]

## 2022-09-21 NOTE — PROGRESS NOTES
Writer called Dr Guerra [facility] office ([phone]) and PA states he had surgery 9/13/2022 and is due for follow up in office next Thursday the 29th to take out staples etc. Pt is weight bearing as tolerated and should have a simple dry dressing with abd pads covered with ace wrap to hold it in place. If there are any other problems he can follow up sooner.

## 2022-09-21 NOTE — H&P
[facility]  Office: [address], DO, Vinnytemitope Jay, DO, Barbara Hernandez, DO, Salima Wood Blood, DO, Spike Truong MD, Joseph Real MD, Cecilia Hernandez MD, Rosie Perez MD,  Ale Jacobson MD, Thomas Maynard MD, Eunice Peterson, DO, Saran Crespo MD,  Williams Carrington MD, Sulema Ferrer MD, Chelsey Laws, DO, Saba Roca MD, Juan Pablo Burgess MD, Patricia Curry MD, Alexus Rea MD, Hector Radford MD, Jenn Bey MD, Bam Gillette DO, Rosie Curiel MD, Tami Callaway MD, Margo Gill, CNP,  Domenico Sotelo, CNP, Theo Wallis, CNP, Tori Bo, CNP,  Marielos Joseph, DNP, Rebecca Manning, CNP, Fili Mitchell, CNP, Amelie Nielsen, CNP, Bisi Rivera, CNP, Nico Alvarado, CNP, Nickolas Aguilar PA-C, William Myers, CNS, Genoveva Baer, [facility], Charles Miles, CNP, Conner Pollard, CNP, Tommy Cabrera, [facility]    HISTORY AND PHYSICAL EXAMINATION            Date:   9/21/2022  Patient name:  Adama Zhao  Date of admission:  9/21/2022  7:05 AM  MRN:   [id number]  Account:  [de-identified]  YOB: 1980  PCP:    Christianna Babinski, DO  Room:   [facility]/21  Code Status:    Full    Chief Complaint:     Chief Complaint   Patient presents with    Drug Overdose     History Obtained From:     patient, electronic medical record    History of Present Illness:     Patient presents to the emergency room today with complaints of an overdose. Per ED report. Patient's partner found the patient unresponsive this morning. Patient states that his left leg was hurting last night and he said that he only took 1 oxycodone prior to going to sleep. Per ED report, the patient's partner called EMS and they administered Narcan while at the house and patient subsequently woke up. Patient is currently alert and oriented but does not remember the events of this morning.   Patient continues to be very drowsy and his only complaint at this time is left leg pain and being thirsty. Patient denies any recent fevers, chills, chest pain, shortness of breath, nausea, vomiting or diarrhea. Patient has a significant past medical history of asthma, alcohol abuse, bipolar disorder, hypertension, anxiety, depression and drug abuse. Of note, patient recently underwent an osteotomy of his left tibia by Dr. Brynn Quiroga on 9/13/2022 at [facility] for a nonunion of a left tibial fracture. Patient states that he has not yet attended his follow-up appointment as it is not scheduled until 9/29/2022. Patient's left leg is notably edematous and has staples in place. Throughout the emergency room evaluation it was noted that the patient's creatinine level is 1.91. GFR 39. Lactic acid 4.1. Glucose 58. WBC 23.0. Hemoglobin 11.7. An x-ray of his left fibula/tibia was obtained which shows:   Status post mid tibia/mid distal fibular osteotomies and tibial ORIF;   hardware appears intact. Some associated soft tissue swelling noted, as   above. No soft tissue gas or radiopaque foreign body. CT chest to rule out PE was obtained which shows:   No evidence of pulmonary embolism. Bibasilar pneumonia, slightly greater on the right. Distended stomach with residual fluid/debris or food. Additional findings, as above.        Past Medical History:     Past Medical History:   Diagnosis Date    Acute asthma exacerbation 10/10/2013    Acute kidney injury ([address] 11/18/2013    ADD (attention deficit disorder with hyperactivity)     Alcohol abuse 11/22/2013    Anxiety 11/22/2013    Anxiety and depression     Bipolar disorder ([address]     Hypertension     borderline    Hypertension 10/21/2020        Past Surgical History:     Past Surgical History:   Procedure Laterality Date    ANTERIOR CRUCIATE LIGAMENT REPAIR      KNEE SURGERY      rt ACL repair 2011    KNEE SURGERY Right     MANDIBLE RECONSTRUCTION Bilateral 2004    MANDIBLE RECONSTRUCTION      SPLENECTOMY          Medications Prior to Admission:     Prior to Admission medications    Medication Sig Start Date End Date Taking? Authorizing Provider   lisinopril (PRINIVIL;ZESTRIL) 10 MG tablet take 1 tablet by mouth once daily 11/18/21   Juno Burn, DO   albuterol sulfate  (90 Base) MCG/ACT inhaler Inhale 2 puffs into the lungs every 4 hours as needed for Wheezing 10/28/21   Juno Burn, DO   lisinopril (PRINIVIL;ZESTRIL) 20 MG tablet Take 1 tablet by mouth daily 6/2/21   Juno Burn, DO   Blood Pressure KIT Check blood pressure daily as directed 5/4/21   Juno Burn, DO   NARCAN 4 MG/0.1ML LIQD nasal spray ADMINISTER 1 spray into each nostril if needed for OPIOID REVERSAL OR RESPIRATORY DEPRESSION 7/23/20   Historical Provider, MD   gabapentin (NEURONTIN) 800 MG tablet take 1 tablet by mouth three times a day 9/23/20   Historical Provider, MD   CALCITRATE 950 MG tablet take 2 tablets by mouth three times a day with meals 9/11/20   Historical Provider, MD        Allergies:     Patient has no known allergies. Social History:     Tobacco:    reports that he has been smoking cigarettes. He has a 31.50 pack-year smoking history. He has never used smokeless tobacco.  Alcohol:      reports current alcohol use. Drug Use:  reports current drug use. Drug: Opiates . Family History:     Family History   Problem Relation Age of Onset    Cancer Mother        Review of Systems:     Positive and Negative as described in HPI. Review of Systems   Constitutional:  Negative for activity change, chills, fatigue and fever. HENT:  Negative for congestion. Respiratory:  Negative for cough, chest tightness and shortness of breath. Cardiovascular: Negative. Gastrointestinal:  Negative for abdominal pain, constipation, diarrhea, nausea and vomiting. Endocrine: Negative for cold intolerance and polyuria.    Genitourinary:  Negative for difficulty normal.         Thought Content:  Thought content normal.         Judgment: Judgment normal.         Investigations:      Laboratory Testing:  Recent Results (from the past 24 hour(s))   EKG 12 Lead    Collection Time: 09/21/22  7:05 AM   Result Value Ref Range    Ventricular Rate 117 BPM    Atrial Rate 117 BPM    P-R Interval 126 ms    QRS Duration 90 ms    Q-T Interval 352 ms    QTc Calculation (Bazett) 491 ms    P Axis 71 degrees    R Axis 66 degrees    T Axis 69 degrees   Basic Metabolic Panel    Collection Time: 09/21/22  7:45 AM   Result Value Ref Range    Glucose 58 (L) 70 - 99 mg/dL    BUN 18 6 - 20 mg/dL    Creatinine 1.91 (H) 0.70 - 1.20 mg/dL    Bun/Cre Ratio 9 9 - 20    Calcium 9.7 8.6 - 10.4 mg/dL    Sodium 142 135 - 144 mmol/L    Potassium 4.3 3.7 - 5.3 mmol/L    Chloride 100 98 - 107 mmol/L    CO2 26 20 - 31 mmol/L    Anion Gap 16 9 - 17 mmol/L    GFR Non-African American 39 (L) >60 mL/min    GFR  48 (L) >60 mL/min    GFR Comment         CBC with Auto Differential    Collection Time: 09/21/22  7:45 AM   Result Value Ref Range    WBC 23.0 (H) 3.5 - 11.3 k/uL    RBC 3.88 (L) 4.21 - 5.77 m/uL    Hemoglobin 11.7 (L) 13.0 - 17.0 g/dL    Hematocrit 36.1 (L) 40.7 - 50.3 %    MCV 93.0 82.6 - 102.9 fL    MCH 30.2 25.2 - 33.5 pg    MCHC 32.4 28.4 - 34.8 g/dL    RDW 13.7 11.8 - 14.4 %    Platelets 713 482 - 833 k/uL    MPV 8.9 8.1 - 13.5 fL    NRBC Automated 0.0 0.0 per 100 WBC    Seg Neutrophils 82 (H) 36 - 65 %    Lymphocytes 7 (L) 24 - 43 %    Monocytes 10 3 - 12 %    Eosinophils % 0 (L) 1 - 4 %    Basophils 0 0 - 2 %    Immature Granulocytes 1 (H) 0 %    Segs Absolute 18.86 (H) 1.50 - 8.10 k/uL    Absolute Lymph # 1.61 1.10 - 3.70 k/uL    Absolute Mono # 2.30 (H) 0.10 - 1.20 k/uL    Absolute Eos # 0.00 0.00 - 0.44 k/uL    Basophils Absolute 0.00 0.00 - 0.20 k/uL    Absolute Immature Granulocyte 0.23 0.00 - 0.30 k/uL   Hepatic Function Panel    Collection Time: 09/21/22  7:45 AM   Result Value Ref Range    Albumin 4.3 3.5 - 5.2 g/dL    Alkaline Phosphatase 95 40 - 129 U/L    ALT 31 5 - 41 U/L    AST 37 <40 U/L    Total Bilirubin 0.2 (L) 0.3 - 1.2 mg/dL    Bilirubin, Direct <0.1 <0.31 mg/dL    Bilirubin, Indirect Can not be calculated 0.00 - 1.00 mg/dL    Total Protein 7.8 6.4 - 8.3 g/dL   Lipase    Collection Time: 09/21/22  7:45 AM   Result Value Ref Range    Lipase 39 13 - 60 U/L   Lactate, Sepsis    Collection Time: 09/21/22  7:45 AM   Result Value Ref Range    Lactic Acid, Sepsis 4.1 (H) 0.5 - 1.9 mmol/L   Culture, Blood 1    Collection Time: 09/21/22  7:45 AM    Specimen: Blood   Result Value Ref Range    Specimen Description . BLOOD     Special Requests 11ML RT FOREARM     Culture NO GROWTH <24 HRS    Culture, Blood 1    Collection Time: 09/21/22  8:22 AM    Specimen: Blood   Result Value Ref Range    Specimen Description . BLOOD     Special Requests LAC 10ML     Culture NO GROWTH <24 HRS    POC Glucose Fingerstick    Collection Time: 09/21/22  9:49 AM   Result Value Ref Range    POC Glucose 106 75 - 110 mg/dL       Imaging/Diagnostics:  XR TIBIA FIBULA LEFT (2 VIEWS)    Result Date: 9/21/2022  Status post mid tibia/mid distal fibular osteotomies and tibial ORIF; hardware appears intact. Some associated soft tissue swelling noted, as above. No soft tissue gas or radiopaque foreign body. CT CHEST PULMONARY EMBOLISM W CONTRAST    Result Date: 9/21/2022  No evidence of pulmonary embolism. Bibasilar pneumonia, slightly greater on the right. Distended stomach with residual fluid/debris or food. Additional findings, as above.  RECOMMENDATIONS: Unavailable       Assessment :      Hospital Problems             Last Modified POA    * (Principal) Sepsis ([address] 9/21/2022 Yes    Closed displaced comminuted fracture of shaft of left tibia with malunion 9/21/2022 Yes    Hypotension 9/21/2022 Yes    Hypoglycemia 9/21/2022 Yes    History of substance abuse ([address] 9/21/2022 Yes    Mild intermittent asthma without complication 6/63/0611 Yes    PROMISE (acute kidney injury) ([facility] 9/21/2022 Yes    Pneumonia of both lower lobes 9/21/2022 Yes       Plan:     Patient status inpatient in the  Medical ICU    Sepsis  IV fluid bolus given in ED  IV Rocephin and azithromycin ordered  IV hydration  As needed Norco  Recent left leg surgery  Continue aspirin  Orthopedic consult  PT/OT  Hypotension  IV hydration  Hold home antihypertensives at this time  IR to place central line ordered per ED  Vasopressor ordered as needed  Hypoglycemia  Monitor blood sugar levels every 4 hours  Hypoglycemia protocol entered  Obtain orthostatic blood pressures  Asthma  Continue home medications  PROMISE  IV hydration  Bladder scan  Monitor urine studies  Renal ultrasound  Pneumonia  IV Rocephin and azithromycin ordered  Encourage cough and deep breathing  Apply supplemental O2 as needed  Continuous pulse oximetry monitoring  Obtain sputum cultures  Chest x-ray in the morning  Adult diet  Monitor AM labs    Consultations:   IP CONSULT TO HOSPITALIST  IP CONSULT TO ORTHOPEDIC SURGERY    Patient is admitted as inpatient status because of co-morbidities listed above, severity of signs and symptoms as outlined, requirement for current medical therapies and most importantly because of direct risk to patient if care not provided in a hospital setting. Expected length of stay > 48 hours. On this date 9/21/2022 I have spent 31 minutes reviewing previous notes, test results and face to face with the patient discussing the diagnosis and importance of compliance with the treatment plan as well as documenting on the day of the visit. At least 50% of the time documented was spent with the patient to provide counseling and/or coordination of care.       AMY Pedro - CNP  9/21/2022  10:14 AM    Copy sent to Dr. Nnamdi Greenberg DO

## 2022-09-21 NOTE — ED NOTES
Pt presents to ed via ems c/o drug overdose. He was found unresponsive by his partner. EMS gave Narcan and pt woke up. He is alert and oriented upon arrival, hypotensive. He had recent revision surgery on his left leg from fracture a couple years ago. He said he took an oxy that he had left because he could not find anything else for pain. Dressing removed from left leg, dried blood and drainage noted on bandage.       Lucero Mosqueda RN  09/21/22 9317

## 2022-09-21 NOTE — PROGRESS NOTES
End Of Shift Note  [address] ICU  Summary of shift: Pt rests quietly in bed easily wakes up to verbal stimuli and is sometimes agitated and angry and other times pleasant and appreciative of care. Pt had requested to go to [facility] and Dr Mariano Pierre recommended to transfer to [facility] when they are off bypass as pt had surgery there 9/13/2022 with Dr Celsa Aguilar. Dr Earlene Bright updated pt and  this evening that [facility] accepted pt just awaiting bed availability. Vitals:    Vitals:    09/21/22 1530 09/21/22 1600 09/21/22 1630 09/21/22 1700   BP: 114/72 107/77 99/74 111/65   Pulse: 83 80 81 82   Resp: 11 11 14 11   Temp:  97.5 °F (36.4 °C)     TempSrc:  Oral     SpO2: 98% 98% 99% 98%   Weight:       Height:            I&O:   Intake/Output Summary (Last 24 hours) at 9/21/2022 1836  Last data filed at 9/21/2022 1825  Gross per 24 hour   Intake 1259.23 ml   Output 475 ml   Net 784.23 ml       Resp Status: RA, COVID negative.     Critical Care IV infusions:   sodium chloride 150 mL/hr at 09/21/22 1341    sodium chloride      norepinephrine      dextrose          LDA:   Peripheral IV 09/21/22 Right Forearm (Active)   Number of days: 0       Incision 09/21/22 Pretibial Left (Active)   Number of days: 0

## 2022-09-21 NOTE — PROGRESS NOTES
[address] [facility] Pharmacokinetic Monitoring Service - Vancomycin     Joy Hathaway is a 38 y.o. male starting on vancomycin therapy for pneumonia . Pharmacy consulted by Dr. Safia Duran  for monitoring and adjustment. Target Concentration: Goal AUC/MARYBETH 400-600 mg*hr/L    Additional Antimicrobials: Cefepime    Pertinent Laboratory Values: Wt Readings from Last 1 Encounters:   09/21/22 143 lb (64.9 kg)     Temp Readings from Last 1 Encounters:   09/21/22 98.1 °F (36.7 °C) (Oral)     Estimated Creatinine Clearance: 47 mL/min (A) (based on SCr of 1.91 mg/dL (H)). Recent Labs     09/21/22  0745   CREATININE 1.91*   WBC 23.0*     Procalcitonin: none      MRSA Nasal Swab: not ordered. Order placed by pharmacy.     Plan:  Dosing recommendations based on Bayesian software  Start vancomycin 1500mg IV x 1 , then 500mg IV q 12 hours  Anticipated AUC of 453 and trough concentration of 16 ug/ml  at steady state  Renal labs as indicated   Vancomycin concentration ordered for 9/22 @ 0600   Pharmacy will continue to monitor patient and adjust therapy as indicated    Thank you for the consult,  Nhung Rosales, [address] [place] [address]  9/21/2022 2:15 PM

## 2022-09-21 NOTE — CONSULTS
Pulmonary Medicine and [address] Amirah Suárez MD      Patient - Lexie Reynolds   MRN -  [id number]   [place] # - [de-identified]   - 1980      Date of Admission -  2022  7:05 AM  Date of evaluation -  2022  Room - [address] [place] [address]  Primary Care Physician - Jamin Vasquez DO     Reason for Consult    OD/change in mental status    Assessment   OD: Amphetamine, fentanyl, oxycodone, benzo  Asthma  Anxiety/bipolar disorder  Recent left tibia-fibula ORIF  Bibasilar infiltrate,? Aspiration/leukocytosis  PROMISE  Hypotension secondary to above    Recommendations   Continue IV antibiotic, cefepime/vancomycin  IV fluids  Albuterol and Ipratropium Q 4 hours prn  X-ray chest in am  Labs: CBC and BMP in am  Levophed as needed for MAP 65-75 mmHg  DVT prophylaxis with SQ heparin  Start orals once more awake  Pain control  Ortho input  Will follow with you    HPI     Lexie Reynolds is 38 y.o.,  male, admitted because of overdose. Patient partner found patient unresponsive this morning. Per patient he has left leg was hurting last night and he took extra oxycodone prior to going to sleep. Patient denies any chest pain, cough, shortness of breath at this time. He denies any abdominal pain, nausea or vomiting. He is complaining of left leg pain which is chronic in nature. He is an active smoker.     PMHx   Past Medical History      Diagnosis Date    Acute asthma exacerbation 10/10/2013    Acute kidney injury ([address] 2013    ADD (attention deficit disorder with hyperactivity)     Alcohol abuse 2013    Anxiety 2013    Anxiety and depression     Bipolar disorder ([address]     Hypertension     borderline    Hypertension 10/21/2020      Past Surgical History        Procedure Laterality Date    ANTERIOR CRUCIATE LIGAMENT REPAIR      KNEE SURGERY      rt ACL repair 2011    KNEE SURGERY Right     MANDIBLE RECONSTRUCTION Bilateral  Substance Use Topics    Alcohol use: Yes     Comment: social      Family History          Problem Relation Age of Onset    Cancer Mother      ROS - 6 systems   Patient is lethargic and unable to get detailed review of system  Vitals     height is 5' 9\" (1.753 m) and weight is 143 lb (64.9 kg). His oral temperature is 98.1 °F (36.7 °C). His blood pressure is 102/69 and his pulse is 98. His respiration is 18 and oxygen saturation is 97%. Body mass index is 21.12 kg/m². I/O    No intake or output data in the 24 hours ending 09/21/22 1433  No intake/output data recorded. Patient Vitals for the past 96 hrs (Last 3 readings):   Weight   09/21/22 0714 143 lb (64.9 kg)     Exam   General Appearance lethargic but wakes up and responds to question, in no acute distress  HEENT - Head is normocephalic, atraumatic. Pupil reactive to light  Neck - Supple, symmetrical, trachea midline and Soft, trachea midline and straight  Lungs -no wheezes, moderate air exchange  Cardiovascular - Heart sounds are normal.  Regular rhythm normal rate without murmur, gallop or rub. Abdomen - Soft, nontender, nondistended, no masses or organomegaly  Neurologic - CN II-XII are grossly intact.  There are no focal motor or sensory deficits  Skin - No bruising or bleeding  Extremities - No cyanosis, clubbing or edema, boot on his left foot/shin    Labs  - Old records and notes have been reviewed in [facility]   CBC     Lab Results   Component Value Date/Time    WBC 23.0 09/21/2022 07:45 AM    RBC 3.88 09/21/2022 07:45 AM    RBC 4.94 01/09/2012 12:07 PM    HGB 11.7 09/21/2022 07:45 AM    HCT 36.1 09/21/2022 07:45 AM     09/21/2022 07:45 AM     01/09/2012 12:07 PM    MCV 93.0 09/21/2022 07:45 AM    MCH 30.2 09/21/2022 07:45 AM    MCHC 32.4 09/21/2022 07:45 AM    RDW 13.7 09/21/2022 07:45 AM    NRBC 3 09/07/2019 05:19 AM    LYMPHOPCT 7 09/21/2022 07:45 AM    MONOPCT 10 09/21/2022 07:45 AM    BASOPCT 0 09/21/2022 07:45 AM    MONOSABS 2.30 09/21/2022 07:45 AM    LYMPHSABS 1.61 09/21/2022 07:45 AM    EOSABS 0.00 09/21/2022 07:45 AM    BASOSABS 0.00 09/21/2022 07:45 AM    DIFFTYPE NOT REPORTED 06/02/2021 01:20 PM     BMP   Lab Results   Component Value Date/Time     09/21/2022 07:45 AM    K 4.3 09/21/2022 07:45 AM     09/21/2022 07:45 AM    CO2 26 09/21/2022 07:45 AM    BUN 18 09/21/2022 07:45 AM    CREATININE 1.91 09/21/2022 07:45 AM    GLUCOSE 58 09/21/2022 07:45 AM    GLUCOSE 106 01/09/2012 12:07 PM    CALCIUM 9.7 09/21/2022 07:45 AM    MG 1.8 09/10/2019 05:12 AM     LFTS  Lab Results   Component Value Date/Time    ALKPHOS 95 09/21/2022 07:45 AM    ALT 31 09/21/2022 07:45 AM    AST 37 09/21/2022 07:45 AM    PROT 7.8 09/21/2022 07:45 AM    BILITOT 0.2 09/21/2022 07:45 AM    BILIDIR <0.1 09/21/2022 07:45 AM    IBILI Can not be calculated 09/21/2022 07:45 AM    LABALBU 4.3 09/21/2022 07:45 AM    LABALBU 4.8 01/09/2012 12:07 PM     ABG   No results found for: PHART, OUY1BDX, PO2ART, AOR3ETF, BEART, THGBART, JVZ3LKW, Q2ZYPDKW  PTT  Lab Results   Component Value Date    APTT 28.8 09/06/2019     INR   Lab Results   Component Value Date    INR 1.1 09/06/2019    INR 0.9 09/05/2019    INR 0.9 11/05/2013    PROTIME 11.9 09/06/2019    PROTIME 9.8 09/05/2019    PROTIME 10.3 11/05/2013       Radiology    CT Scans  CT scan of the chest: 9/21/2022: No PE. Positive small bibasilar infiltrate    (See actual reports for details)    \"Thank you for asking us to see this patient\"    Case discussed with nurse and patient/family. Questions and concerns addressed.     Electronically signed by     Abraham Lima MD on 9/21/2022 at 2:33 PM  Pulmonary Critical Care and Sleep Medicine,  [facility]  Cell: [phone]  Office: [phone]

## 2022-09-21 NOTE — FLOWSHEET NOTE
Spoke with Sasha Wagner RN enquiring if patient still needs CVD placement as patient is not on Levophed has a patent well infusing peripheral IV that IVPB antibiotics can infuse through.   She states she will have to check with MD.

## 2022-09-22 ENCOUNTER — APPOINTMENT (OUTPATIENT)
Dept: GENERAL RADIOLOGY | Age: 42
DRG: 720 | End: 2022-09-22
Payer: COMMERCIAL

## 2022-09-22 VITALS
RESPIRATION RATE: 13 BRPM | SYSTOLIC BLOOD PRESSURE: 126 MMHG | HEART RATE: 98 BPM | OXYGEN SATURATION: 96 % | BODY MASS INDEX: 23.51 KG/M2 | WEIGHT: 158.73 LBS | DIASTOLIC BLOOD PRESSURE: 72 MMHG | HEIGHT: 69 IN | TEMPERATURE: 97.9 F

## 2022-09-22 LAB
ABSOLUTE EOS #: 0.21 K/UL (ref 0–0.44)
ABSOLUTE IMMATURE GRANULOCYTE: 0.05 K/UL (ref 0–0.3)
ABSOLUTE LYMPH #: 4.05 K/UL (ref 1.1–3.7)
ABSOLUTE MONO #: 0.95 K/UL (ref 0.1–1.2)
ALBUMIN SERPL-MCNC: 3.3 G/DL (ref 3.5–5.2)
ALP BLD-CCNC: 70 U/L (ref 40–129)
ALT SERPL-CCNC: 21 U/L (ref 5–41)
ANION GAP SERPL CALCULATED.3IONS-SCNC: 5 MMOL/L (ref 9–17)
AST SERPL-CCNC: 18 U/L
BASOPHILS # BLD: 1 % (ref 0–2)
BASOPHILS ABSOLUTE: 0.07 K/UL (ref 0–0.2)
BILIRUB SERPL-MCNC: 0.1 MG/DL (ref 0.3–1.2)
BUN BLDV-MCNC: 7 MG/DL (ref 6–20)
BUN/CREAT BLD: 9 (ref 9–20)
CALCIUM SERPL-MCNC: 8.9 MG/DL (ref 8.6–10.4)
CHLORIDE BLD-SCNC: 103 MMOL/L (ref 98–107)
CO2: 29 MMOL/L (ref 20–31)
CREAT SERPL-MCNC: 0.74 MG/DL (ref 0.7–1.2)
EOSINOPHILS RELATIVE PERCENT: 1 % (ref 1–4)
GFR AFRICAN AMERICAN: >60 ML/MIN
GFR NON-AFRICAN AMERICAN: >60 ML/MIN
GFR SERPL CREATININE-BSD FRML MDRD: ABNORMAL ML/MIN/{1.73_M2}
GLUCOSE BLD-MCNC: 111 MG/DL (ref 75–110)
GLUCOSE BLD-MCNC: 120 MG/DL (ref 75–110)
GLUCOSE BLD-MCNC: 128 MG/DL (ref 70–99)
HCT VFR BLD CALC: 26.7 % (ref 40.7–50.3)
HCT VFR BLD CALC: 27.1 % (ref 40.7–50.3)
HEMOGLOBIN: 8.7 G/DL (ref 13–17)
HEMOGLOBIN: 8.7 G/DL (ref 13–17)
IMMATURE GRANULOCYTES: 0 %
LACTIC ACID: 0.7 MMOL/L (ref 0.5–2.2)
LYMPHOCYTES # BLD: 28 % (ref 24–43)
MAGNESIUM: 1.8 MG/DL (ref 1.6–2.6)
MCH RBC QN AUTO: 30.2 PG (ref 25.2–33.5)
MCHC RBC AUTO-ENTMCNC: 32.1 G/DL (ref 28.4–34.8)
MCV RBC AUTO: 94.1 FL (ref 82.6–102.9)
MONOCYTES # BLD: 7 % (ref 3–12)
MRSA, DNA, NASAL: NEGATIVE
NRBC AUTOMATED: 0 PER 100 WBC
PDW BLD-RTO: 14.3 % (ref 11.8–14.4)
PLATELET # BLD: 359 K/UL (ref 138–453)
PMV BLD AUTO: 9 FL (ref 8.1–13.5)
POTASSIUM SERPL-SCNC: 4.2 MMOL/L (ref 3.7–5.3)
RBC # BLD: 2.88 M/UL (ref 4.21–5.77)
SEG NEUTROPHILS: 64 % (ref 36–65)
SEGMENTED NEUTROPHILS ABSOLUTE COUNT: 9.27 K/UL (ref 1.5–8.1)
SODIUM BLD-SCNC: 137 MMOL/L (ref 135–144)
SPECIMEN DESCRIPTION: NORMAL
TOTAL PROTEIN: 5.9 G/DL (ref 6.4–8.3)
VANCOMYCIN RANDOM: 12.2 UG/ML
WBC # BLD: 14.6 K/UL (ref 3.5–11.3)

## 2022-09-22 PROCEDURE — 83605 ASSAY OF LACTIC ACID: CPT

## 2022-09-22 PROCEDURE — 83735 ASSAY OF MAGNESIUM: CPT

## 2022-09-22 PROCEDURE — 80202 ASSAY OF VANCOMYCIN: CPT

## 2022-09-22 PROCEDURE — 85014 HEMATOCRIT: CPT

## 2022-09-22 PROCEDURE — 6360000002 HC RX W HCPCS: Performed by: NURSE PRACTITIONER

## 2022-09-22 PROCEDURE — 82947 ASSAY GLUCOSE BLOOD QUANT: CPT

## 2022-09-22 PROCEDURE — 80053 COMPREHEN METABOLIC PANEL: CPT

## 2022-09-22 PROCEDURE — 6370000000 HC RX 637 (ALT 250 FOR IP): Performed by: INTERNAL MEDICINE

## 2022-09-22 PROCEDURE — 85018 HEMOGLOBIN: CPT

## 2022-09-22 PROCEDURE — 99239 HOSP IP/OBS DSCHRG MGMT >30: CPT | Performed by: INTERNAL MEDICINE

## 2022-09-22 PROCEDURE — 6370000000 HC RX 637 (ALT 250 FOR IP): Performed by: NURSE PRACTITIONER

## 2022-09-22 PROCEDURE — 36415 COLL VENOUS BLD VENIPUNCTURE: CPT

## 2022-09-22 PROCEDURE — 85025 COMPLETE CBC W/AUTO DIFF WBC: CPT

## 2022-09-22 PROCEDURE — 6360000002 HC RX W HCPCS: Performed by: INTERNAL MEDICINE

## 2022-09-22 PROCEDURE — 2580000003 HC RX 258: Performed by: INTERNAL MEDICINE

## 2022-09-22 PROCEDURE — 71045 X-RAY EXAM CHEST 1 VIEW: CPT

## 2022-09-22 RX ADMIN — OXYCODONE 10 MG: 5 TABLET ORAL at 07:43

## 2022-09-22 RX ADMIN — DOCUSATE SODIUM 50MG AND SENNOSIDES 8.6MG 1 TABLET: 8.6; 5 TABLET, FILM COATED ORAL at 07:44

## 2022-09-22 RX ADMIN — OXYCODONE 10 MG: 5 TABLET ORAL at 02:41

## 2022-09-22 RX ADMIN — CEFEPIME 2000 MG: 2 INJECTION, POWDER, FOR SOLUTION INTRAVENOUS at 03:26

## 2022-09-22 RX ADMIN — SODIUM CHLORIDE: 9 INJECTION, SOLUTION INTRAVENOUS at 08:51

## 2022-09-22 RX ADMIN — HEPARIN SODIUM 5000 UNITS: 5000 INJECTION INTRAVENOUS; SUBCUTANEOUS at 05:36

## 2022-09-22 RX ADMIN — ASPIRIN 81 MG 81 MG: 81 TABLET ORAL at 07:44

## 2022-09-22 RX ADMIN — GABAPENTIN 800 MG: 400 CAPSULE ORAL at 07:50

## 2022-09-22 RX ADMIN — VANCOMYCIN HYDROCHLORIDE 500 MG: 500 INJECTION, POWDER, LYOPHILIZED, FOR SOLUTION INTRAVENOUS at 01:57

## 2022-09-22 ASSESSMENT — PAIN DESCRIPTION - FREQUENCY: FREQUENCY: CONTINUOUS

## 2022-09-22 ASSESSMENT — PAIN - FUNCTIONAL ASSESSMENT: PAIN_FUNCTIONAL_ASSESSMENT: PREVENTS OR INTERFERES SOME ACTIVE ACTIVITIES AND ADLS

## 2022-09-22 ASSESSMENT — PAIN SCALES - GENERAL
PAINLEVEL_OUTOF10: 5
PAINLEVEL_OUTOF10: 6
PAINLEVEL_OUTOF10: 6

## 2022-09-22 ASSESSMENT — PAIN DESCRIPTION - ORIENTATION: ORIENTATION: LEFT;LOWER

## 2022-09-22 ASSESSMENT — PAIN DESCRIPTION - ONSET: ONSET: ON-GOING

## 2022-09-22 ASSESSMENT — PAIN DESCRIPTION - PAIN TYPE: TYPE: ACUTE PAIN;SURGICAL PAIN

## 2022-09-22 ASSESSMENT — PAIN DESCRIPTION - DESCRIPTORS: DESCRIPTORS: NAGGING;ACHING

## 2022-09-22 ASSESSMENT — PAIN DESCRIPTION - LOCATION: LOCATION: LEG

## 2022-09-22 NOTE — PROGRESS NOTES
End Of Shift Note  [address] ICU  Summary of shift: Patient was resting comfortably most of shift. However, he was upset in the beginning and was complaining about being at [address]. He was a little hungry so I gave him a variety of snacks to chose from and he's been very friendly since. He did c/o of pain a couple times throughout, so I gave him some oxycodone (see MAR). [facility] called and stated they do not have a bed availed for tonight but they will possibly in the morning if someone gets discharged.     Vitals:    Vitals:    09/22/22 0100 09/22/22 0200 09/22/22 0300 09/22/22 0400   BP: 102/70 106/78 105/60 105/64   Pulse: 91 91 92 87   Resp: 13 27 17 10   Temp:       TempSrc:       SpO2: (!) 88% 99% 94% 95%   Weight:       Height:            I&O:   Intake/Output Summary (Last 24 hours) at 9/22/2022 0510  Last data filed at 9/22/2022 0344  Gross per 24 hour   Intake 2514.08 ml   Output 475 ml   Net 2039.08 ml       Resp Status: RA    Ventilator Settings:     / / /     Critical Care IV infusions:   sodium chloride Stopped (09/22/22 0326)    sodium chloride      norepinephrine      dextrose          LDA:   Peripheral IV 09/21/22 Right Forearm (Active)   Number of days: 0       Incision 09/21/22 Pretibial Left (Active)   Number of days: 1

## 2022-09-22 NOTE — PLAN OF CARE
Problem: Discharge Planning  Goal: Discharge to home or other facility with appropriate resources  9/22/2022 0414 by Josh Parker RN  Outcome: Progressing  Flowsheets (Taken 9/21/2022 1930)  Discharge to home or other facility with appropriate resources:   Identify barriers to discharge with patient and caregiver   Arrange for needed discharge resources and transportation as appropriate     Problem: Safety - Adult  Goal: Free from fall injury  9/22/2022 0414 by Josh Parker RN  Outcome: Progressing     Problem: ABCDS Injury Assessment  Goal: Absence of physical injury  9/22/2022 0414 by Josh Parker RN  Outcome: Progressing     Problem: Skin/Tissue Integrity  Goal: Absence of new skin breakdown  Description: 1. Monitor for areas of redness and/or skin breakdown  2. Assess vascular access sites hourly  3. Every 4-6 hours minimum:  Change oxygen saturation probe site  4. Every 4-6 hours:  If on nasal continuous positive airway pressure, respiratory therapy assess nares and determine need for appliance change or resting period.   9/22/2022 0414 by Josh Parker RN  Outcome: Progressing Cecilio Cedeño Other

## 2022-09-22 NOTE — PROGRESS NOTES
Pt notified he is going to [facility] room A630 this am with a planned  time of 0845. Pt called his  and informed him of transfer. Pt states he is excited to go there where his doctor is at.

## 2022-09-22 NOTE — PROGRESS NOTES
Occupational Therapy  [address]  Occupational Therapy Not Seen Note    Patient not available for Occupational Therapy due to:    [] Testing:    [] Hemodialysis    [x] Cancelled by RN: 9/22: RN Rigo reporting pt is being transferred to [facility] in ~15 minutes.      [] Refusal by Patient:    [] Surgery:     [] Intubation:     [] Pain Medication:    [] Sedation:     [] Spine Precautions :    [] Medical Instability:    [] Other:        Calvin Urbina, OT

## 2022-09-22 NOTE — PROGRESS NOTES
Brief report to [facility] with [facility] transporting pt to [facility] room A630. Attempted to call report to [facility] and [address] stated staff were busy.

## 2022-09-22 NOTE — PROGRESS NOTES
Physical Therapy  DATE: 2022    NAME: Garth Andrade  MRN: [id number]   : 1980    Patient not seen this date for Physical Therapy due to:      [x] Cancel by RN or physician due to:  EMILIANO Sierra reporting pt is being transferred to [facility] in ~15 minutes. [] Hemodialysis    [] Critical Lab Value Level     [] Blood transfusion in progress    [] Acute or unstable cardiovascular status   _MAP < 55 or more than >115  _HR < 40 or > 130    [] Acute or unstable pulmonary status   -FiO2 > 60%   _RR < 5 or >40    _O2 sats < 85%    [] Strict Bedrest    [] Off Unit for surgery or procedure    [] Off Unit for testing       [] Pending imaging to R/O fracture    [] Refusal by Patient      [] Other      [] PT being discontinued at this time. Patient independent. No further needs. [] PT being discontinued at this time as the patient has been transferred to hospice care. No further needs.       Wyona Landau, PT

## 2022-09-22 NOTE — PROGRESS NOTES
Report called to Efren Retana RN at [facility] and all questions answered.

## 2022-09-22 NOTE — PROGRESS NOTES
[address] [facility] Pharmacokinetic Monitoring Service - Vancomycin    Consulting Provider: Dr. Leesa Dandy   Indication: PNEUMONIA  Target Concentration: Goal AUC/MARYBETH 400-600 mg*hr/L  Day of Therapy: 2  Additional Antimicrobials: Cefepime    Pertinent Laboratory Values: Wt Readings from Last 1 Encounters:   09/21/22 143 lb (64.9 kg)     Temp Readings from Last 1 Encounters:   09/22/22 98.3 °F (36.8 °C) (Temporal)     Estimated Creatinine Clearance: 122 mL/min (based on SCr of 0.74 mg/dL). Recent Labs     09/21/22  0745 09/22/22  0619   CREATININE 1.91* 0.74   WBC 23.0* 14.6*     Procalcitonin: 1.25 (9/21)      MRSA Nasal Swab: was ordered by provider, awaiting results.     Recent vancomycin administrations                     vancomycin (VANCOCIN) 500 mg in dextrose 5 % 100 mL IVPB (mini-bag) (mg) 500 mg New Bag 09/22/22 0157    vancomycin (VANCOCIN) 1,500 mg in dextrose 5 % 250 mL IVPB (mg) 1,500 mg New Bag 09/21/22 1630                    Assessment:  Date/Time Current Dose Concentration Timing of Concentration (h) AUC   9/22 500mg IV q 12 hrs 12.2 ug/ml 06:19 212   Note: Serum concentrations collected for AUC dosing may appear elevated if collected in close proximity to the dose administered, this is not necessarily an indication of toxicity    Plan:  Current dosing regimen is sub-therapeutic  Increase dose to Vancomycin 1000mg IV q 12 hours  Repeat vancomycin concentration ordered for 9/23 @ 1700   Pharmacy will continue to monitor patient and adjust therapy as indicated    Thank you for the consult,  ARNOL Kearney [facility]  9/22/2022 7:18 AM

## 2022-09-22 NOTE — DISCHARGE SUMMARY
[facility]  Office: [address], DO, Pennie Pino, DO, Rishabh Ish, DO, Priya Jessica Montes, DO, Verna Elizabeth MD, Scott Fuller MD, Corinna Rodriguez MD, Angelo Bowman MD,  Mercedes Newman MD, Fatimah Bryant MD, Mihaela Vela DO, Fidelina Parsons MD,  Dave Kim MD, Meena Macias MD, Gurwinder Amos DO, Javy Tam MD, Aubrie Parker MD, Yadi Pineda MD, Howard Frost MD, Orlando Ann MD, Carlos Peralta MD, Brittney Gregory DO, Hemalatha Yousif MD, Rosy Schirmer, MD, Aby Hidalgo, CNP,  Oscar Chavez, CNP, Mulu Douglas, CNP, Jan Dominguez, CNP,  Cam Judd, [facility], Lo Brown, CNP, Amaris Darling, CNP, Destiny Craig, CNP, Duncan Bourgeois, CNP, Jessica Ritter, CNP, Clinton Bartlett PA-C, Ladarius Fleming, CNS, Conchita Chilel, [facility], Ronald Parker, CNP, Malathi Rucker, [facility], Jayesh Cristobal, [facility]    Discharge Summary     Patient ID: Ilya Lord  :  1980   MRN: [id number]     ACCOUNT:  [de-identified]   Patient's PCP: Mia Aiken DO  Admit Date: 2022   Discharge Date: 2022     Length of Stay: 1  Code Status:  Full Code  Admitting Physician: Mihaela Vela DO  Discharge Physician: Mihaela Vela DO     Active Discharge Diagnoses:     Hospital Problem Lists:  Principal Problem:    Septicemia [facility])  Active Problems:    Closed displaced comminuted fracture of shaft of left tibia with malunion    Hypotension    Hypoglycemia    History of substance abuse (HCC)    Mild intermittent asthma without complication    PROMISE (acute kidney injury) ([facility] [place] [address]    Pneumonia of both lower lobes  Resolved Problems:    * No resolved hospital problems.  *      Admission Condition:  good     Discharged Condition: good    Hospital Stay:     Hospital Course:  Ilya Lord is a 38 y.o. male who was admitted for the management of  Septicemia [facility]) , presented to ER with Drug Overdose    43-year-old male presented to the emergency department after feeling found by his partner at home unresponsive. Patient was seen by EMS and given Narcan with a positive response. Patient was brought to the emergency department complaining of left leg pain and swelling. Patient follows at [facility] with Dr. Nicholas Lewis. Patient was started on broad-spectrum antibiotics initially for hypotension. Patient was also ordered Levophed temporarily and taken to ICU however never required vasopressor support. Patient upon arrival in the ICU refused to have evaluation by orthopedic team at [facility].  Patient has a complicated history of a tibial fracture with a medullary nail repair followed last week by his orthopedic surgeon at [facility].  Patient was discussed with hospitalist at [facility] and he was excepted and eventually transferred.      Of note patient was positive for amphetamines, fentanyl and oxycodone upon tox screen on arrival    Significant therapeutic interventions: none    Significant Diagnostic Studies:   Labs / Micro:  CBC:   Lab Results   Component Value Date/Time    WBC 14.6 09/22/2022 06:19 AM    RBC 2.88 09/22/2022 06:19 AM    RBC 4.94 01/09/2012 12:07 PM    HGB 8.7 09/22/2022 06:19 AM    HCT 27.1 09/22/2022 06:19 AM    MCV 94.1 09/22/2022 06:19 AM    MCH 30.2 09/22/2022 06:19 AM    MCHC 32.1 09/22/2022 06:19 AM    RDW 14.3 09/22/2022 06:19 AM     09/22/2022 06:19 AM     01/09/2012 12:07 PM     BMP:    Lab Results   Component Value Date/Time    GLUCOSE 128 09/22/2022 06:19 AM    GLUCOSE 106 01/09/2012 12:07 PM     09/22/2022 06:19 AM    K 4.2 09/22/2022 06:19 AM     09/22/2022 06:19 AM    CO2 29 09/22/2022 06:19 AM    ANIONGAP 5 09/22/2022 06:19 AM    BUN 7 09/22/2022 06:19 AM    CREATININE 0.74 09/22/2022 06:19 AM    BUNCRER 9 09/22/2022 06:19 AM    CALCIUM 8.9 09/22/2022 06:19 AM    LABGLOM >60 09/22/2022 06:19 AM    GFRAA >60 09/22/2022 06:19 AM    GFR      09/22/2022 06:19 AM     HFP:    Lab Results   Component Value Date/Time    PROT 5.9 09/22/2022 06:19 AM     CMP:    Lab Results   Component Value Date/Time    GLUCOSE 128 09/22/2022 06:19 AM    GLUCOSE 106 01/09/2012 12:07 PM     09/22/2022 06:19 AM    K 4.2 09/22/2022 06:19 AM     09/22/2022 06:19 AM    CO2 29 09/22/2022 06:19 AM    BUN 7 09/22/2022 06:19 AM    CREATININE 0.74 09/22/2022 06:19 AM    ANIONGAP 5 09/22/2022 06:19 AM    ALKPHOS 70 09/22/2022 06:19 AM    ALT 21 09/22/2022 06:19 AM    AST 18 09/22/2022 06:19 AM    BILITOT 0.1 09/22/2022 06:19 AM    LABALBU 3.3 09/22/2022 06:19 AM    LABALBU 4.8 01/09/2012 12:07 PM    ALBUMIN 1.4 06/02/2021 01:20 PM    LABGLOM >60 09/22/2022 06:19 AM    GFRAA >60 09/22/2022 06:19 AM    GFR      09/22/2022 06:19 AM    PROT 5.9 09/22/2022 06:19 AM    CALCIUM 8.9 09/22/2022 06:19 AM     PT/INR:    Lab Results   Component Value Date/Time    PROTIME 11.9 09/06/2019 04:31 AM    INR 1.1 09/06/2019 04:31 AM     PTT:   Lab Results   Component Value Date/Time    APTT 28.8 09/06/2019 04:31 AM     FLP:    Lab Results   Component Value Date/Time    CHOL 123 11/19/2013 04:25 AM    TRIG 51 11/19/2013 04:25 AM    HDL 44 06/02/2021 01:20 PM     U/A:    Lab Results   Component Value Date/Time    COLORU Yellow 09/21/2022 01:20 PM    TURBIDITY Clear 09/21/2022 01:20 PM    SPECGRAV 1.020 09/21/2022 01:20 PM    HGBUR NEGATIVE 09/21/2022 01:20 PM    PHUR 5.5 09/21/2022 01:20 PM    PROTEINU NEGATIVE 09/21/2022 01:20 PM    GLUCOSEU NEGATIVE 09/21/2022 01:20 PM    GLUCOSEU NEGATIVE 11/28/2011 05:39 PM    KETUA NEGATIVE 09/21/2022 01:20 PM    BILIRUBINUR NEGATIVE 09/21/2022 01:20 PM    BILIRUBINUR NEGATIVE  Verified by ictotest. 11/28/2011 05:39 PM    UROBILINOGEN Normal 09/21/2022 01:20 PM    NITRU NEGATIVE 09/21/2022 01:20 PM    LEUKOCYTESUR NEGATIVE 09/21/2022 01:20 PM     TSH:    Lab Results   Component Value Date/Time    TSH 1.07 06/02/2021 01:20 PM Radiology:  XR TIBIA FIBULA LEFT (2 VIEWS)    Result Date: 9/21/2022  Status post mid tibia/mid distal fibular osteotomies and tibial ORIF; hardware appears intact. Some associated soft tissue swelling noted, as above. No soft tissue gas or radiopaque foreign body. CT CHEST PULMONARY EMBOLISM W CONTRAST    Result Date: 9/21/2022  No evidence of pulmonary embolism. Bibasilar pneumonia, slightly greater on the right. Distended stomach with residual fluid/debris or food. Additional findings, as above. RECOMMENDATIONS: Unavailable     US RETROPERITONEAL COMPLETE    Result Date: 9/21/2022  Nonobstructing right nephrolithiasis with otherwise unremarkable exam.       Consultations:    Consults:     Final Specialist Recommendations/Findings:   IP CONSULT TO HOSPITALIST  IP CONSULT TO CRITICAL CARE  PHARMACY TO DOSE VANCOMYCIN  IP CONSULT TO ORTHOPEDIC SURGERY  IP CONSULT TO INFECTIOUS DISEASES      The patient was seen and examined on day of discharge and this discharge summary is in conjunction with any daily progress note from day of discharge. Discharge plan:     Disposition: To a non-[facility] facility    Physician Follow Up:     No follow-up provider specified.      Requiring Further Evaluation/Follow Up POST HOSPITALIZATION/Incidental Findings:        Diet:      Activity: As tolerated    Instructions to Patient: none    Discharge Medications:      Medication List        CONTINUE taking these medications      Blood Pressure Kit  Check blood pressure daily as directed            STOP taking these medications      Acetaminophen Extra Strength 500 MG tablet  Generic drug: acetaminophen     hydrOXYzine pamoate 25 MG capsule  Commonly known as: VISTARIL            ASK your doctor about these medications      albuterol sulfate  (90 Base) MCG/ACT inhaler  Commonly known as: PROVENTIL;VENTOLIN;PROAIR  Inhale 2 puffs into the lungs every 4 hours as needed for Wheezing     Calcitrate 950 (200 Ca) MG tablet  Generic drug: calcium citrate  Ask about: Which instructions should I use?     gabapentin 800 MG tablet  Commonly known as: NEURONTIN     * lisinopril 20 MG tablet  Commonly known as: PRINIVIL;ZESTRIL  Take 1 tablet by mouth daily     * lisinopril 10 MG tablet  Commonly known as: PRINIVIL;ZESTRIL  take 1 tablet by mouth once daily     Narcan 4 MG/0.1ML Liqd nasal spray  Generic drug: naloxone           * This list has 2 medication(s) that are the same as other medications prescribed for you. Read the directions carefully, and ask your doctor or other care provider to review them with you. No discharge procedures on file. Time Spent on discharge is  38 mins in patient examination, evaluation, counseling as well as medication reconciliation, prescriptions for required medications, discharge plan and follow up. Electronically signed by   Eunice Peterson DO  9/22/2022  7:56 AM      Thank you Dr. Christianna Babinski, DO for the opportunity to be involved in this patient's care.

## 2022-09-22 NOTE — PLAN OF CARE
Problem: Discharge Planning  Goal: Discharge to home or other facility with appropriate resources  9/22/2022 0941 by Bev Guthrie RN  Outcome: Progressing  9/22/2022 0414 by Salome Salgado RN  Outcome: Progressing  Flowsheets (Taken 9/21/2022 1930)  Discharge to home or other facility with appropriate resources:   Identify barriers to discharge with patient and caregiver   Arrange for needed discharge resources and transportation as appropriate     Problem: Safety - Adult  Goal: Free from fall injury  9/22/2022 0941 by Bev Guthrie RN  Outcome: Progressing  9/22/2022 0414 by Salome Salgado RN  Outcome: Progressing     Problem: Skin/Tissue Integrity  Goal: Absence of new skin breakdown  Description: 1. Monitor for areas of redness and/or skin breakdown  2. Assess vascular access sites hourly  3. Every 4-6 hours minimum:  Change oxygen saturation probe site  4. Every 4-6 hours:  If on nasal continuous positive airway pressure, respiratory therapy assess nares and determine need for appliance change or resting period. 9/22/2022 0941 by Bev Guthrie RN  Outcome: Progressing  9/22/2022 0414 by Salome Salgado RN  Outcome: Progressing     Problem: Pain  Goal: Verbalizes/displays adequate comfort level or baseline comfort level  9/22/2022 0941 by Bev Guthrie RN  Outcome: Progressing  9/22/2022 0414 by Salome Salgado RN  Outcome: Progressing   Pt being dc to [facility] per request of pt and Dr Kenney Ledezma for pt to continue care there where he had surgery 9/13/2022. Uses call light appropriately, fall precautions in place will continue to monitor. Continue to monitor skin integrity and IV sites. Pt request to have drsg changed when he gets to [facility] on his left leg. Patient will have progressive improvement with pain scale with interventions.

## 2022-09-23 LAB
EKG ATRIAL RATE: 117 BPM
EKG P AXIS: 71 DEGREES
EKG P-R INTERVAL: 126 MS
EKG Q-T INTERVAL: 352 MS
EKG QRS DURATION: 90 MS
EKG QTC CALCULATION (BAZETT): 491 MS
EKG R AXIS: 66 DEGREES
EKG T AXIS: 69 DEGREES
EKG VENTRICULAR RATE: 117 BPM

## 2023-03-10 NOTE — PROGRESS NOTES
Pt informed of delay in transport to go to [facility]. Azelaic Acid Counseling: Patient counseled that medicine may cause skin irritation and to avoid applying near the eyes.  In the event of skin irritation, the patient was advised to reduce the amount of the drug applied or use it less frequently.   The patient verbalized understanding of the proper use and possible adverse effects of azelaic acid.  All of the patient's questions and concerns were addressed.

## 2023-05-16 ENCOUNTER — HOSPITAL ENCOUNTER (OUTPATIENT)
Age: 43
Setting detail: SPECIMEN
Discharge: HOME | End: 2023-05-16

## 2023-05-16 ENCOUNTER — OFFICE VISIT (OUTPATIENT)
Dept: FAMILY MEDICINE CLINIC | Age: 43
End: 2023-05-16
Payer: COMMERCIAL

## 2023-05-16 VITALS
OXYGEN SATURATION: 99 % | BODY MASS INDEX: 29.24 KG/M2 | WEIGHT: 198 LBS | HEART RATE: 61 BPM | DIASTOLIC BLOOD PRESSURE: 80 MMHG | TEMPERATURE: 98.3 F | SYSTOLIC BLOOD PRESSURE: 140 MMHG

## 2023-05-16 DIAGNOSIS — I10 PRIMARY HYPERTENSION: ICD-10-CM

## 2023-05-16 DIAGNOSIS — R73.01 IFG (IMPAIRED FASTING GLUCOSE): ICD-10-CM

## 2023-05-16 DIAGNOSIS — R51.9 ACUTE INTRACTABLE HEADACHE, UNSPECIFIED HEADACHE TYPE: Primary | ICD-10-CM

## 2023-05-16 LAB
ALBUMIN SERPL-MCNC: 4.3 G/DL (ref 3.5–5.2)
ALBUMIN/GLOB SERPL: 1.3 {RATIO} (ref 1–2.5)
ALP SERPL-CCNC: 98 U/L (ref 40–129)
ALT SERPL-CCNC: 31 U/L (ref 5–41)
ANION GAP SERPL CALCULATED.3IONS-SCNC: 15 MMOL/L (ref 9–17)
AST SERPL-CCNC: 36 U/L
BASOPHILS # BLD: 0.11 K/UL (ref 0–0.2)
BASOPHILS # BLD: 1 % (ref 0–2)
BILIRUB SERPL-MCNC: <0.1 MG/DL (ref 0.3–1.2)
BUN SERPL-MCNC: 13 MG/DL (ref 6–20)
CALCIUM SERPL-MCNC: 9.5 MG/DL (ref 8.6–10.4)
CHLORIDE SERPL-SCNC: 105 MMOL/L (ref 98–107)
CO2 SERPL-SCNC: 22 MMOL/L (ref 20–31)
CREAT SERPL-MCNC: 0.74 MG/DL (ref 0.7–1.2)
EOSINOPHIL # BLD: 0.21 K/UL (ref 0–0.44)
EOSINOPHILS RELATIVE PERCENT: 3 % (ref 1–4)
ERYTHROCYTE [DISTWIDTH] IN BLOOD BY AUTOMATED COUNT: 15.8 % (ref 11.8–14.4)
EST. AVERAGE GLUCOSE BLD GHB EST-MCNC: 114 MG/DL
GFR SERPL CREATININE-BSD FRML MDRD: >60 ML/MIN/1.73M2
GLUCOSE SERPL-MCNC: 85 MG/DL (ref 70–99)
HBA1C MFR BLD: 5.6 % (ref 4–6)
HCT VFR BLD AUTO: 43.7 % (ref 40.7–50.3)
HGB BLD-MCNC: 14.4 G/DL (ref 13–17)
IMM GRANULOCYTES # BLD AUTO: <0.03 K/UL (ref 0–0.3)
IMM GRANULOCYTES NFR BLD: 0 %
LYMPHOCYTES # BLD: 60 % (ref 24–43)
LYMPHOCYTES NFR BLD: 4.7 K/UL (ref 1.1–3.7)
MCH RBC QN AUTO: 30.9 PG (ref 25.2–33.5)
MCHC RBC AUTO-ENTMCNC: 33 G/DL (ref 28.4–34.8)
MCV RBC AUTO: 93.8 FL (ref 82.6–102.9)
MONOCYTES NFR BLD: 0.74 K/UL (ref 0.1–1.2)
MONOCYTES NFR BLD: 9 % (ref 3–12)
NEUTROPHILS NFR BLD: 27 % (ref 36–65)
NEUTS SEG NFR BLD: 2.18 K/UL (ref 1.5–8.1)
NRBC AUTOMATED: 0 PER 100 WBC
PLATELET # BLD AUTO: 342 K/UL (ref 138–453)
PMV BLD AUTO: 10.3 FL (ref 8.1–13.5)
POTASSIUM SERPL-SCNC: 4.7 MMOL/L (ref 3.7–5.3)
PROT SERPL-MCNC: 7.7 G/DL (ref 6.4–8.3)
RBC # BLD AUTO: 4.66 M/UL (ref 4.21–5.77)
RBC # BLD: ABNORMAL 10*6/UL
SODIUM SERPL-SCNC: 142 MMOL/L (ref 135–144)
TSH SERPL-ACNC: 1.31 UIU/ML (ref 0.3–5)
WBC OTHER # BLD: 8 K/UL (ref 3.5–11.3)

## 2023-05-16 PROCEDURE — G8427 DOCREV CUR MEDS BY ELIG CLIN: HCPCS | Performed by: NURSE PRACTITIONER

## 2023-05-16 PROCEDURE — G8419 CALC BMI OUT NRM PARAM NOF/U: HCPCS | Performed by: NURSE PRACTITIONER

## 2023-05-16 PROCEDURE — 99214 OFFICE O/P EST MOD 30 MIN: CPT | Performed by: NURSE PRACTITIONER

## 2023-05-16 PROCEDURE — 3079F DIAST BP 80-89 MM HG: CPT | Performed by: NURSE PRACTITIONER

## 2023-05-16 PROCEDURE — 3077F SYST BP >= 140 MM HG: CPT | Performed by: NURSE PRACTITIONER

## 2023-05-16 PROCEDURE — 4004F PT TOBACCO SCREEN RCVD TLK: CPT | Performed by: NURSE PRACTITIONER

## 2023-05-16 RX ORDER — LISINOPRIL 10 MG/1
10 TABLET ORAL DAILY
Qty: 30 TABLET | Refills: 2 | Status: SHIPPED | OUTPATIENT
Start: 2023-05-16 | End: 2023-08-14

## 2023-05-16 RX ORDER — BUTALBITAL, ASPIRIN, AND CAFFEINE 325; 50; 40 MG/1; MG/1; MG/1
1 CAPSULE ORAL EVERY 6 HOURS PRN
Qty: 40 CAPSULE | Refills: 0 | Status: SHIPPED | OUTPATIENT
Start: 2023-05-16 | End: 2023-05-26

## 2023-05-16 SDOH — ECONOMIC STABILITY: INCOME INSECURITY: HOW HARD IS IT FOR YOU TO PAY FOR THE VERY BASICS LIKE FOOD, HOUSING, MEDICAL CARE, AND HEATING?: NOT HARD AT ALL

## 2023-05-16 SDOH — ECONOMIC STABILITY: FOOD INSECURITY: WITHIN THE PAST 12 MONTHS, THE FOOD YOU BOUGHT JUST DIDN'T LAST AND YOU DIDN'T HAVE MONEY TO GET MORE.: NEVER TRUE

## 2023-05-16 SDOH — ECONOMIC STABILITY: FOOD INSECURITY: WITHIN THE PAST 12 MONTHS, YOU WORRIED THAT YOUR FOOD WOULD RUN OUT BEFORE YOU GOT MONEY TO BUY MORE.: NEVER TRUE

## 2023-05-16 SDOH — ECONOMIC STABILITY: HOUSING INSECURITY
IN THE LAST 12 MONTHS, WAS THERE A TIME WHEN YOU DID NOT HAVE A STEADY PLACE TO SLEEP OR SLEPT IN A SHELTER (INCLUDING NOW)?: NO

## 2023-05-16 ASSESSMENT — PATIENT HEALTH QUESTIONNAIRE - PHQ9
3. TROUBLE FALLING OR STAYING ASLEEP: 0
SUM OF ALL RESPONSES TO PHQ QUESTIONS 1-9: 0
5. POOR APPETITE OR OVEREATING: 0
8. MOVING OR SPEAKING SO SLOWLY THAT OTHER PEOPLE COULD HAVE NOTICED. OR THE OPPOSITE, BEING SO FIGETY OR RESTLESS THAT YOU HAVE BEEN MOVING AROUND A LOT MORE THAN USUAL: 0
9. THOUGHTS THAT YOU WOULD BE BETTER OFF DEAD, OR OF HURTING YOURSELF: 0
SUM OF ALL RESPONSES TO PHQ9 QUESTIONS 1 & 2: 0
2. FEELING DOWN, DEPRESSED OR HOPELESS: 0
SUM OF ALL RESPONSES TO PHQ QUESTIONS 1-9: 0
4. FEELING TIRED OR HAVING LITTLE ENERGY: 0
7. TROUBLE CONCENTRATING ON THINGS, SUCH AS READING THE NEWSPAPER OR WATCHING TELEVISION: 0
SUM OF ALL RESPONSES TO PHQ QUESTIONS 1-9: 0
1. LITTLE INTEREST OR PLEASURE IN DOING THINGS: 0
10. IF YOU CHECKED OFF ANY PROBLEMS, HOW DIFFICULT HAVE THESE PROBLEMS MADE IT FOR YOU TO DO YOUR WORK, TAKE CARE OF THINGS AT HOME, OR GET ALONG WITH OTHER PEOPLE: 0
SUM OF ALL RESPONSES TO PHQ QUESTIONS 1-9: 0
6. FEELING BAD ABOUT YOURSELF - OR THAT YOU ARE A FAILURE OR HAVE LET YOURSELF OR YOUR FAMILY DOWN: 0

## 2023-05-16 NOTE — PROGRESS NOTES
Screen, Urine 09/21/2022 POSITIVE   Final    Comment:       (Positive cutoff 200 ng/mL)                  Cocaine Metabolite, Urine 09/21/2022 NEGATIVE  NEGATIVE Final    Comment:       (Positive cutoff 300 ng/mL)                  Methadone Screen, Urine 09/21/2022 NEGATIVE  NEGATIVE Final    Comment:       (Positive cutoff 300 ng/mL)                  Opiates, Urine 09/21/2022 NEGATIVE  NEGATIVE Final    Comment:       (Positive cutoff 300 ng/mL)                  Phencyclidine, Urine 09/21/2022 NEGATIVE  NEGATIVE Final    Comment:       (Positive cutoff 25 ng/mL)                  Cannabinoid Scrn, Ur 09/21/2022 NEGATIVE  NEGATIVE Final    Comment:       (Positive cutoff 50 ng/mL)                  Oxycodone Screen, Ur 09/21/2022 POSITIVE (A)  NEGATIVE Final    Comment:       (Positive cutoff 100 ng/mL)                  Fentanyl, Ur 09/21/2022 POSITIVE (A)  NEGATIVE Final    Comment:       (Positive cutoff  5 ng/ml)            Test Information 09/21/2022 Assay provides medical screening only. The absence of expected drug(s) and/or metabolite(s) may indicate diluted or adulterated urine, limitations of testing or timing of collection. Final    Comment: Testing for legal purposes should be confirmed by another method. To request confirmation   of test result, please call the lab within 7 days of sample submission. Hepatitis B Surface Ag 09/21/2022 NONREACTIVE  NONREACTIVE Final    Hepatitis C Ab 09/21/2022 NONREACTIVE  NONREACTIVE Final    Comment:       The hepatitis C procedure used in our laboratory is a Chemiluminescent test specific for   three recombinant HCV antigens. A negative anti-HCV result indicates that the antibodies to   hepatitis C virus are not present at this time. Individuals with reactive anti-HCV should be considered infected and infectious until proven   otherwise. Confirmation of all equivocal or reactive results is recommended by ordering   HCV RNA by PCR.       Hep B Core Ab, IgM

## 2023-05-23 ENCOUNTER — HOSPITAL ENCOUNTER (OUTPATIENT)
Dept: CT IMAGING | Age: 43
Discharge: HOME | End: 2023-05-25
Payer: COMMERCIAL

## 2023-05-23 DIAGNOSIS — R51.9 ACUTE INTRACTABLE HEADACHE, UNSPECIFIED HEADACHE TYPE: ICD-10-CM

## 2023-05-23 PROCEDURE — 70450 CT HEAD/BRAIN W/O DYE: CPT

## 2024-05-29 ENCOUNTER — HOSPITAL ENCOUNTER (INPATIENT)
Age: 44
LOS: 1 days | Discharge: LEFT BEFORE DISCHARGE | End: 2024-05-31
Attending: EMERGENCY MEDICINE | Admitting: STUDENT IN AN ORGANIZED HEALTH CARE EDUCATION/TRAINING PROGRAM
Payer: COMMERCIAL

## 2024-05-29 DIAGNOSIS — F19.929 DRUG INTOXICATION WITH COMPLICATION (HCC): Primary | ICD-10-CM

## 2024-05-29 DIAGNOSIS — R56.9 SEIZURE (HCC): ICD-10-CM

## 2024-05-29 DIAGNOSIS — I95.9 HYPOTENSION, UNSPECIFIED HYPOTENSION TYPE: ICD-10-CM

## 2024-05-29 PROCEDURE — 99291 CRITICAL CARE FIRST HOUR: CPT

## 2024-05-29 RX ORDER — 0.9 % SODIUM CHLORIDE 0.9 %
1000 INTRAVENOUS SOLUTION INTRAVENOUS ONCE
Status: COMPLETED | OUTPATIENT
Start: 2024-05-30 | End: 2024-05-30

## 2024-05-29 RX ORDER — PROPOFOL 10 MG/ML
5-50 INJECTION, EMULSION INTRAVENOUS CONTINUOUS
Status: DISCONTINUED | OUTPATIENT
Start: 2024-05-30 | End: 2024-05-30

## 2024-05-30 ENCOUNTER — APPOINTMENT (OUTPATIENT)
Dept: CT IMAGING | Age: 44
End: 2024-05-30
Payer: COMMERCIAL

## 2024-05-30 ENCOUNTER — APPOINTMENT (OUTPATIENT)
Dept: GENERAL RADIOLOGY | Age: 44
End: 2024-05-30
Payer: COMMERCIAL

## 2024-05-30 PROBLEM — R56.9 SEIZURE (HCC): Status: ACTIVE | Noted: 2024-05-30

## 2024-05-30 PROBLEM — T50.911A DRUG OVERDOSE, MULTIPLE DRUGS, ACCIDENTAL OR UNINTENTIONAL, INITIAL ENCOUNTER: Status: ACTIVE | Noted: 2024-05-30

## 2024-05-30 PROBLEM — G93.1 ANOXIC BRAIN INJURY (HCC): Status: ACTIVE | Noted: 2024-05-30

## 2024-05-30 PROBLEM — F19.929 DRUG INTOXICATION WITH COMPLICATION (HCC): Status: ACTIVE | Noted: 2024-05-30

## 2024-05-30 PROBLEM — T43.624A AMPHETAMINE OVERDOSE OF UNDETERMINED INTENT (HCC): Status: ACTIVE | Noted: 2024-05-30

## 2024-05-30 LAB
ALBUMIN SERPL-MCNC: 3.6 G/DL (ref 3.5–5.2)
ALBUMIN SERPL-MCNC: 3.6 G/DL (ref 3.5–5.2)
ALP SERPL-CCNC: 69 U/L (ref 40–129)
ALP SERPL-CCNC: 70 U/L (ref 40–129)
ALT SERPL-CCNC: 13 U/L (ref 5–41)
ALT SERPL-CCNC: 15 U/L (ref 5–41)
AMPHET UR QL SCN: POSITIVE
ANION GAP SERPL CALCULATED.3IONS-SCNC: 15 MMOL/L (ref 9–17)
ANION GAP SERPL CALCULATED.3IONS-SCNC: 17 MMOL/L (ref 9–17)
APAP SERPL-MCNC: <10 UG/ML (ref 10–30)
AST SERPL-CCNC: 14 U/L
AST SERPL-CCNC: 17 U/L
BARBITURATES UR QL SCN: NEGATIVE
BASOPHILS # BLD: 0.06 K/UL (ref 0–0.2)
BASOPHILS # BLD: 0.08 K/UL (ref 0–0.2)
BASOPHILS NFR BLD: 1 % (ref 0–2)
BASOPHILS NFR BLD: 1 % (ref 0–2)
BENZODIAZ UR QL: NEGATIVE
BILIRUB DIRECT SERPL-MCNC: <0.1 MG/DL
BILIRUB INDIRECT SERPL-MCNC: ABNORMAL MG/DL (ref 0–1)
BILIRUB SERPL-MCNC: 0.1 MG/DL (ref 0.3–1.2)
BILIRUB SERPL-MCNC: 0.2 MG/DL (ref 0.3–1.2)
BILIRUB UR QL STRIP: NEGATIVE
BUN SERPL-MCNC: 13 MG/DL (ref 6–20)
BUN SERPL-MCNC: 15 MG/DL (ref 6–20)
BUN/CREAT SERPL: 14 (ref 9–20)
BUN/CREAT SERPL: 19 (ref 9–20)
CA-I BLD-SCNC: 1.18 MMOL/L (ref 1.15–1.33)
CALCIUM SERPL-MCNC: 7.8 MG/DL (ref 8.6–10.4)
CALCIUM SERPL-MCNC: 7.9 MG/DL (ref 8.6–10.4)
CANNABINOIDS UR QL SCN: NEGATIVE
CHLORIDE SERPL-SCNC: 105 MMOL/L (ref 98–107)
CHLORIDE SERPL-SCNC: 109 MMOL/L (ref 98–107)
CK SERPL-CCNC: 154 U/L (ref 39–308)
CK SERPL-CCNC: 184 U/L (ref 39–308)
CLARITY UR: CLEAR
CO2 SERPL-SCNC: 13 MMOL/L (ref 20–31)
CO2 SERPL-SCNC: 16 MMOL/L (ref 20–31)
COCAINE UR QL SCN: NEGATIVE
COLOR UR: YELLOW
COMMENT: ABNORMAL
CREAT SERPL-MCNC: 0.8 MG/DL (ref 0.7–1.2)
CREAT SERPL-MCNC: 0.9 MG/DL (ref 0.7–1.2)
CRITICAL ACTION: NORMAL
CRITICAL NOTIFICATION DATE/TIME: NORMAL
CRITICAL NOTIFICATION: NORMAL
CRITICAL VALUE READ BACK: YES
EOSINOPHIL # BLD: 0.05 K/UL (ref 0–0.44)
EOSINOPHIL # BLD: 0.11 K/UL (ref 0–0.44)
EOSINOPHILS RELATIVE PERCENT: 0 % (ref 1–4)
EOSINOPHILS RELATIVE PERCENT: 1 % (ref 1–4)
ERYTHROCYTE [DISTWIDTH] IN BLOOD BY AUTOMATED COUNT: 13.9 % (ref 11.8–14.4)
ERYTHROCYTE [DISTWIDTH] IN BLOOD BY AUTOMATED COUNT: 14 % (ref 11.8–14.4)
ETHANOL PERCENT: <0.01 %
ETHANOLAMINE SERPL-MCNC: <10 MG/DL (ref 0–0.08)
FENTANYL UR QL: NEGATIVE
FIO2: 40
GFR, ESTIMATED: >90 ML/MIN/1.73M2
GFR, ESTIMATED: >90 ML/MIN/1.73M2
GLUCOSE SERPL-MCNC: 115 MG/DL (ref 70–99)
GLUCOSE SERPL-MCNC: 97 MG/DL (ref 70–99)
GLUCOSE UR STRIP-MCNC: NEGATIVE MG/DL
HCT VFR BLD AUTO: 39.2 % (ref 40.7–50.3)
HCT VFR BLD AUTO: 40.1 % (ref 40.7–50.3)
HGB BLD-MCNC: 12.4 G/DL (ref 13–17)
HGB BLD-MCNC: 12.9 G/DL (ref 13–17)
HGB UR QL STRIP.AUTO: NEGATIVE
IMM GRANULOCYTES # BLD AUTO: 0.02 K/UL (ref 0–0.3)
IMM GRANULOCYTES # BLD AUTO: 0.09 K/UL (ref 0–0.3)
IMM GRANULOCYTES NFR BLD: 0 %
IMM GRANULOCYTES NFR BLD: 1 %
KETONES UR STRIP-MCNC: ABNORMAL MG/DL
LACTATE BLDV-SCNC: 0.5 MMOL/L (ref 0.5–1.9)
LACTATE BLDV-SCNC: 4.3 MMOL/L (ref 0.5–2.2)
LEUKOCYTE ESTERASE UR QL STRIP: NEGATIVE
LIPASE SERPL-CCNC: 31 U/L (ref 13–60)
LYMPHOCYTES NFR BLD: 2.33 K/UL (ref 1.1–3.7)
LYMPHOCYTES NFR BLD: 3.85 K/UL (ref 1.1–3.7)
LYMPHOCYTES RELATIVE PERCENT: 19 % (ref 24–43)
LYMPHOCYTES RELATIVE PERCENT: 46 % (ref 24–43)
MAGNESIUM SERPL-MCNC: 1.8 MG/DL (ref 1.6–2.6)
MAGNESIUM SERPL-MCNC: 1.8 MG/DL (ref 1.6–2.6)
MCH RBC QN AUTO: 29.7 PG (ref 25.2–33.5)
MCH RBC QN AUTO: 29.7 PG (ref 25.2–33.5)
MCHC RBC AUTO-ENTMCNC: 31.6 G/DL (ref 28.4–34.8)
MCHC RBC AUTO-ENTMCNC: 32.2 G/DL (ref 28.4–34.8)
MCV RBC AUTO: 92.4 FL (ref 82.6–102.9)
MCV RBC AUTO: 94 FL (ref 82.6–102.9)
METHADONE UR QL: NEGATIVE
MODE: NORMAL
MONOCYTES NFR BLD: 0.54 K/UL (ref 0.1–1.2)
MONOCYTES NFR BLD: 0.59 K/UL (ref 0.1–1.2)
MONOCYTES NFR BLD: 5 % (ref 3–12)
MONOCYTES NFR BLD: 7 % (ref 3–12)
MYOGLOBIN SERPL-MCNC: 161 NG/ML (ref 28–72)
NEGATIVE BASE EXCESS, ART: 0.8 MMOL/L (ref 0–2)
NEGATIVE BASE EXCESS, ART: 13.3 MMOL/L (ref 0–2)
NEGATIVE BASE EXCESS, ART: 7.4 MMOL/L (ref 0–2)
NEUTROPHILS NFR BLD: 45 % (ref 36–65)
NEUTROPHILS NFR BLD: 74 % (ref 36–65)
NEUTS SEG NFR BLD: 3.77 K/UL (ref 1.5–8.1)
NEUTS SEG NFR BLD: 8.91 K/UL (ref 1.5–8.1)
NITRITE UR QL STRIP: NEGATIVE
NRBC BLD-RTO: 0 PER 100 WBC
NRBC BLD-RTO: 0 PER 100 WBC
OPIATES UR QL SCN: NEGATIVE
OXYCODONE UR QL SCN: NEGATIVE
PCP UR QL SCN: NEGATIVE
PH UR STRIP: 6 [PH] (ref 5–8)
PHOSPHATE SERPL-MCNC: 2.5 MG/DL (ref 2.5–4.5)
PLATELET # BLD AUTO: 304 K/UL (ref 138–453)
PLATELET # BLD AUTO: 312 K/UL (ref 138–453)
PMV BLD AUTO: 9.2 FL (ref 8.1–13.5)
PMV BLD AUTO: 9.4 FL (ref 8.1–13.5)
POC HCO3: 13.9 MMOL/L (ref 21–28)
POC HCO3: 17.7 MMOL/L (ref 21–28)
POC HCO3: 24.1 MMOL/L (ref 21–28)
POC O2 SATURATION: 98 % (ref 94–98)
POC O2 SATURATION: 98.4 % (ref 94–98)
POC O2 SATURATION: 98.6 % (ref 94–98)
POC PCO2: 34.2 MM HG (ref 35–48)
POC PCO2: 35.7 MM HG (ref 35–48)
POC PCO2: 39.6 MM HG (ref 35–48)
POC PH: 7.2 (ref 7.35–7.45)
POC PH: 7.32 (ref 7.35–7.45)
POC PH: 7.39 (ref 7.35–7.45)
POC PO2: 105.1 MM HG (ref 83–108)
POC PO2: 126.3 MM HG (ref 83–108)
POC PO2: 134.8 MM HG (ref 83–108)
POTASSIUM SERPL-SCNC: 4 MMOL/L (ref 3.7–5.3)
POTASSIUM SERPL-SCNC: 4.4 MMOL/L (ref 3.7–5.3)
PROCALCITONIN SERPL-MCNC: 0.05 NG/ML (ref 0–0.09)
PROT SERPL-MCNC: 6.4 G/DL (ref 6.4–8.3)
PROT SERPL-MCNC: 6.4 G/DL (ref 6.4–8.3)
PROT UR STRIP-MCNC: NEGATIVE MG/DL
RBC # BLD AUTO: 4.17 M/UL (ref 4.21–5.77)
RBC # BLD AUTO: 4.34 M/UL (ref 4.21–5.77)
SALICYLATES SERPL-MCNC: <1 MG/DL (ref 3–10)
SAMPLE SITE: NORMAL
SARS-COV-2 RDRP RESP QL NAA+PROBE: NOT DETECTED
SODIUM SERPL-SCNC: 136 MMOL/L (ref 135–144)
SODIUM SERPL-SCNC: 139 MMOL/L (ref 135–144)
SP GR UR STRIP: 1.04 (ref 1–1.03)
SPECIMEN DESCRIPTION: NORMAL
TEST INFORMATION: ABNORMAL
TROPONIN I SERPL HS-MCNC: 6 NG/L (ref 0–22)
UROBILINOGEN UR STRIP-ACNC: NORMAL EU/DL (ref 0–1)
WBC OTHER # BLD: 12 K/UL (ref 3.5–11.3)
WBC OTHER # BLD: 8.4 K/UL (ref 3.5–11.3)

## 2024-05-30 PROCEDURE — 2580000003 HC RX 258: Performed by: EMERGENCY MEDICINE

## 2024-05-30 PROCEDURE — 6360000002 HC RX W HCPCS: Performed by: PSYCHIATRY & NEUROLOGY

## 2024-05-30 PROCEDURE — 83690 ASSAY OF LIPASE: CPT

## 2024-05-30 PROCEDURE — 80179 DRUG ASSAY SALICYLATE: CPT

## 2024-05-30 PROCEDURE — 2580000003 HC RX 258: Performed by: STUDENT IN AN ORGANIZED HEALTH CARE EDUCATION/TRAINING PROGRAM

## 2024-05-30 PROCEDURE — 99223 1ST HOSP IP/OBS HIGH 75: CPT | Performed by: STUDENT IN AN ORGANIZED HEALTH CARE EDUCATION/TRAINING PROGRAM

## 2024-05-30 PROCEDURE — 80053 COMPREHEN METABOLIC PANEL: CPT

## 2024-05-30 PROCEDURE — 82550 ASSAY OF CK (CPK): CPT

## 2024-05-30 PROCEDURE — 83605 ASSAY OF LACTIC ACID: CPT

## 2024-05-30 PROCEDURE — 2000000000 HC ICU R&B

## 2024-05-30 PROCEDURE — 82330 ASSAY OF CALCIUM: CPT

## 2024-05-30 PROCEDURE — 95816 EEG AWAKE AND DROWSY: CPT

## 2024-05-30 PROCEDURE — 31500 INSERT EMERGENCY AIRWAY: CPT

## 2024-05-30 PROCEDURE — 83874 ASSAY OF MYOGLOBIN: CPT

## 2024-05-30 PROCEDURE — 84484 ASSAY OF TROPONIN QUANT: CPT

## 2024-05-30 PROCEDURE — 93005 ELECTROCARDIOGRAM TRACING: CPT | Performed by: INTERNAL MEDICINE

## 2024-05-30 PROCEDURE — 96360 HYDRATION IV INFUSION INIT: CPT

## 2024-05-30 PROCEDURE — 37799 UNLISTED PX VASCULAR SURGERY: CPT

## 2024-05-30 PROCEDURE — 6360000002 HC RX W HCPCS

## 2024-05-30 PROCEDURE — G0480 DRUG TEST DEF 1-7 CLASSES: HCPCS

## 2024-05-30 PROCEDURE — 82803 BLOOD GASES ANY COMBINATION: CPT

## 2024-05-30 PROCEDURE — 80143 DRUG ASSAY ACETAMINOPHEN: CPT

## 2024-05-30 PROCEDURE — 94002 VENT MGMT INPAT INIT DAY: CPT

## 2024-05-30 PROCEDURE — 70450 CT HEAD/BRAIN W/O DYE: CPT

## 2024-05-30 PROCEDURE — 83735 ASSAY OF MAGNESIUM: CPT

## 2024-05-30 PROCEDURE — 2580000003 HC RX 258: Performed by: INTERNAL MEDICINE

## 2024-05-30 PROCEDURE — 81003 URINALYSIS AUTO W/O SCOPE: CPT

## 2024-05-30 PROCEDURE — 71045 X-RAY EXAM CHEST 1 VIEW: CPT

## 2024-05-30 PROCEDURE — 72125 CT NECK SPINE W/O DYE: CPT

## 2024-05-30 PROCEDURE — 85025 COMPLETE CBC W/AUTO DIFF WBC: CPT

## 2024-05-30 PROCEDURE — 80048 BASIC METABOLIC PNL TOTAL CA: CPT

## 2024-05-30 PROCEDURE — 51702 INSERT TEMP BLADDER CATH: CPT

## 2024-05-30 PROCEDURE — 2500000003 HC RX 250 WO HCPCS: Performed by: INTERNAL MEDICINE

## 2024-05-30 PROCEDURE — 87635 SARS-COV-2 COVID-19 AMP PRB: CPT

## 2024-05-30 PROCEDURE — 36600 WITHDRAWAL OF ARTERIAL BLOOD: CPT

## 2024-05-30 PROCEDURE — 2500000003 HC RX 250 WO HCPCS: Performed by: EMERGENCY MEDICINE

## 2024-05-30 PROCEDURE — 84100 ASSAY OF PHOSPHORUS: CPT

## 2024-05-30 PROCEDURE — 94761 N-INVAS EAR/PLS OXIMETRY MLT: CPT

## 2024-05-30 PROCEDURE — 2500000003 HC RX 250 WO HCPCS

## 2024-05-30 PROCEDURE — 2500000003 HC RX 250 WO HCPCS: Performed by: STUDENT IN AN ORGANIZED HEALTH CARE EDUCATION/TRAINING PROGRAM

## 2024-05-30 PROCEDURE — 6360000002 HC RX W HCPCS: Performed by: NURSE PRACTITIONER

## 2024-05-30 PROCEDURE — 2700000000 HC OXYGEN THERAPY PER DAY

## 2024-05-30 PROCEDURE — 6360000002 HC RX W HCPCS: Performed by: INTERNAL MEDICINE

## 2024-05-30 PROCEDURE — 80076 HEPATIC FUNCTION PANEL: CPT

## 2024-05-30 PROCEDURE — 5A1945Z RESPIRATORY VENTILATION, 24-96 CONSECUTIVE HOURS: ICD-10-PCS | Performed by: EMERGENCY MEDICINE

## 2024-05-30 PROCEDURE — A4216 STERILE WATER/SALINE, 10 ML: HCPCS | Performed by: STUDENT IN AN ORGANIZED HEALTH CARE EDUCATION/TRAINING PROGRAM

## 2024-05-30 PROCEDURE — 6360000002 HC RX W HCPCS: Performed by: STUDENT IN AN ORGANIZED HEALTH CARE EDUCATION/TRAINING PROGRAM

## 2024-05-30 PROCEDURE — 36620 INSERTION CATHETER ARTERY: CPT

## 2024-05-30 PROCEDURE — 84145 PROCALCITONIN (PCT): CPT

## 2024-05-30 PROCEDURE — 80307 DRUG TEST PRSMV CHEM ANLYZR: CPT

## 2024-05-30 PROCEDURE — 0BH17EZ INSERTION OF ENDOTRACHEAL AIRWAY INTO TRACHEA, VIA NATURAL OR ARTIFICIAL OPENING: ICD-10-PCS | Performed by: EMERGENCY MEDICINE

## 2024-05-30 PROCEDURE — 93005 ELECTROCARDIOGRAM TRACING: CPT | Performed by: EMERGENCY MEDICINE

## 2024-05-30 PROCEDURE — 99223 1ST HOSP IP/OBS HIGH 75: CPT | Performed by: PSYCHIATRY & NEUROLOGY

## 2024-05-30 RX ORDER — ACETAMINOPHEN 325 MG/1
650 TABLET ORAL EVERY 6 HOURS PRN
Status: DISCONTINUED | OUTPATIENT
Start: 2024-05-30 | End: 2024-05-31 | Stop reason: HOSPADM

## 2024-05-30 RX ORDER — ALBUTEROL SULFATE 2.5 MG/3ML
2.5 SOLUTION RESPIRATORY (INHALATION)
Status: DISCONTINUED | OUTPATIENT
Start: 2024-05-30 | End: 2024-05-30

## 2024-05-30 RX ORDER — HYDRALAZINE HYDROCHLORIDE 20 MG/ML
5 INJECTION INTRAMUSCULAR; INTRAVENOUS ONCE
Status: COMPLETED | OUTPATIENT
Start: 2024-05-30 | End: 2024-05-30

## 2024-05-30 RX ORDER — HYDRALAZINE HYDROCHLORIDE 20 MG/ML
10 INJECTION INTRAMUSCULAR; INTRAVENOUS EVERY 6 HOURS PRN
Status: DISCONTINUED | OUTPATIENT
Start: 2024-05-30 | End: 2024-05-31 | Stop reason: HOSPADM

## 2024-05-30 RX ORDER — POTASSIUM CHLORIDE 29.8 MG/ML
20 INJECTION INTRAVENOUS PRN
Status: DISCONTINUED | OUTPATIENT
Start: 2024-05-30 | End: 2024-05-31 | Stop reason: HOSPADM

## 2024-05-30 RX ORDER — SODIUM CHLORIDE 0.9 % (FLUSH) 0.9 %
5-40 SYRINGE (ML) INJECTION EVERY 12 HOURS SCHEDULED
Status: DISCONTINUED | OUTPATIENT
Start: 2024-05-30 | End: 2024-05-31 | Stop reason: HOSPADM

## 2024-05-30 RX ORDER — ONDANSETRON 4 MG/1
4 TABLET, ORALLY DISINTEGRATING ORAL EVERY 8 HOURS PRN
Status: DISCONTINUED | OUTPATIENT
Start: 2024-05-30 | End: 2024-05-31 | Stop reason: HOSPADM

## 2024-05-30 RX ORDER — LEVETIRACETAM 500 MG/5ML
1500 INJECTION, SOLUTION, CONCENTRATE INTRAVENOUS ONCE
Status: COMPLETED | OUTPATIENT
Start: 2024-05-30 | End: 2024-05-30

## 2024-05-30 RX ORDER — MAGNESIUM SULFATE IN WATER 40 MG/ML
2000 INJECTION, SOLUTION INTRAVENOUS PRN
Status: DISCONTINUED | OUTPATIENT
Start: 2024-05-30 | End: 2024-05-31 | Stop reason: HOSPADM

## 2024-05-30 RX ORDER — MIDAZOLAM HYDROCHLORIDE 1 MG/ML
2 INJECTION INTRAMUSCULAR; INTRAVENOUS ONCE
Status: COMPLETED | OUTPATIENT
Start: 2024-05-30 | End: 2024-05-30

## 2024-05-30 RX ORDER — LORAZEPAM 2 MG/ML
2 INJECTION INTRAMUSCULAR ONCE
Status: DISCONTINUED | OUTPATIENT
Start: 2024-05-30 | End: 2024-05-31 | Stop reason: HOSPADM

## 2024-05-30 RX ORDER — FENTANYL CITRATE 0.05 MG/ML
50 INJECTION, SOLUTION INTRAMUSCULAR; INTRAVENOUS ONCE
Status: COMPLETED | OUTPATIENT
Start: 2024-05-30 | End: 2024-05-30

## 2024-05-30 RX ORDER — ACETAMINOPHEN 650 MG/1
650 SUPPOSITORY RECTAL EVERY 6 HOURS PRN
Status: DISCONTINUED | OUTPATIENT
Start: 2024-05-30 | End: 2024-05-31 | Stop reason: HOSPADM

## 2024-05-30 RX ORDER — ENOXAPARIN SODIUM 100 MG/ML
40 INJECTION SUBCUTANEOUS DAILY
Status: DISCONTINUED | OUTPATIENT
Start: 2024-05-30 | End: 2024-05-31 | Stop reason: HOSPADM

## 2024-05-30 RX ORDER — LORAZEPAM 2 MG/ML
INJECTION INTRAMUSCULAR
Status: DISCONTINUED
Start: 2024-05-30 | End: 2024-05-30 | Stop reason: WASHOUT

## 2024-05-30 RX ORDER — FENTANYL CITRATE 0.05 MG/ML
25 INJECTION, SOLUTION INTRAMUSCULAR; INTRAVENOUS
Status: DISCONTINUED | OUTPATIENT
Start: 2024-05-30 | End: 2024-05-30

## 2024-05-30 RX ORDER — ONDANSETRON 2 MG/ML
4 INJECTION INTRAMUSCULAR; INTRAVENOUS EVERY 6 HOURS PRN
Status: DISCONTINUED | OUTPATIENT
Start: 2024-05-30 | End: 2024-05-31 | Stop reason: HOSPADM

## 2024-05-30 RX ORDER — POTASSIUM CHLORIDE 7.45 MG/ML
10 INJECTION INTRAVENOUS PRN
Status: DISCONTINUED | OUTPATIENT
Start: 2024-05-30 | End: 2024-05-31 | Stop reason: HOSPADM

## 2024-05-30 RX ORDER — SODIUM CHLORIDE 9 MG/ML
INJECTION, SOLUTION INTRAVENOUS CONTINUOUS
Status: DISCONTINUED | OUTPATIENT
Start: 2024-05-30 | End: 2024-05-30

## 2024-05-30 RX ORDER — 0.9 % SODIUM CHLORIDE 0.9 %
30 INTRAVENOUS SOLUTION INTRAVENOUS ONCE
Status: COMPLETED | OUTPATIENT
Start: 2024-05-30 | End: 2024-05-30

## 2024-05-30 RX ORDER — LEVETIRACETAM 500 MG/5ML
500 INJECTION, SOLUTION, CONCENTRATE INTRAVENOUS EVERY 12 HOURS
Status: DISCONTINUED | OUTPATIENT
Start: 2024-05-30 | End: 2024-05-31 | Stop reason: HOSPADM

## 2024-05-30 RX ORDER — SODIUM CHLORIDE 9 MG/ML
INJECTION, SOLUTION INTRAVENOUS PRN
Status: DISCONTINUED | OUTPATIENT
Start: 2024-05-30 | End: 2024-05-31 | Stop reason: HOSPADM

## 2024-05-30 RX ORDER — SODIUM CHLORIDE 0.9 % (FLUSH) 0.9 %
5-40 SYRINGE (ML) INJECTION PRN
Status: DISCONTINUED | OUTPATIENT
Start: 2024-05-30 | End: 2024-05-31 | Stop reason: HOSPADM

## 2024-05-30 RX ORDER — POLYETHYLENE GLYCOL 3350 17 G/17G
17 POWDER, FOR SOLUTION ORAL DAILY PRN
Status: DISCONTINUED | OUTPATIENT
Start: 2024-05-30 | End: 2024-05-31 | Stop reason: HOSPADM

## 2024-05-30 RX ORDER — NOREPINEPHRINE BITARTRATE 0.06 MG/ML
1-100 INJECTION, SOLUTION INTRAVENOUS CONTINUOUS
Status: DISCONTINUED | OUTPATIENT
Start: 2024-05-30 | End: 2024-05-30

## 2024-05-30 RX ADMIN — Medication 5 MCG/MIN: at 01:32

## 2024-05-30 RX ADMIN — LEVETIRACETAM 1500 MG: 100 INJECTION, SOLUTION INTRAVENOUS at 11:27

## 2024-05-30 RX ADMIN — Medication 4 MG/HR: at 03:04

## 2024-05-30 RX ADMIN — SODIUM CHLORIDE 2196 ML: 9 INJECTION, SOLUTION INTRAVENOUS at 00:35

## 2024-05-30 RX ADMIN — LEVETIRACETAM 500 MG: 100 INJECTION, SOLUTION INTRAVENOUS at 18:43

## 2024-05-30 RX ADMIN — MIDAZOLAM 2 MG: 1 INJECTION INTRAMUSCULAR; INTRAVENOUS at 03:01

## 2024-05-30 RX ADMIN — Medication 10 MG/HR: at 16:54

## 2024-05-30 RX ADMIN — SODIUM CHLORIDE: 9 INJECTION, SOLUTION INTRAVENOUS at 08:09

## 2024-05-30 RX ADMIN — FENTANYL CITRATE 25 MCG: 0.05 INJECTION, SOLUTION INTRAMUSCULAR; INTRAVENOUS at 05:23

## 2024-05-30 RX ADMIN — FAMOTIDINE 20 MG: 10 INJECTION, SOLUTION INTRAVENOUS at 22:06

## 2024-05-30 RX ADMIN — SODIUM BICARBONATE: 84 INJECTION, SOLUTION INTRAVENOUS at 04:34

## 2024-05-30 RX ADMIN — HYDRALAZINE HYDROCHLORIDE 5 MG: 20 INJECTION, SOLUTION INTRAMUSCULAR; INTRAVENOUS at 07:00

## 2024-05-30 RX ADMIN — SODIUM CHLORIDE: 9 INJECTION, SOLUTION INTRAVENOUS at 03:38

## 2024-05-30 RX ADMIN — SODIUM BICARBONATE: 84 INJECTION, SOLUTION INTRAVENOUS at 16:09

## 2024-05-30 RX ADMIN — SODIUM BICARBONATE 50 MEQ: 84 INJECTION INTRAVENOUS at 04:30

## 2024-05-30 RX ADMIN — HYDRALAZINE HYDROCHLORIDE 5 MG: 20 INJECTION, SOLUTION INTRAMUSCULAR; INTRAVENOUS at 07:55

## 2024-05-30 RX ADMIN — FAMOTIDINE 20 MG: 10 INJECTION, SOLUTION INTRAVENOUS at 11:27

## 2024-05-30 RX ADMIN — Medication 50 MCG/HR: at 11:19

## 2024-05-30 RX ADMIN — Medication 125 MCG/HR: at 23:00

## 2024-05-30 RX ADMIN — SODIUM CHLORIDE, PRESERVATIVE FREE 10 ML: 5 INJECTION INTRAVENOUS at 22:07

## 2024-05-30 RX ADMIN — FENTANYL CITRATE 50 MCG: 0.05 INJECTION, SOLUTION INTRAMUSCULAR; INTRAVENOUS at 07:55

## 2024-05-30 RX ADMIN — SODIUM CHLORIDE 1000 ML: 9 INJECTION, SOLUTION INTRAVENOUS at 00:00

## 2024-05-30 RX ADMIN — ENOXAPARIN SODIUM 40 MG: 100 INJECTION SUBCUTANEOUS at 11:27

## 2024-05-30 RX ADMIN — FENTANYL CITRATE 25 MCG: 0.05 INJECTION, SOLUTION INTRAMUSCULAR; INTRAVENOUS at 09:02

## 2024-05-30 RX ADMIN — SODIUM CHLORIDE, PRESERVATIVE FREE 10 ML: 5 INJECTION INTRAVENOUS at 11:28

## 2024-05-30 RX ADMIN — FENTANYL CITRATE 25 MCG: 0.05 INJECTION, SOLUTION INTRAMUSCULAR; INTRAVENOUS at 06:48

## 2024-05-30 ASSESSMENT — PULMONARY FUNCTION TESTS
PIF_VALUE: 21
PIF_VALUE: 22
PIF_VALUE: 23
PIF_VALUE: 17
PIF_VALUE: 19
PIF_VALUE: 19
PIF_VALUE: 17
PIF_VALUE: 12
PIF_VALUE: 12
PIF_VALUE: 28
PIF_VALUE: 22
PIF_VALUE: 24
PIF_VALUE: 22
PIF_VALUE: 23
PIF_VALUE: 12
PIF_VALUE: 19
PIF_VALUE: 12
PIF_VALUE: 16
PIF_VALUE: 12
PIF_VALUE: 12
PIF_VALUE: 26
PIF_VALUE: 24
PIF_VALUE: 19
PIF_VALUE: 18
PIF_VALUE: 16
PIF_VALUE: 23
PIF_VALUE: 15
PIF_VALUE: 25
PIF_VALUE: 23
PIF_VALUE: 22
PIF_VALUE: 23
PIF_VALUE: 16
PIF_VALUE: 22
PIF_VALUE: 12
PIF_VALUE: 22
PIF_VALUE: 17
PIF_VALUE: 16
PIF_VALUE: 16
PIF_VALUE: 24
PIF_VALUE: 23
PIF_VALUE: 19
PIF_VALUE: 23
PIF_VALUE: 16
PIF_VALUE: 29
PIF_VALUE: 16
PIF_VALUE: 18
PIF_VALUE: 23
PIF_VALUE: 9
PIF_VALUE: 16
PIF_VALUE: 24
PIF_VALUE: 23
PIF_VALUE: 17
PIF_VALUE: 22
PIF_VALUE: 20
PIF_VALUE: 19
PIF_VALUE: 23
PIF_VALUE: 23
PIF_VALUE: 18
PIF_VALUE: 29
PIF_VALUE: 12
PIF_VALUE: 12
PIF_VALUE: 17
PIF_VALUE: 27
PIF_VALUE: 17
PIF_VALUE: 22
PIF_VALUE: 29
PIF_VALUE: 18
PIF_VALUE: 29
PIF_VALUE: 21
PIF_VALUE: 24
PIF_VALUE: 19
PIF_VALUE: 12
PIF_VALUE: 18
PIF_VALUE: 28
PIF_VALUE: 19
PIF_VALUE: 23
PIF_VALUE: 17
PIF_VALUE: 13
PIF_VALUE: 23

## 2024-05-30 ASSESSMENT — PAIN SCALES - GENERAL
PAINLEVEL_OUTOF10: 5
PAINLEVEL_OUTOF10: 2
PAINLEVEL_OUTOF10: 5
PAINLEVEL_OUTOF10: 6
PAINLEVEL_OUTOF10: 5
PAINLEVEL_OUTOF10: 6
PAINLEVEL_OUTOF10: 5
PAINLEVEL_OUTOF10: 5
PAINLEVEL_OUTOF10: 2
PAINLEVEL_OUTOF10: 5
PAINLEVEL_OUTOF10: 6
PAINLEVEL_OUTOF10: 5
PAINLEVEL_OUTOF10: 5
PAINLEVEL_OUTOF10: 6
PAINLEVEL_OUTOF10: 5
PAINLEVEL_OUTOF10: 6

## 2024-05-30 NOTE — PROGRESS NOTES
Attempted to place IV under ultrasound guidance.  Unable to veiw any acceptable veins for PIV placement.  Messaged Dr. Resendez with above and order for picc line was obtained

## 2024-05-30 NOTE — CONSULTS
Pulmonary Medicine and Critical Care Consult    Patient - Timmy Holden   MRN -  [id number]   Acct # - [id number]   - 1980      Date of Admission -  2024 11:38 PM  Date of evaluation -  2024  Room - [address] [place] [address]   Hospital Day - 0  Consulting - Reg Resendez MD Primary Care Physician - Mukesh Farr DO     Reason for Consult      ICU management  Assessment/recommendations   Drug overdose with GHB amphetamine fentanyl and oxycodone positive/acute respiratory insufficiency/history of asthma  Continue ventilator support tidal volume 500 respiratory 15 PEEP of 8 FiO2 40%  Sedation with Versed RASS -2  Add fentanyl drip RASS -2  DuoNeb by nebulizer  Drug cessation    Lactic acidosis/metabolic acidosis ?  Related to hypoperfusion  Decrease bicarb drip to 65 an hour follow-up VBG  IV hydration  Repeat lactate check procalcitonin      Seizures  Continue Versed as above  Neurology on consult/EEG/MRI brain  Resume Lamictal    Bradycardian Likely GHB related  monitor heart rate    Hypertension poor control   Hydralazine 10 IV every 4 hours as needed for blood pressure above 180  Will consider resuming home lisinopril    discussed with RN and RT  Peptic ulcer disease prophylax  DVT prophylaxis  Problem List      Patient Active Problem List   Diagnosis    Heavy smoker    History of substance abuse (HCC)    ACL tear right    Mild intermittent asthma without complication    PROMISE (acute kidney injury) ([facility])    Alcohol abuse    Panic anxiety syndrome    Marijuana abuse    Substance abuse, binge pattern (HCC)    IV drug abuse (HCC)    Pneumonia of both lower lobes    Acute hearing loss, bilateral    Major depressive disorder, recurrent episode, severe (HCC)    Vitamin D deficiency    Traumatic brain injury ([facility])    Hypertension    H/O fasciotomy    Gait difficulty    Insomnia    History of lower leg fracture    Benign paroxysmal positional vertigo    Irritable bowel syndrome with diarrhea

## 2024-05-30 NOTE — PROGRESS NOTES
End Of Shift Note  [place] ICU  Summary of shift: Patient has been stable during shift. BP decreased after starting fentanyl drip to control pain. Bicarb was decreased to 65 mL's/hr, NS was d/c'd. Versed is at 10, fentanyl @ 100mcg. Urine output was 550 mL's.     Vitals:    Vitals:    05/30/24 1730 05/30/24 1745 05/30/24 1800 05/30/24 1815   BP:       Pulse: 78 78 76 76   Resp: 12 10 17 (!) 0   Temp:       TempSrc:       SpO2: 100% 100% 100% 100%   Weight:       Height:            I&O:   Intake/Output Summary (Last 24 hours) at 5/30/2024 1857  Last data filed at 5/30/2024 1824  Gross per 24 hour   Intake 1622.92 ml   Output 600 ml   Net 1022.92 ml       Resp Status: Ventilator    Ventilator Settings:  Vent Mode: (S) AC/PRVC Resp Rate (Set): 15 bpm/Vt (Set, mL): 500 mL/ /FiO2 : 40 %    Critical Care IV infusions:   sodium chloride      midazolam 10 mg/hr (05/30/24 1824)    sodium bicarbonate 150 mEq in dextrose 5 % 1,000 mL infusion 65 mL/hr at 05/30/24 1609    [Held by provider] niCARdipine      fentaNYL 100 mcg/hr (05/30/24 1824)        LDA:   PICC 05/30/24 Left Brachial (Active)   Number of days: 0       Peripheral IV 05/29/24 Left External Jugular (Active)   Number of days: 0       Peripheral IV 05/29/24 Right Foot (Active)   Number of days: 0       NG/OG/NJ/NE Tube Orogastric 16 fr Left mouth (Active)   Number of days: 0       Urinary Catheter 05/30/24 Escobar (Active)   Number of days: 0       ETT  (Active)   Number of days: 0       Arterial Line 05/30/24 Right Radial (Active)   Number of days: 0       Incision 09/21/22 Pretibial Left (Active)   Number of days: 617

## 2024-05-30 NOTE — PROGRESS NOTES
Patient had sudden severe seizure and appeared to be posturing during. SBP prior to seizure was in the 160's and post seizure it dropped into the 40's. Levophed started for a couple minutes and stopped once BP improved. Dr. Emerson and Dr. Wheeler updated on patients condition as well as current IV access. Dr. Emerson ordered a versed gtt and bolus  to control seizure activity.

## 2024-05-30 NOTE — PLAN OF CARE
Problem: Respiratory - Adult  Goal: Achieves optimal ventilation and oxygenation  5/30/2024 0725 by Fay Rose RCP  Outcome: Progressing  5/30/2024 0013 by Alexander Polanco RCP  Outcome: Progressing

## 2024-05-30 NOTE — ED PROVIDER NOTES
EMERGENCY DEPARTMENT ENCOUNTER    Pt Name: Timmy Holden  MRN: [id number]  Birthdate 1980  Date of evaluation: 5/30/24  CHIEF COMPLAINT       Chief Complaint   Patient presents with    Unresponsive     HISTORY OF PRESENT ILLNESS   43-year-old presents with complaints of unresponsiveness.  Patient was brought in by EMS, they stated that the patient has a history of substance abuse, he presented to a friend's house and said that he had taken a bunch of GHB and subsequently became unresponsive.  EMS was called.  They tried multiple doses of Narcan without improvement.  Patient arrived here comatose and unresponsive.           REVIEW OF SYSTEMS     Review of Systems   Unable to perform ROS: Mental status change     PASTMEDICAL HISTORY     Past Medical History:   Diagnosis Date    Acute asthma exacerbation 10/10/2013    Acute kidney injury (HCC) 11/18/2013    ADD (attention deficit disorder with hyperactivity)     Alcohol abuse 11/22/2013    Anxiety 11/22/2013    Anxiety and depression     Bipolar disorder ([facility])     Hypertension     borderline    Hypertension 10/21/2020     Past Problem List  Patient Active Problem List   Diagnosis Code    Current every day smoker F17.200    History of substance abuse ([facility]) F19.11    ACL tear right S83.519A    Mild intermittent asthma without complication J45.20    PROMISE (acute kidney injury) ([facility]) N17.9    Alcohol use disorder, mild, in early remission, in controlled environment, abuse F10.11    Panic anxiety syndrome F41.0    Marijuana abuse F12.10    Substance abuse, binge pattern ([facility]) F19.10    IV drug abuse ([facility]) F19.10    Pneumonia of both lower lobes J18.9    Acute hearing loss, bilateral H91.93    Major depressive disorder, recurrent episode, severe ([facility]) F33.2    Vitamin D deficiency E55.9    Traumatic brain injury ([facility]) S06.9XAA    Hypertension I10    H/O fasciotomy Z98.890    Gait difficulty R26.9    Insomnia G47.00    History of lower leg fracture Z87.81    Benign  achieving the goal of therapy     DISCHARGE PRESCRIPTIONS:  New Prescriptions    No medications on file     PHYSICIAN CONSULTS ORDERED THIS ENCOUNTER:  IP CONSULT TO DIETITIAN  IP CONSULT TO CRITICAL CARE  IP CONSULT TO CRITICAL CARE  FINAL IMPRESSION      1. Drug intoxication with complication (HCC)    2. Seizure (HCC)    3. Hypotension, unspecified hypotension type          DISPOSITION/PLAN   DISPOSITION Admitted 05/30/2024 01:21:53 AM      OUTPATIENT FOLLOW UP THE PATIENT:  No follow-up provider specified.    Joey Mars MD        This note was created with the assistance of a speech-recognition program. While intending to generate a document that actually reflects the content of the visit, no guarantees can be provided that every mistake has been identified and corrected by editing.        Joey Mars MD  05/30/24 0223

## 2024-05-30 NOTE — ED NOTES
Fluids infusing rapidly to improve BP. No further orders given at this time. Current BP 88/62 (72).

## 2024-05-30 NOTE — PROGRESS NOTES
Picc placement note:  Dynamic Access RN procedure    Consent signed and obtained by proceduralist, from [facility]. See consent form in paper chart.    Prescribed IV Therapy = sedation, levo, fluids, bi carb  Peripheral ultrasound assessment done. Plan for left brachial vein insertion.   CVR measurement = 10 % (Linear CVR is preferred to be less than 45%).  Product type: Bard 5 fr triple lumen Power PICC.  History/Labs/Allergies Reviewed  Placed By: ZACKERY Ramos RN [facility] - RN (Dynamic Access)  Time out Performed using Two Identifiers  Lot # [id number]  Expiration date = 03-  Trimmed at 40 cm total  External catheter length 0 cm  Number of attempts 1  Special equipment used- Bard 3cg tip confirmation system, ultrasound, and micro-introducer (MST) technique   Catheter securement = adhesive 3M securement device  Dressing applied= Tegaderm CHG  Lidocaine administered intradermally conc.1%, approx 1 ml (Lidocaine Lot# - [id number] and Exp date - 03- )    X RN aware picc placed with ECG technology and is confirmed in the distal 1/3 SVC. Picc is immediately released for use. Rn aware new iv tubing required.         PICC education:     [ X ] Discussed with patient/Family or POA prior to procedure.  Risks and Benefits along with reason for procedure were discussed and teaching was reinforced with an education handout on line  insertion. CDC FAQ Catheter Associated Blood Stream Infections and [facility] [address] REV. 7/13 Nursing and Booklet left at bedside or in chart. Patient (Family or POA) acknowledged understanding of information taught and agreed to procedure.            Upload picture of vessel       Upload picture of ECG tip location documentation

## 2024-05-30 NOTE — PLAN OF CARE
Problem: Respiratory - Adult  Goal: Achieves optimal ventilation and oxygenation  5/30/2024 1951 by Aida Foster RCP  Outcome: Progressing

## 2024-05-30 NOTE — CARE COORDINATION
Case Management Assessment  Initial Evaluation    Date/Time of Evaluation: 5/30/2024 2:19 PM  Assessment Completed by: JOHN MAIN RN    If patient is discharged prior to next notation, then this note serves as note for discharge by case management.    Patient Name: Timmy Holden                   YOB: 1980  Diagnosis: Seizure (HCC) [R56.9]  Drug intoxication with complication (HCC) [F19.929]  Drug overdose, multiple drugs, accidental or unintentional, initial encounter [T50.911A]  Hypotension, unspecified hypotension type [I95.9]                   Date / Time: 5/29/2024 11:38 PM    Patient Admission Status: Inpatient   Readmission Risk (Low < 19, Mod (19-27), High > 27): Readmission Risk Score: 10.4    Current PCP: Mukesh Farr, DO  PCP verified by CM? Yes (unknown)    Chart Reviewed: Yes      History Provided by: Child/Family  Patient Orientation: Unable to Assess (intubated)    Patient Cognition: Other (see comment) (intubated)    Hospitalization in the last 30 days (Readmission):  No    If yes, Readmission Assessment in CM Navigator will be completed.    Advance Directives:      Code Status: Full Code   Patient's Primary Decision Maker is: Legal Next of Kin      Discharge Planning:    Patient lives with: Spouse/Significant Other Type of Home: House  Primary Care Giver: Self  Patient Support Systems include: Spouse/Significant Other, Family Members   Current Financial resources: None  Current community resources: None  Current services prior to admission: Durable Medical Equipment            Current DME: Cane            Type of Home Care services:  None    ADLS  Prior functional level: Independent in ADLs/IADLs  Current functional level: Assistance with the following:, Bathing, Dressing, Toileting, Feeding, Cooking, Housework, Shopping, Mobility    PT AM-PAC:   /24  OT AM-PAC:   /24    Family can provide assistance at DC: No  Would you like Case Management to discuss the  Representative   Patient Representative Name: Brother Jae Moya   [phone]     The Patient and/or Patient Representative Agree with the Discharge Plan? Yes    JOHN MAIN RN  Case Management Department  Ph: [phone] Fax: [phone]

## 2024-05-30 NOTE — PROGRESS NOTES
Dr. Emerson updated on patients bicarb level of 13 on morning ABG. One time amp and continuous bicarb gtt ordered.

## 2024-05-30 NOTE — ED NOTES
Patient arrives unresponsive via EMS. Friend called 911. Patient arrived to [facility] hotel room and stated he took GHB and passed out shortly afterwards. On arrival pt. Is unresponsive and diaphoretic. Sinus david, sinus rhythm on monitor. Shallow breathing. Oxygen saturations 100% on room air. Respirations between 10-15. No gag reflex noted. EJ established left side per EMS. IV in right foot per EMS.

## 2024-05-30 NOTE — PROGRESS NOTES
Made contact with Claude Loya, patients partner.  He will inform patients brother and will have the patients brother call the unit.

## 2024-05-30 NOTE — PLAN OF CARE
Problem: Respiratory - Adult  Goal: Achieves optimal ventilation and oxygenation  Outcome: Progressing        eICU Note:     CXR reviewed.   PICC remains in appropriate location for use.     Bonny Gonzalez DO  [facility], eICU   Pager: [phone]

## 2024-05-30 NOTE — PROGRESS NOTES
Transitions of Care Pharmacy Service   Medication Review    The patient's list of current home medications has been reviewed.     Source(s) of information: Idrisrifrancie, OARRs, Care Everywhere.    Based on information provided by the above source(s), I have updated the patient's home med list as described below.     I changed or updated the following medications on the patient's home medication list:  Removed [DISCONTINUED] butalbital-aspirin-caffeine (FIORINAL) -40 MG capsule, Take 1 capsule by mouth every 6 hours as needed for Headaches for up to 10 days. Max Daily Amount: 4 capsules  [DISCONTINUED] albuterol sulfate  (90 Base) MCG/ACT inhaler, Inhale 2 puffs into the lungs every 4 hours as needed for Wheezing   Other Notes Lisinopril (PRINIVIL;ZESTRIL) 10 MG tablet: Last filled 7/24/23 for 30-day supply.  Lamotrigine 100 mg tablet: documented as patient-reported home medication at [facility] office visit on 6/1/23.       PROVIDER ACTION REQUESTED  Medications that need to be addressed by a physician/nurse practitioner: N/A.    Please feel free to call me with any questions about this encounter. Thank you.    Diana Awan RPH   Transitions of Care Pharmacy Service  Phone:  [phone]    Electronically signed by Diana Awan RPH on 5/30/2024 at 6:35 PM     Medications Prior to Admission:   lisinopril (PRINIVIL;ZESTRIL) 10 MG tablet, Take 1 tablet by mouth daily

## 2024-05-30 NOTE — PROGRESS NOTES
Comprehensive Nutrition Assessment    Type and Reason for Visit:  Consult (tube feed order and management)    Nutrition Recommendations/Plan:   Diet NPO  If starting TF, recommend Osmolite 1.2 goal rate 70mL/hr (1680mL total) providing 2016kcal and 93g protein. Water flushes 30mL every 4 hours.   Monitor TF tolerance, GI function, and labs.      Malnutrition Assessment:  Malnutrition Status:  At risk for malnutrition (Comment) (intubated and sedated) (05/30/24 1402)        Nutrition Assessment:    Patient admitted for drug overdose. Pt. hx of biopolar depression, anxiety, and previous drug overdose. Patient was intubated and sedated last night. Patient began having seizures after intubation. Patient required very little sedation, not on propofol. Patient on Versed and getting brain MRI today. Patient having blood/brown drainage from orogastric tube. Consult for TF order and management. Recommend Osmolite 1.2 goal rate 70mL/hr (1680mL total) providing 2016kcal and 93g protein. Water flushes 30mL every 4 hours. Monitor TF tolerance, GI function, and labs.    Nutrition Related Findings:              Current Nutrition Intake & Therapies:    Average Meal Intake: NPO  Average Supplements Intake: NPO  Diet NPO    Anthropometric Measures:  Height: 177.8 cm (5' 10\")  Ideal Body Weight (IBW): 166 lbs (75 kg)    Admission Body Weight: 86.2 kg (190 lb)  Current Body Weight: 86.2 kg (190 lb), 114.5 % IBW. Weight Source: Other (Comment) (estimated)  Current BMI (kg/m2): 27.3        Weight Adjustment For: No Adjustment     BMI Categories: Overweight (BMI 25.0-29.9)    Estimated Daily Nutrient Needs:  Energy Requirements Based On: Kcal/kg     Energy (kcal/day): 4593-0339 kcal/day (20-25kcal/kg)  Weight Used for Protein Requirements: Ideal  Protein (g/day):  g/day (1.3-1.2g/kg)  Method Used for Fluid Requirements: 1 ml/kcal  Fluid (ml/day): 2014-4972 ml/day (1ml/kcal)    Nutrition Diagnosis:   Inadequate oral intake related to

## 2024-05-30 NOTE — PLAN OF CARE
Problem: Respiratory - Adult  Goal: Achieves optimal ventilation and oxygenation  5/30/2024 1250 by Nina Mendieta RN  Outcome: Progressing  5/30/2024 0725 by Fay Rose RCP  Outcome: Progressing  5/30/2024 0013 by Alexander Polanco RCP  Outcome: Progressing     Problem: Discharge Planning  Goal: Discharge to home or other facility with appropriate resources  Outcome: Progressing  Flowsheets (Taken 5/30/2024 0800)  Discharge to home or other facility with appropriate resources: Identify barriers to discharge with patient and caregiver     Problem: Safety - Medical Restraint  Goal: Remains free of injury from restraints (Restraint for Interference with Medical Device)  Description: INTERVENTIONS:  1. Determine that other, less restrictive measures have been tried or would not be effective before applying the restraint  2. Evaluate the patient's condition at the time of restraint application  3. Inform patient/family regarding the reason for restraint  4. Q2H: Monitor safety, psychosocial status, comfort, nutrition and hydration  Outcome: Progressing  Flowsheets  Taken 5/30/2024 1000 by Nina Mendieta RN  Remains free of injury from restraints (restraint for interference with medical device):   Determine that other, less restrictive measures have been tried or would not be effective before applying the restraint   Evaluate the patient's condition at the time of restraint application   Inform patient/family regarding the reason for restraint   Every 2 hours: Monitor safety, psychosocial status, comfort, nutrition and hydration  Taken 5/30/2024 0800 by Nina Mendieta RN  Remains free of injury from restraints (restraint for interference with medical device):   Determine that other, less restrictive measures have been tried or would not be effective before applying the restraint   Evaluate the patient's condition at the time of restraint application   Inform patient/family regarding the reason for

## 2024-05-30 NOTE — PROGRESS NOTES
Insert Arterial Line  Date/Time:  05/30/24, 2:26 AM  Performed by: Alexander Polanco RCP    Patient identity confirmed: arm band and provided demographic data   Time out: Immediately prior to procedure a \"time out\" was called to verify the correct patient, procedure, equipment, support staff.    Preparation: Patient was prepped and draped in the usual sterile fashion.    Location:right radial    Marky's test normal: yes  Needle gauge: 20     Number of attempts: 1  Post-procedure: transparent dressing applied and line secured    Patient tolerance: well

## 2024-05-30 NOTE — CONSULTS
Data:    Lab Results:     Lab Results   Component Value Date    CHOL 123 11/19/2013    HDL 44 06/02/2021    TRIG 51 11/19/2013    ALT 15 05/30/2024    AST 17 05/30/2024    TSH 1.31 05/16/2023    INR 1.1 09/06/2019    LABA1C 5.6 05/16/2023     Hematology:  Recent Labs     05/30/24  0025 05/30/24  0400   WBC 8.4 12.0*   HGB 12.4* 12.9*   HCT 39.2* 40.1*    304     Chemistry:  Recent Labs     05/30/24  0025 05/30/24  0400    139   K 4.4 4.0    109*   CO2 16* 13*   GLUCOSE 97 115*   BUN 15 13   CREATININE 0.8 0.9   MG 1.8 1.8   CALCIUM 7.9* 7.8*     Recent Labs     05/30/24  0025 05/30/24  0400   AST 14 17   ALT 13 15   CKTOTAL 154 184       No results found for: \"PHENYTOIN\", \"PHENOBARB\", \"VALPROATE\", \"CBMZ\"        Results for orders placed during the hospital encounter of 09/05/19    MRI BRAIN W WO CONTRAST    Narrative  EXAMINATION:  MRI OF THE BRAIN WITHOUT AND WITH CONTRAST  9/8/2019 11:35 am    TECHNIQUE:  Multiplanar multisequence MRI of the head/brain was performed without and  with the administration of intravenous contrast.    COMPARISON:  None.    HISTORY:  ORDERING SYSTEM PROVIDED HISTORY: acute bilateral hearing loss    FINDINGS:  The examination is motion degraded.    INTRACRANIAL STRUCTURES/VENTRICLES:  There is no acute infarct. No mass  effect or midline shift. No evidence of an acute intracranial hemorrhage.  The ventricles and sulci are normal in size and configuration.  The  sellar/suprasellar regions appear unremarkable.  The normal signal voids  within the major intracranial vessels appear maintained.  No abnormal focus  of enhancement is seen within the brain.    ORBITS: The visualized portion of the orbits demonstrate no acute abnormality.    SINUSES: The visualized paranasal sinuses and mastoid air cells are well  aerated.    BONES/SOFT TISSUES: Trace right mastoid effusion.    Impression  No acute intracranial abnormality.    Trace right mastoid effusion    No  brainstem responses  Hx of substance abuse  Hx of Lt Tibia fracture (2020)  History of ADD, bipolar disorder  Care plan is discussed with the patient's significant other and patient's nurse at bedside.   Will follow with you. Thank you for consultation.         This note was partially created using voice recognition software and is inherently subject to errors including those of syntax and \"sound alike\" substitutions which may escape proofreading.  In such instances, original meaning may be extrapolated by contextual derivation.  Jonathan Wu MD 5/30/2024 9:39 AM

## 2024-05-30 NOTE — ED PROVIDER NOTES
Restraints: Ordered at 2358 on 5/28/2024  Restraint type: Siderail:  2  Reason for restraints: Pulling out devices:  Removal of equipment  Duration: 24 hours    The provider must conduct a face to face assessment at the time restraints are ordered and document the following:  The patient's immediate situation: Acute respiratory failure  The patient's reaction to the intervention: Tolerated well  The patient's medical and behavioral condition: Acute respiratory failure  The need to continue or terminate the restraint or seclusion: Continued    The one hour face-to-face evaluation  must be conducted even if the restraints or seclusion are discontinued in less than one hour.    Each order for restraint or seclusion for the management of violent or self-destructive behavior that jeopardizes the immediate physical safety of the patient, a staff member or others may only be renewed:  Every 4 hours for adults 18 years and older.  Every 2 hours for preteens and adolescents ages 9-17 years (ED for hospitals without Pediatrics)  Every 1 hour for children under 9 years of age (ED for hospitals without Pediatrics)      Restraints must be removed when an alternative is available and effective and/or patient no longer meets criteria.        Evelina Rosales MD  05/30/24 4722

## 2024-05-30 NOTE — H&P
MCV 94.0 82.6 - 102.9 fL    MCH 29.7 25.2 - 33.5 pg    MCHC 31.6 28.4 - 34.8 g/dL    RDW 14.0 11.8 - 14.4 %    Platelets 312 138 - 453 k/uL    MPV 9.4 8.1 - 13.5 fL    NRBC Automated 0.0 0.0 per 100 WBC    Neutrophils % 45 36 - 65 %    Lymphocytes % 46 (H) 24 - 43 %    Monocytes % 7 3 - 12 %    Eosinophils % 1 1 - 4 %    Basophils % 1 0 - 2 %    Immature Granulocytes % 0 0 %    Neutrophils Absolute 3.77 1.50 - 8.10 k/uL    Lymphocytes Absolute 3.85 (H) 1.10 - 3.70 k/uL    Monocytes Absolute 0.59 0.10 - 1.20 k/uL    Eosinophils Absolute 0.11 0.00 - 0.44 k/uL    Basophils Absolute 0.08 0.00 - 0.20 k/uL    Immature Granulocytes Absolute 0.02 0.00 - 0.30 k/uL   TOX SCR, BLD, ED    Collection Time: 05/30/24 12:25 AM   Result Value Ref Range    Acetaminophen Level <10 (L) 10 - 30 ug/mL    Ethanol Lvl <10 <10 mg/dL    Ethanol percent <0.010 <0.010 %    Salicylate Lvl <1.0 (L) 3 - 10 mg/dL   Hepatic Function Panel    Collection Time: 05/30/24 12:25 AM   Result Value Ref Range    Albumin 3.6 3.5 - 5.2 g/dL    Alkaline Phosphatase 69 40 - 129 U/L    ALT 13 5 - 41 U/L    AST 14 <40 U/L    Total Bilirubin 0.2 (L) 0.3 - 1.2 mg/dL    Bilirubin, Direct <0.1 <0.3 mg/dL    Bilirubin, Indirect Can not be calculated 0.0 - 1.0 mg/dL    Total Protein 6.4 6.4 - 8.3 g/dL   Magnesium    Collection Time: 05/30/24 12:25 AM   Result Value Ref Range    Magnesium 1.8 1.6 - 2.6 mg/dL   CK    Collection Time: 05/30/24 12:25 AM   Result Value Ref Range    Total  39 - 308 U/L   Myoglobin, Blood    Collection Time: 05/30/24 12:25 AM   Result Value Ref Range    Myoglobin 161 (H) 28 - 72 ng/mL       Imaging/Diagnostics:  CT CERVICAL SPINE WO CONTRAST    Result Date: 5/30/2024  No evidence of acute traumatic injury of the cervical spine. Multilevel degenerative spondylosis.     CT HEAD WO CONTRAST    Result Date: 5/30/2024  No acute intracranial abnormality.     XR CHEST PORTABLE    Result Date: 5/30/2024  No acute abnormality. Endotracheal tube  tip 4.2 cm above the lisa. Enteric tube in place with the tip overlying the gastric cardia.       Assessment :      Hospital Problems             Last Modified POA    * (Principal) Drug overdose, multiple drugs, accidental or unintentional, initial encounter 5/30/2024 Yes       Plan:     Patient status inpatient in the Med/Surge    Drug overdose-continue fluids.  Sedated and intubated.  Monitor labs daily.  Per ER note, patient overdose on GHB.  Head CT negative  Normocytic anemia-transfuse under 7.  No signs of active bleeding  Positive amphetamine on drug screen  Ventilator dependent-due to airway protection.    Consultations:   IP CONSULT TO DIETITIAN  IP CONSULT TO CRITICAL CARE     Patient is admitted as inpatient status because of co-morbidities listed above, severity of signs and symptoms as outlined, requirement for current medical therapies and most importantly because of direct risk to patient if care not provided in a hospital setting.  Expected length of stay > 48 hours.    Stan Wheeler MD  5/30/2024  1:36 AM    Copy sent to Mukesh March DO

## 2024-05-30 NOTE — PROGRESS NOTES
On call NP Robina Garcia updated on patients elevated BP in the 200's systolic following morning chest xray. Despite increasing versed gtt and giving PRN fentanyl, BP still remains in the 180's systolic. No new orders received, writer to continue to monitor.

## 2024-05-30 NOTE — PROGRESS NOTES
End Of Shift Note  [facility] [facility]  Summary of shift: Soon after arrival from ED patient had another seizure and appeared to be posturing during, attempting to bite tube and foaming at his mouth, lasting for about a minute. After episode was over, patient BP dropped to 40's systolic, levophed temporarily restarted but soon shut off. Dr. Emerson ordered a versed gtt and PRN fentanyl. Patient completely unresponsive initially  but now moves his feet occasionally, with or without stimulation, still minimal response to pain. Bicarb low on morning ABG (13)- one amp given and gtt started. BP elevated intermittently in the 200's with stimulation, sedation increased, no new orders received from on call NP, systolic BP remains in the 180's. Unable to get additional IV access, patient still has the left EJ and right foot IV in place, both working well. Plan to have ultrasound IV placed this morning.     Vitals:    Vitals:    05/30/24 0340 05/30/24 0400 05/30/24 0523 05/30/24 0600   BP:  (!) 141/96  (!) 157/99   Pulse: (!) 49 (!) 46  51   Resp:  18 15 15   Temp:  98.1 °F (36.7 °C)     TempSrc:  Axillary     SpO2:  100%  100%   Weight:       Height:            I&O:   Intake/Output Summary (Last 24 hours) at 5/30/2024 0626  Last data filed at 5/30/2024 0623  Gross per 24 hour   Intake 359.29 ml   Output 600 ml   Net -240.71 ml       Resp Status: Intubated.     Ventilator Settings:  Vent Mode: AC/PRVC Resp Rate (Set): 15 bpm/Vt (Set, mL): 500 mL/ /FiO2 : 40 %    Critical Care IV infusions:   sodium chloride      sodium chloride 125 mL/hr at 05/30/24 0623    norepinephrine Stopped (05/30/24 0200)    midazolam 6 mg/hr (05/30/24 0623)    sodium bicarbonate 150 mEq in dextrose 5 % 1,000 mL infusion 100 mL/hr at 05/30/24 0434    propofol Stopped (05/30/24 0000)        LDA:   Peripheral IV 05/29/24 Left External Jugular (Active)   Number of days: 0       Peripheral IV 05/29/24 Right Foot (Active)   Number of days: 0       NG/OG/NJ/NE

## 2024-05-30 NOTE — ED NOTES
TIME OUT 2350  Plan to intubate  Dr. Mars at bedside  Respiratory at bedside  10 etomidate IVP  150 Succ IVP  /82 (94)  HR 53  R 12   02 98%  ET tube 7.5  27cm at the lip  Bilateral breath sounds

## 2024-05-31 ENCOUNTER — APPOINTMENT (OUTPATIENT)
Dept: GENERAL RADIOLOGY | Age: 44
End: 2024-05-31
Payer: COMMERCIAL

## 2024-05-31 VITALS
HEIGHT: 70 IN | HEART RATE: 91 BPM | SYSTOLIC BLOOD PRESSURE: 108 MMHG | OXYGEN SATURATION: 94 % | WEIGHT: 186.51 LBS | TEMPERATURE: 99.3 F | RESPIRATION RATE: 18 BRPM | DIASTOLIC BLOOD PRESSURE: 60 MMHG | BODY MASS INDEX: 26.7 KG/M2

## 2024-05-31 LAB
ALLEN TEST: ABNORMAL
ANION GAP SERPL CALCULATED.3IONS-SCNC: 5 MMOL/L (ref 9–17)
BUN SERPL-MCNC: 5 MG/DL (ref 6–20)
BUN/CREAT SERPL: 8 (ref 9–20)
CALCIUM SERPL-MCNC: 7.2 MG/DL (ref 8.6–10.4)
CHLORIDE SERPL-SCNC: 107 MMOL/L (ref 98–107)
CO2 SERPL-SCNC: 29 MMOL/L (ref 20–31)
CREAT SERPL-MCNC: 0.6 MG/DL (ref 0.7–1.2)
FIO2: 30
GFR, ESTIMATED: >90 ML/MIN/1.73M2
GLUCOSE SERPL-MCNC: 98 MG/DL (ref 70–99)
MAGNESIUM SERPL-MCNC: 1.8 MG/DL (ref 1.6–2.6)
MODE: ABNORMAL
O2 DELIVERY DEVICE: ABNORMAL
PHOSPHATE SERPL-MCNC: 2.4 MG/DL (ref 2.5–4.5)
POC HCO3: 29.4 MMOL/L (ref 21–28)
POC O2 SATURATION: 96.7 % (ref 94–98)
POC PCO2: 47.6 MM HG (ref 35–48)
POC PH: 7.4 (ref 7.35–7.45)
POC PO2: 89.1 MM HG (ref 83–108)
POSITIVE BASE EXCESS, ART: 3.8 MMOL/L (ref 0–3)
POTASSIUM SERPL-SCNC: 3.4 MMOL/L (ref 3.7–5.3)
SAMPLE SITE: ABNORMAL
SODIUM SERPL-SCNC: 141 MMOL/L (ref 135–144)

## 2024-05-31 PROCEDURE — 94761 N-INVAS EAR/PLS OXIMETRY MLT: CPT

## 2024-05-31 PROCEDURE — 71045 X-RAY EXAM CHEST 1 VIEW: CPT

## 2024-05-31 PROCEDURE — 82803 BLOOD GASES ANY COMBINATION: CPT

## 2024-05-31 PROCEDURE — 99233 SBSQ HOSP IP/OBS HIGH 50: CPT | Performed by: PSYCHIATRY & NEUROLOGY

## 2024-05-31 PROCEDURE — 2580000003 HC RX 258: Performed by: INTERNAL MEDICINE

## 2024-05-31 PROCEDURE — 99232 SBSQ HOSP IP/OBS MODERATE 35: CPT | Performed by: FAMILY MEDICINE

## 2024-05-31 PROCEDURE — 2580000003 HC RX 258: Performed by: STUDENT IN AN ORGANIZED HEALTH CARE EDUCATION/TRAINING PROGRAM

## 2024-05-31 PROCEDURE — A4216 STERILE WATER/SALINE, 10 ML: HCPCS | Performed by: STUDENT IN AN ORGANIZED HEALTH CARE EDUCATION/TRAINING PROGRAM

## 2024-05-31 PROCEDURE — 6360000002 HC RX W HCPCS: Performed by: PSYCHIATRY & NEUROLOGY

## 2024-05-31 PROCEDURE — 80048 BASIC METABOLIC PNL TOTAL CA: CPT

## 2024-05-31 PROCEDURE — 84100 ASSAY OF PHOSPHORUS: CPT

## 2024-05-31 PROCEDURE — 6360000002 HC RX W HCPCS: Performed by: STUDENT IN AN ORGANIZED HEALTH CARE EDUCATION/TRAINING PROGRAM

## 2024-05-31 PROCEDURE — 95816 EEG AWAKE AND DROWSY: CPT | Performed by: PSYCHIATRY & NEUROLOGY

## 2024-05-31 PROCEDURE — 83735 ASSAY OF MAGNESIUM: CPT

## 2024-05-31 PROCEDURE — 2500000003 HC RX 250 WO HCPCS: Performed by: STUDENT IN AN ORGANIZED HEALTH CARE EDUCATION/TRAINING PROGRAM

## 2024-05-31 PROCEDURE — 2700000000 HC OXYGEN THERAPY PER DAY

## 2024-05-31 PROCEDURE — 6360000002 HC RX W HCPCS: Performed by: INTERNAL MEDICINE

## 2024-05-31 PROCEDURE — 37799 UNLISTED PX VASCULAR SURGERY: CPT

## 2024-05-31 PROCEDURE — 2500000003 HC RX 250 WO HCPCS: Performed by: INTERNAL MEDICINE

## 2024-05-31 PROCEDURE — 94003 VENT MGMT INPAT SUBQ DAY: CPT

## 2024-05-31 RX ORDER — FENTANYL CITRATE 0.05 MG/ML
25 INJECTION, SOLUTION INTRAMUSCULAR; INTRAVENOUS
Status: DISCONTINUED | OUTPATIENT
Start: 2024-05-31 | End: 2024-05-31 | Stop reason: HOSPADM

## 2024-05-31 RX ADMIN — SODIUM CHLORIDE, PRESERVATIVE FREE 10 ML: 5 INJECTION INTRAVENOUS at 08:50

## 2024-05-31 RX ADMIN — Medication 10 MG/HR: at 05:40

## 2024-05-31 RX ADMIN — FAMOTIDINE 20 MG: 10 INJECTION, SOLUTION INTRAVENOUS at 08:50

## 2024-05-31 RX ADMIN — LEVETIRACETAM 500 MG: 100 INJECTION, SOLUTION INTRAVENOUS at 17:42

## 2024-05-31 RX ADMIN — POTASSIUM CHLORIDE 20 MEQ: 29.8 INJECTION, SOLUTION INTRAVENOUS at 06:01

## 2024-05-31 RX ADMIN — Medication 50 MCG/HR: at 10:55

## 2024-05-31 RX ADMIN — HYDRALAZINE HYDROCHLORIDE 10 MG: 20 INJECTION, SOLUTION INTRAMUSCULAR; INTRAVENOUS at 14:53

## 2024-05-31 RX ADMIN — POTASSIUM CHLORIDE 20 MEQ: 29.8 INJECTION, SOLUTION INTRAVENOUS at 07:12

## 2024-05-31 RX ADMIN — LEVETIRACETAM 500 MG: 100 INJECTION, SOLUTION INTRAVENOUS at 06:01

## 2024-05-31 RX ADMIN — DEXMEDETOMIDINE 0.2 MCG/KG/HR: 100 INJECTION, SOLUTION INTRAVENOUS at 14:33

## 2024-05-31 RX ADMIN — ENOXAPARIN SODIUM 40 MG: 100 INJECTION SUBCUTANEOUS at 08:50

## 2024-05-31 ASSESSMENT — PULMONARY FUNCTION TESTS
PIF_VALUE: 12
PIF_VALUE: 20
PIF_VALUE: 12
PIF_VALUE: 20
PIF_VALUE: 20
PIF_VALUE: 12
PIF_VALUE: 21
PIF_VALUE: 12
PIF_VALUE: 22
PIF_VALUE: 21
PIF_VALUE: 21
PIF_VALUE: 12
PIF_VALUE: 12
PIF_VALUE: 21

## 2024-05-31 NOTE — PLAN OF CARE
Problem: Respiratory - Adult  Goal: Achieves optimal ventilation and oxygenation  5/30/2024 2108 by Janelle Marie RN  Outcome: Progressing  5/30/2024 1951 by Aida Foster RCP  Outcome: Progressing  Flowsheets (Taken 5/30/2024 1938)  Achieves optimal ventilation and oxygenation:   Assess for changes in respiratory status   Oxygen supplementation based on oxygen saturation or arterial blood gases   Assess the need for suctioning and aspirate as needed   Respiratory therapy support as indicated  5/30/2024 1250 by Nina Mendieta RN  Outcome: Progressing  5/30/2024 0725 by Fya Rose RCP  Outcome: Progressing     Problem: Discharge Planning  Goal: Discharge to home or other facility with appropriate resources  5/30/2024 2108 by Janelle Marie RN  Outcome: Progressing  5/30/2024 1250 by Nina Mendieta RN  Outcome: Progressing  Flowsheets (Taken 5/30/2024 0800)  Discharge to home or other facility with appropriate resources: Identify barriers to discharge with patient and caregiver     Problem: Safety - Medical Restraint  Goal: Remains free of injury from restraints (Restraint for Interference with Medical Device)  Description: INTERVENTIONS:  1. Determine that other, less restrictive measures have been tried or would not be effective before applying the restraint  2. Evaluate the patient's condition at the time of restraint application  3. Inform patient/family regarding the reason for restraint  4. Q2H: Monitor safety, psychosocial status, comfort, nutrition and hydration  5/30/2024 2108 by Janelle Marie RN  Outcome: Progressing  Flowsheets  Taken 5/30/2024 2000 by Jaenlle Marie RN  Remains free of injury from restraints (restraint for interference with medical device):   Evaluate the patient's condition at the time of restraint application   Determine that other, less restrictive measures have been tried or would not be effective before applying the restraint   Inform patient/family regarding the reason  for restraint   Every 2 hours: Monitor safety, psychosocial status, comfort, nutrition and hydration  Taken 5/30/2024 1800 by Nina Mendieta RN  Remains free of injury from restraints (restraint for interference with medical device):   Determine that other, less restrictive measures have been tried or would not be effective before applying the restraint   Evaluate the patient's condition at the time of restraint application   Inform patient/family regarding the reason for restraint   Every 2 hours: Monitor safety, psychosocial status, comfort, nutrition and hydration  Taken 5/30/2024 1600 by Nina Mendieta RN  Remains free of injury from restraints (restraint for interference with medical device):   Determine that other, less restrictive measures have been tried or would not be effective before applying the restraint   Evaluate the patient's condition at the time of restraint application   Inform patient/family regarding the reason for restraint   Every 2 hours: Monitor safety, psychosocial status, comfort, nutrition and hydration  Taken 5/30/2024 1400 by Nina Mendieta RN  Remains free of injury from restraints (restraint for interference with medical device):   Determine that other, less restrictive measures have been tried or would not be effective before applying the restraint   Evaluate the patient's condition at the time of restraint application   Inform patient/family regarding the reason for restraint   Every 2 hours: Monitor safety, psychosocial status, comfort, nutrition and hydration  5/30/2024 1250 by Nina Mendieta RN  Outcome: Progressing  Flowsheets  Taken 5/30/2024 1200 by Nina Mendieta RN  Remains free of injury from restraints (restraint for interference with medical device):   Determine that other, less restrictive measures have been tried or would not be effective before applying the restraint   Evaluate the patient's condition at the time of restraint application   Inform

## 2024-05-31 NOTE — PROGRESS NOTES
End Of Shift Note  [facility]  Summary of shift: Uneventful shift, patient urine output slight low, on call NP made aware of this, morning BMP ordered. He continues to get easily agitated, minimal titration done with sedation. Does not follow commands but does made intermittent eye contact with staff. No seizure activity assessed.     Vitals:    Vitals:    05/31/24 0200 05/31/24 0332 05/31/24 0355 05/31/24 0400   BP:    119/63   Pulse:  72 71 71   Resp:    17   Temp:    98.8 °F (37.1 °C)   TempSrc:    Axillary   SpO2: 100% 99% 93% 97%   Weight:       Height:            I&O:   Intake/Output Summary (Last 24 hours) at 5/31/2024 0647  Last data filed at 5/31/2024 0605  Gross per 24 hour   Intake 3415.7 ml   Output 290 ml   Net 3125.7 ml       Resp Status: FiO2 decreased to 30%.     Ventilator Settings:  Vent Mode: AC/PRVC Resp Rate (Set): 15 bpm/Vt (Set, mL): 500 mL/ /FiO2 : 30 %    Critical Care IV infusions:   sodium chloride      midazolam 10 mg/hr (05/31/24 0604)    sodium bicarbonate 150 mEq in dextrose 5 % 1,000 mL infusion 65 mL/hr at 05/30/24 1609    [Held by provider] niCARdipine      fentaNYL 100 mcg/hr (05/31/24 0604)        LDA:   PICC 05/30/24 Left Brachial (Active)   Number of days: 0       NG/OG/NJ/NE Tube Orogastric 16 fr Left mouth (Active)   Number of days: 1       Urinary Catheter 05/30/24 Escobar (Active)   Number of days: 1       ETT  (Active)   Number of days: 1       Arterial Line 05/30/24 Right Radial (Active)   Number of days: 1       Incision 09/21/22 Pretibial Left (Active)   Number of days: 618

## 2024-05-31 NOTE — PROCEDURES
PROCEDURE NOTE  Date: 5/31/2024   Name: Timmy Holden  YOB: 1980    Procedures        EEG REPORT       Patient: Timmy Holden Age: 43 y.o.  MRN: [id number]      Referring Provider: No ref. provider found    History: This routine 30 minute scalp EEG was recorded with video- monitoring for a 43 y.o.. male  who presented with encephalopathy. This EEG was performed to evaluate for focal and epileptiform abnormalities.     Timmy Holden   Current Facility-Administered Medications   Medication Dose Route Frequency Provider Last Rate Last Admin    sodium chloride flush 0.9 % injection 5-40 mL  5-40 mL IntraVENous 2 times per day Stan Wheeler MD   10 mL at 05/31/24 0850    sodium chloride flush 0.9 % injection 5-40 mL  5-40 mL IntraVENous PRN Stan Wheeler MD        0.9 % sodium chloride infusion   IntraVENous PRN Stan Wheeler MD        potassium chloride 20 mEq/50 mL IVPB (Central Line)  20 mEq IntraVENous PRN Stan Wheeler MD 50 mL/hr at 05/31/24 0712 20 mEq at 05/31/24 0712    Or    potassium chloride 10 mEq/100 mL IVPB (Peripheral Line)  10 mEq IntraVENous PRN Stan Wheeler MD        magnesium sulfate 2000 mg in 50 mL IVPB premix  2,000 mg IntraVENous PRN Stan Wheeler MD        enoxaparin (LOVENOX) injection 40 mg  40 mg SubCUTAneous Daily Stan Wheeler MD   40 mg at 05/31/24 0850    ondansetron (ZOFRAN-ODT) disintegrating tablet 4 mg  4 mg Oral Q8H PRN Stan Wheeler MD        Or    ondansetron (ZOFRAN) injection 4 mg  4 mg IntraVENous Q6H PRN Stan Wheeler MD        polyethylene glycol (GLYCOLAX) packet 17 g  17 g Oral Daily PRN Stan Wheeler MD        acetaminophen (TYLENOL) tablet 650 mg  650 mg Oral Q6H PRN Stan Wheeler MD        Or    acetaminophen (TYLENOL) suppository 650 mg  650 mg Rectal Q6H PRN Stan Wheeler MD        famotidine (PEPCID) 20 mg in sodium chloride (PF) 0.9 % 10 mL injection  20 mg IntraVENous BID Stan Wheeler MD   20 mg at

## 2024-05-31 NOTE — PROGRESS NOTES
[facility]  Office: [phone]  Ashok Willoughby DO, Jeanmarie Villar DO, Erik Miller DO, Samson Montes DO, Yoav Pfeiffer MD, Roselyn Presley MD, Reg Resendez MD, Jemma Gee MD,  Martin Hirsch MD, Aaron Reyes MD, Juliet Menjivar MD,  Angelina Mckay DO, Donovan Ramires MD, Sebas Hampton MD, Nelson Willoughby DO, Marian Araujo MD,  Timmy Barnett DO, Arabella Perera MD, Daja Martin MD, Princess Alfredo MD, Jelani Henriquez MD,  Aidan Ignacio MD, Jennifer Graf MD, Erica Burroughs MD, Doris Hardin MD, Colt Lewis MD, Eddie Cruz MD, Claude Rivers DO, David Wilson DO, Fish Ayala MD,  Ron Allison MD, Shirley Waterhouse, CNP,  Mirna Zayas CNP, Jamie Walter, CNP,  Lizeth Short, KRISSY, Violetta Nice, CNP, Trini Cabrera, CNP, Robina Garcia CNP, Keesha Chappell CNP, Lela Locke, PA-C, Bebe Palomares PA-C, Lakeisha Birmingham, CNP, Sana Fabian, CNP, Radha Biggs, CNP, Jocelyn Beckford, CNP, Sharri Aguilar CNP, Sindy Calle, CNS, Meagan Shoemaker, CNP, Karin Bejarano CNP, Tracy Schwab, CNP       [facility]      Daily Progress Note     Admit Date: 5/29/2024  Bed/Room No.  1107/1107-01  Admitting Physician : Reg Resendez MD  Code Status :Full Code  Hospital Day:  LOS: 1 day   Chief Complaint:     Chief Complaint   Patient presents with    Unresponsive     Principal Problem:    Drug overdose, multiple drugs, accidental or unintentional, initial encounter  Active Problems:    Heavy smoker    History of substance abuse (HCC)    Alcohol abuse    Panic anxiety syndrome    Marijuana abuse    Major depressive disorder, recurrent episode, severe (HCC)    Amphetamine overdose of undetermined intent (HCC)    Anoxic brain injury (HCC)    Drug intoxication with complication (HCC)    Seizure (HCC)  Resolved Problems:    * No resolved hospital problems. *    Subjective :        Interval History/Significant events :  05/31/24    Patient remains intubated. He is  CONTRAST   Final Result   No evidence of acute traumatic injury of the cervical spine. Multilevel   degenerative spondylosis.         CT HEAD WO CONTRAST   Final Result   No acute intracranial abnormality.         XR CHEST PORTABLE   Final Result   No acute abnormality.      Endotracheal tube tip 4.2 cm above the lisa. Enteric tube in place with the   tip overlying the gastric cardia.         MRI BRAIN WO CONTRAST    (Results Pending)   XR CHEST PORTABLE    (Results Pending)        Clinical Course : unchanged.    Assessment and Plan  :        Drug Overdose -amphetamine - supportive care.   Acute respiratory failure with hypoxia s/p mechanical ventilation . Vent management per Intensivist .   Acute toxic encephalopathy - monitor for seizures. Unknown Down time. Neurology following.   Polysubstance abuse  Uncontrolled hypertension - hydralazine as needed.   Bipolar disorder -  New witnessed seizure like activity secondary to anoxic brain injury from drug overdose - MRI Brain pending. EEG normal     If unable to get weaned off vent , will start tube feed.   Continue to monitor vitals , Intake / output ,  Cell count , HGB , Kidney function, oxygenation  as indicated .   NO family at bedside.   Plan discussed with Staff Valarie CUMMINGS     (This note is created with the assistance of a speech recognition program. While intending to generate a document that actually reflects the content of the visit, the document can still have some errors including those of syntax and sound a like substitutions which may escape proof reading. In such instances, actual meaning can be extrapolated by contextual diversion.)      Reg Resendez MD  5/31/2024

## 2024-05-31 NOTE — PROGRESS NOTES
NEUROLOGY INPATIENT PROGRESS NOTE    5/31/2024         Timmy Holden is a  43 y.o. male admitted on 5/29/2024 with  Seizure (HCC) [R56.9]  Drug intoxication with complication (HCC) [F19.929]  Drug overdose, multiple drugs, accidental or unintentional, initial encounter [T50.911A]  Hypotension, unspecified hypotension type [I95.9]    Reason for consult: \"Found down, anoxic brain injury\"  History is obtained mostly from the medical record and from the caregivers. Chart is reviewed and patient is examined.   Briefly, this is a  43 y.o. male admitted on 5/29/2024 . As per medical record; patient was brought in by EMS with c/o unresponsiveness.  Patient has history of substance abuse, he presented to a friend's house and said that he had taken \"a bunch of GHB\" and subsequently became unresponsive.  EMS was called; tried multiple doses of Narcan without improvement.  Patient arrived to ER comatose and unresponsive.  Vitals on admit to /87, 59/minute, 15/minute.,  98.1 °F.,  CT head on admit with no acute intracranial abnormalities.  CT cervical spine with no evidence of acute traumatic injury of cervical spine.  Multilevel degenerative spondylosis.   Chest x-ray with no acute abnormality.  Caregivers also stated that patient has had episode of generalized tonic-clonic seizure activity at midnight lasting for 2 minutes and stopped on its own without Ativan given.  Patient had another episode of \"brief seizure for 30 seconds\" of similar nature around 2 AM.  Patient's significant other at bedside stated that patient lives with him for the past 2 years and patient has had a history of drug abuse after going through left ankle injury.  Patient also had history of hospitalization in the past for similar reason as per his significant other.  No history of seizures or stroke or any other neurologic problems in the past.  5/31/2024: Chart reviewed and discussed with caregivers.  They stated that patient has not had  11/19/2013    ALT 15 05/30/2024    AST 17 05/30/2024    TSH 1.31 05/16/2023    INR 1.1 09/06/2019    LABA1C 5.6 05/16/2023     Hematology:  Recent Labs     05/30/24  0025 05/30/24  0400   WBC 8.4 12.0*   HGB 12.4* 12.9*   HCT 39.2* 40.1*    304     Chemistry:  Recent Labs     05/30/24  0025 05/30/24  0400 05/31/24  0355    139 141   K 4.4 4.0 3.4*    109* 107   CO2 16* 13* 29   GLUCOSE 97 115* 98   BUN 15 13 5*   CREATININE 0.8 0.9 0.6*   MG 1.8 1.8 1.8   CALCIUM 7.9* 7.8* 7.2*     Recent Labs     05/30/24  0025 05/30/24  0400   AST 14 17   ALT 13 15   CKTOTAL 154 184       No results found for: \"PHENYTOIN\", \"PHENOBARB\", \"VALPROATE\", \"CBMZ\"        Results for orders placed during the hospital encounter of 09/05/19    MRI BRAIN W WO CONTRAST    Narrative  EXAMINATION:  MRI OF THE BRAIN WITHOUT AND WITH CONTRAST  9/8/2019 11:35 am    TECHNIQUE:  Multiplanar multisequence MRI of the head/brain was performed without and  with the administration of intravenous contrast.    COMPARISON:  None.    HISTORY:  ORDERING SYSTEM PROVIDED HISTORY: acute bilateral hearing loss    FINDINGS:  The examination is motion degraded.    INTRACRANIAL STRUCTURES/VENTRICLES:  There is no acute infarct. No mass  effect or midline shift. No evidence of an acute intracranial hemorrhage.  The ventricles and sulci are normal in size and configuration.  The  sellar/suprasellar regions appear unremarkable.  The normal signal voids  within the major intracranial vessels appear maintained.  No abnormal focus  of enhancement is seen within the brain.    ORBITS: The visualized portion of the orbits demonstrate no acute abnormality.    SINUSES: The visualized paranasal sinuses and mastoid air cells are well  aerated.    BONES/SOFT TISSUES: Trace right mastoid effusion.    Impression  No acute intracranial abnormality.    Trace right mastoid effusion    No results found for this or any previous visit.    Results for orders placed during

## 2024-05-31 NOTE — CARE COORDINATION
Discharge planning    Overdose. Still intubated. Plan to CPAP. EEG completed today. Plan for MRI brain today. FIO2 30%. Lives with sig other. Independent PTA.

## 2024-05-31 NOTE — PROGRESS NOTES
Pulmonary Critical Care Progress Note    Patient seen for the follow up of Drug overdose, multiple drugs, accidental or unintentional, initial encounter     Subjective:    He is on the ventilator weaned down to 30% FiO2.  He woke up on sedation followed commands.  Is currently on Versed 10 and just took an off fentanyl drip.  He is not meds at this point.  His  urine output remained stable.  Blood pressure increased on decreasing sedation    Examination:    Vitals: /77   Pulse 93   Temp 96.8 °F (36 °C) (Axillary)   Resp 17   Ht 1.778 m (5' 10\")   Wt 84.6 kg (186 lb 8.2 oz)   SpO2 99%   BMI 26.76 kg/m²   SpO2  Av.6 %  Min: 93 %  Max: 100 %  General appearance: Intubated sedated unresponsive  Neck: No JVD  Lungs: Decreased breath sound no crackles or wheeze  Heart: regular rate and rhythm, S1, S2 normal, no gallop  Abdomen: Soft, non tender, + BS  Extremities: no cyanosis or clubbing. No significant edema    LABs:    CBC:   Recent Labs     24  0025 24  0400   WBC 8.4 12.0*   HGB 12.4* 12.9*   HCT 39.2* 40.1*    304     BMP:   Recent Labs     24  0400 24  0355    141   K 4.0 3.4*   CO2 13* 29   BUN 13 5*   CREATININE 0.9 0.6*   LABGLOM >90 >90   GLUCOSE 115* 98     LIVER PROFILE:  Recent Labs     24  0025 24  0400   AST 14 17   ALT 13 15       Radiology:    Chest x-ray   Reviewed  There is no evidence of acute chest disease.       Impression/recommendations:      Drug overdose with GHB amphetamine fentanyl and oxycodone positive/acute respiratory insufficiency/history of asthma  Continue ventilator support tidal volume 500 respiratory 15 PEEP of 8 FiO2 30%  Sedation with Versed RASS -2  Discontinue fentanyl drip  Fentanyl 25 every hour.  Start Precedex for CPAP trial; will consider extubation  DuoNeb by nebulizer  Drug cessation     Lactic acidosis/metabolic acidosis ?  Related to hypoperfusion  Decrease bicarb drip to 65 an hour follow-up VBG  IV  hydration  Repeat lactate check procalcitonin        Seizures  Continue Versed as above  Neurology on consult/EEG/MRI brain  Off Lamictal and Keppra per neurology     Bradycardian Likely GHB related  monitor heart rate     Hypertension poor control   Hydralazine 10 IV every 4 hours as needed for blood pressure above 180  Will consider resuming home lisinopril     discussed with RN and RT  Peptic ulcer disease prophylaxis  DVT prophylaxis on Lovenox    Mazin Emerson MD, MD, [facility]  Pulmonary Critical Care and Sleep Medicine,  [facility]  Cell: [phone]  Office: [phone]

## 2024-05-31 NOTE — PROGRESS NOTES
Patient threatening to leave AMA. Writer discussed importance of staying in hospital and patient stated \"I dont give a shit\". Patient attempting to pull out his PICC and ART line at this time.

## 2024-05-31 NOTE — PROGRESS NOTES
Patient was extubated at 17  30 and was relaxing on Precedex at 0.6. Patient woke up and stated \"he is leaving and he can leave AMA so don't tell him anything otherwise\" House supervisor (Presley) notified, Dr henderson notified and advised to to family and try to talk patient into staying until medication wears off. Precedex was turned off. Dr. Jenkins notified. Brother Ranjit is aware and will be here in 15 minutes.

## 2024-05-31 NOTE — PROGRESS NOTES
Patients family arrived to unit, refused to take patient home and started to leave unit. Patient then got out of bed and started running after family members. Family and patient yelling back and forth at eachother in the unit, and writer and another RN chased patient to the front doors of the unit. Patient then turned around and attempted to get into RN's face, so RN let go of patient for safety and patient left unit.

## 2024-05-31 NOTE — PLAN OF CARE
Problem: Respiratory - Adult  Goal: Achieves optimal ventilation and oxygenation  Outcome: Progressing  Flowsheets  Taken 5/31/2024 0800 by Aaliyah Espinal RN  Achieves optimal ventilation and oxygenation:   Assess for changes in respiratory status   Assess for changes in mentation and behavior   Oxygen supplementation based on oxygen saturation or arterial blood gases   Position to facilitate oxygenation and minimize respiratory effort  Taken 5/31/2024 0332 by Aida Foster RCP  Achieves optimal ventilation and oxygenation:   Assess for changes in respiratory status   Oxygen supplementation based on oxygen saturation or arterial blood gases   Assess the need for suctioning and aspirate as needed   Respiratory therapy support as indicated  Taken 5/30/2024 2326 by Aida Foster RCP  Achieves optimal ventilation and oxygenation:   Assess for changes in respiratory status   Oxygen supplementation based on oxygen saturation or arterial blood gases   Assess the need for suctioning and aspirate as needed   Respiratory therapy support as indicated     Problem: Safety - Medical Restraint  Goal: Remains free of injury from restraints (Restraint for Interference with Medical Device)  Description: INTERVENTIONS:  1. Determine that other, less restrictive measures have been tried or would not be effective before applying the restraint  2. Evaluate the patient's condition at the time of restraint application  3. Inform patient/family regarding the reason for restraint  4. Q2H: Monitor safety, psychosocial status, comfort, nutrition and hydration  Outcome: Progressing  Flowsheets  Taken 5/31/2024 1200  Remains free of injury from restraints (restraint for interference with medical device): Evaluate the patient's condition at the time of restraint application  Taken 5/31/2024 1000  Remains free of injury from restraints (restraint for interference with medical device): Evaluate the patient's condition at the

## 2024-05-31 NOTE — PROGRESS NOTES
Patients family called and asked if they could come  patient after AMA paperwork signed. Family on their way.

## 2024-06-01 LAB
EKG ATRIAL RATE: 46 BPM
EKG ATRIAL RATE: 56 BPM
EKG P AXIS: 65 DEGREES
EKG P AXIS: 68 DEGREES
EKG P-R INTERVAL: 150 MS
EKG P-R INTERVAL: 156 MS
EKG Q-T INTERVAL: 506 MS
EKG Q-T INTERVAL: 512 MS
EKG QRS DURATION: 100 MS
EKG QRS DURATION: 102 MS
EKG QTC CALCULATION (BAZETT): 448 MS
EKG QTC CALCULATION (BAZETT): 488 MS
EKG R AXIS: 70 DEGREES
EKG R AXIS: 73 DEGREES
EKG T AXIS: 72 DEGREES
EKG T AXIS: 73 DEGREES
EKG VENTRICULAR RATE: 46 BPM
EKG VENTRICULAR RATE: 56 BPM